# Patient Record
Sex: FEMALE | Race: WHITE | NOT HISPANIC OR LATINO | Employment: OTHER | URBAN - NONMETROPOLITAN AREA
[De-identification: names, ages, dates, MRNs, and addresses within clinical notes are randomized per-mention and may not be internally consistent; named-entity substitution may affect disease eponyms.]

---

## 2017-01-03 DIAGNOSIS — M25.519 ACUTE SHOULDER PAIN, UNSPECIFIED LATERALITY: Primary | ICD-10-CM

## 2017-01-04 ENCOUNTER — HOSPITAL ENCOUNTER (OUTPATIENT)
Dept: MRI IMAGING | Facility: HOSPITAL | Age: 47
Discharge: HOME | End: 2017-01-04
Admitting: NURSE PRACTITIONER

## 2017-01-04 ENCOUNTER — HOSPITAL ENCOUNTER (OUTPATIENT)
Dept: GENERAL RADIOLOGY | Facility: HOSPITAL | Age: 47
Discharge: HOME | End: 2017-01-04

## 2017-01-04 DIAGNOSIS — M15.9 PRIMARY OSTEOARTHRITIS INVOLVING MULTIPLE JOINTS: ICD-10-CM

## 2017-01-04 PROCEDURE — 73030 X-RAY EXAM OF SHOULDER: CPT

## 2017-01-04 PROCEDURE — 72141 MRI NECK SPINE W/O DYE: CPT

## 2017-01-04 PROCEDURE — 73030 X-RAY EXAM OF SHOULDER: CPT | Performed by: RADIOLOGY

## 2017-01-04 PROCEDURE — 72141 MRI NECK SPINE W/O DYE: CPT | Performed by: RADIOLOGY

## 2017-01-17 ENCOUNTER — OFFICE VISIT (OUTPATIENT)
Dept: FAMILY MEDICINE CLINIC | Facility: CLINIC | Age: 47
End: 2017-01-17

## 2017-01-17 VITALS
BODY MASS INDEX: 42.34 KG/M2 | TEMPERATURE: 98.7 F | OXYGEN SATURATION: 93 % | SYSTOLIC BLOOD PRESSURE: 120 MMHG | HEART RATE: 85 BPM | DIASTOLIC BLOOD PRESSURE: 70 MMHG | WEIGHT: 248 LBS | HEIGHT: 64 IN

## 2017-01-17 DIAGNOSIS — K57.20 DIVERTICULITIS OF LARGE INTESTINE WITH PERFORATION WITHOUT BLEEDING: ICD-10-CM

## 2017-01-17 DIAGNOSIS — Z72.0 TOBACCO USE: Primary | ICD-10-CM

## 2017-01-17 DIAGNOSIS — E11.51 CONTROLLED TYPE 2 DM WITH PERIPHERAL CIRCULATORY DISORDER (HCC): ICD-10-CM

## 2017-01-17 DIAGNOSIS — M15.9 PRIMARY OSTEOARTHRITIS INVOLVING MULTIPLE JOINTS: ICD-10-CM

## 2017-01-17 DIAGNOSIS — M25.512 LEFT SHOULDER PAIN, UNSPECIFIED CHRONICITY: ICD-10-CM

## 2017-01-17 PROBLEM — I10 HYPERTENSION: Status: ACTIVE | Noted: 2017-01-17

## 2017-01-17 LAB
ALBUMIN SERPL-MCNC: 4.4 G/DL (ref 3.5–5)
ALBUMIN UR-MCNC: 5.2 MG/L
ALBUMIN/GLOB SERPL: 1.5 G/DL (ref 1.5–2.5)
ALP SERPL-CCNC: 64 U/L (ref 46–116)
ALT SERPL W P-5'-P-CCNC: 15 U/L (ref 10–36)
AMYLASE SERPL-CCNC: 33 U/L (ref 28–100)
ANION GAP SERPL CALCULATED.3IONS-SCNC: 0.3 MMOL/L (ref 3.6–11.2)
AST SERPL-CCNC: 12 U/L (ref 10–30)
BASOPHILS # BLD AUTO: 0.05 10*3/MM3 (ref 0–0.3)
BASOPHILS NFR BLD AUTO: 0.5 % (ref 0–2)
BILIRUB SERPL-MCNC: 0.3 MG/DL (ref 0.2–1.8)
BUN BLD-MCNC: 7 MG/DL (ref 7–21)
BUN/CREAT SERPL: 11.1 (ref 7–25)
CALCIUM SPEC-SCNC: 9.9 MG/DL (ref 7.7–10)
CHLORIDE SERPL-SCNC: 110 MMOL/L (ref 99–112)
CO2 SERPL-SCNC: 32.7 MMOL/L (ref 24.3–31.9)
CREAT BLD-MCNC: 0.63 MG/DL (ref 0.43–1.29)
DEPRECATED RDW RBC AUTO: 47.5 FL (ref 37–54)
EOSINOPHIL # BLD AUTO: 0.2 10*3/MM3 (ref 0–0.7)
EOSINOPHIL NFR BLD AUTO: 2.1 % (ref 0–5)
ERYTHROCYTE [DISTWIDTH] IN BLOOD BY AUTOMATED COUNT: 14.9 % (ref 11.5–14.5)
GFR SERPL CREATININE-BSD FRML MDRD: 101 ML/MIN/1.73
GLOBULIN UR ELPH-MCNC: 2.9 GM/DL
GLUCOSE BLD-MCNC: 107 MG/DL (ref 70–110)
HCT VFR BLD AUTO: 42.3 % (ref 37–47)
HGB BLD-MCNC: 13.3 G/DL (ref 12–16)
IMM GRANULOCYTES # BLD: 0.01 10*3/MM3 (ref 0–0.03)
IMM GRANULOCYTES NFR BLD: 0.1 % (ref 0–0.5)
LIPASE SERPL-CCNC: 32 U/L (ref 13–60)
LYMPHOCYTES # BLD AUTO: 2.62 10*3/MM3 (ref 1–3)
LYMPHOCYTES NFR BLD AUTO: 27.7 % (ref 21–51)
MCH RBC QN AUTO: 27.4 PG (ref 27–33)
MCHC RBC AUTO-ENTMCNC: 31.4 G/DL (ref 33–37)
MCV RBC AUTO: 87 FL (ref 80–94)
MONOCYTES # BLD AUTO: 0.8 10*3/MM3 (ref 0.1–0.9)
MONOCYTES NFR BLD AUTO: 8.5 % (ref 0–10)
NEUTROPHILS # BLD AUTO: 5.77 10*3/MM3 (ref 1.4–6.5)
NEUTROPHILS NFR BLD AUTO: 61.1 % (ref 30–70)
OSMOLALITY SERPL CALC.SUM OF ELEC: 283.4 MOSM/KG (ref 273–305)
PLATELET # BLD AUTO: 246 10*3/MM3 (ref 130–400)
PMV BLD AUTO: 10.6 FL (ref 6–10)
POTASSIUM BLD-SCNC: 3.8 MMOL/L (ref 3.5–5.3)
PROT SERPL-MCNC: 7.3 G/DL (ref 6–8)
RBC # BLD AUTO: 4.86 10*6/MM3 (ref 4.2–5.4)
SODIUM BLD-SCNC: 143 MMOL/L (ref 135–153)
WBC NRBC COR # BLD: 9.45 10*3/MM3 (ref 4.5–12.5)

## 2017-01-17 PROCEDURE — 36415 COLL VENOUS BLD VENIPUNCTURE: CPT | Performed by: NURSE PRACTITIONER

## 2017-01-17 PROCEDURE — 83690 ASSAY OF LIPASE: CPT | Performed by: NURSE PRACTITIONER

## 2017-01-17 PROCEDURE — 82043 UR ALBUMIN QUANTITATIVE: CPT | Performed by: NURSE PRACTITIONER

## 2017-01-17 PROCEDURE — 85025 COMPLETE CBC W/AUTO DIFF WBC: CPT | Performed by: NURSE PRACTITIONER

## 2017-01-17 PROCEDURE — 84681 ASSAY OF C-PEPTIDE: CPT | Performed by: NURSE PRACTITIONER

## 2017-01-17 PROCEDURE — 80053 COMPREHEN METABOLIC PANEL: CPT | Performed by: NURSE PRACTITIONER

## 2017-01-17 PROCEDURE — 83036 HEMOGLOBIN GLYCOSYLATED A1C: CPT | Performed by: NURSE PRACTITIONER

## 2017-01-17 PROCEDURE — 82150 ASSAY OF AMYLASE: CPT | Performed by: NURSE PRACTITIONER

## 2017-01-17 PROCEDURE — 99214 OFFICE O/P EST MOD 30 MIN: CPT | Performed by: NURSE PRACTITIONER

## 2017-01-17 RX ORDER — HYDROCODONE BITARTRATE AND ACETAMINOPHEN 10; 325 MG/1; MG/1
1 TABLET ORAL EVERY 6 HOURS PRN
Qty: 90 TABLET | Refills: 0 | Status: SHIPPED | OUTPATIENT
Start: 2017-01-17 | End: 2017-02-14 | Stop reason: SDUPTHER

## 2017-01-17 NOTE — PROGRESS NOTES
Subjective   Mirian Fischer is a 46 y.o. female.     History of Present Illness     Joint pain  Rates her pain as 4 on psr today. At its worse she rates it at 8 on psr. Patient states that pain is reduced by at least one half with current Medication/Treatment. Pain does interfere with ability to perform daily activities as well as enjoyment of life. Patient reports quality of life and mobility has improved as a result of current treatment. Patient describes pain as aching, burning, sharp, stabbing and throbbing. Pain is made worse from going up and down stairs, turning, bending, standing, walking and sitting . Pain is eased by medication, rest, Hydrocodone and Neurontin. [facility] urine drug screen will be obtained as indicated. Pt has failed physical therapy in the past. She has been seen by neurology and ortho. Radiology is on file.      Left shoulder pain   Mirian is reporting  pain and numbness for the past few months in her left arm and hand. The pain is described as sharp in her shoulder , worse with movement, radiates down her arm and into her wrist and hand with numbness in her two middle fingers at times. Xray is on file with no findings. The pain is reported as getting worse.       Diverticulitis  Did not keep her follow up apt with Dr. Pruitt to schedule surgery.     Type 2 diabetes  Pt states that her glucose goes high after she eats but gets low at times making her feel weak. She admits to eating a diet high in carbs and fats. Has checked on lap band and is waiting on an appointment. Is taking metformin. Checks her glucose less often than twice daily.         The following portions of the patient's history were reviewed and updated as appropriate: allergies, current medications, past family history, past medical history, past social history, past surgical history and problem list.    Review of Systems   Constitutional: Negative for activity change, appetite change, chills, fatigue and fever.   HENT:  Negative for ear pain, facial swelling, hearing loss, sinus pressure, sore throat, trouble swallowing and voice change.    Eyes: Negative for pain, discharge and visual disturbance.   Respiratory: Negative for apnea, cough, chest tightness, shortness of breath and wheezing.    Cardiovascular: Negative for chest pain, palpitations and leg swelling.   Gastrointestinal: Positive for abdominal pain (left lower quad at times, not current ). Negative for blood in stool, constipation, diarrhea, nausea and vomiting.   Endocrine: Negative.    Genitourinary: Negative for dysuria.   Musculoskeletal: Positive for arthralgias, back pain and myalgias. Negative for neck stiffness.   Skin: Negative for color change.   Allergic/Immunologic: Positive for environmental allergies. Negative for food allergies and immunocompromised state.   Neurological: Positive for numbness (left hand at times ). Negative for headaches.   Hematological: Negative for adenopathy. Does not bruise/bleed easily.   Psychiatric/Behavioral: Negative for confusion, self-injury and suicidal ideas. The patient is not nervous/anxious.    All other systems reviewed and are negative.      Objective   Physical Exam   Constitutional: She is oriented to person, place, and time. Vital signs are normal. She appears well-developed and well-nourished. No distress.     Obese adult female   HENT:   Head: Normocephalic and atraumatic.   Right Ear: External ear normal.   Left Ear: External ear normal.   Nose: Nose normal.   Mouth/Throat: Oropharynx is clear and moist. No oropharyngeal exudate.   Eyes: Conjunctivae, EOM and lids are normal. Pupils are equal, round, and reactive to light.   Neck: Trachea normal and normal range of motion. Neck supple. No thyromegaly present.   Cardiovascular: Normal rate, regular rhythm, normal heart sounds and intact distal pulses.    No murmur heard.  Pulmonary/Chest: Effort normal and breath sounds normal. No respiratory distress. She has no  wheezes. She has no rales. She exhibits no tenderness.   Abdominal: Soft. Bowel sounds are normal. She exhibits no distension and no mass. There is no tenderness. There is no rebound and no guarding.   Musculoskeletal:        Left shoulder: She exhibits decreased range of motion, tenderness, crepitus, pain and decreased strength. She exhibits normal pulse.        Left wrist: She exhibits tenderness. She exhibits no swelling and no effusion.        Lumbar back: She exhibits decreased range of motion, tenderness, pain and spasm.        Left hand: She exhibits decreased capillary refill. Decreased sensation noted. Decreased strength noted.   Decreased lumbar curvature, there is pain with forward flexion. DTR's +2. No edema.     Patient has several missing tips of fingers ( right hand second and third digits).      Lymphadenopathy:     She has no cervical adenopathy.   Neurological: She is alert and oriented to person, place, and time. She has normal reflexes.   Weak  in the left hand as compared to the right.  Pulses are good.    Skin: Skin is warm and dry. No rash noted. No erythema. No pallor.   Psychiatric: She has a normal mood and affect. Her speech is normal and behavior is normal. Judgment and thought content normal. Her affect is not inappropriate. Cognition and memory are normal.   Denies thoughts of hurting self or others.   Nursing note and vitals reviewed.      Assessment/Plan   Mirian was seen today for osteoarthritis, hypertension, anxiety and back pain.    Diagnoses and all orders for this visit:    Tobacco use  - advised to stop smoking     Primary osteoarthritis involving multiple joints  -     HYDROcodone-acetaminophen (NORCO)  MG per tablet; Take 1 tablet by mouth Every 6 (Six) Hours As Needed for moderate pain (4-6).  -     Cancel: Urine Drug Screen  -     Comprehensive Metabolic Panel  -     CBC & Differential  -     Urine Drug Screen  -     CBC Auto Differential    Diverticulitis of  large intestine with perforation without bleeding  -     Comprehensive Metabolic Panel  -     CBC & Differential  -     CBC Auto Differential    Controlled type 2 DM with peripheral circulatory disorder  -     Amylase  -     Lipase  -     Hemoglobin A1C With EMG  -     C-Peptide  -     MicroAlbumin, Urine, Random; Future  -     MicroAlbumin, Urine, Random    Left shoulder pain, unspecified chronicity  -     MRI Shoulder Left With Contrast; Future  No physical therapy at this time, possible tear.         Recommend MRI of left shoulder due to pain. She is unable to tolerate any physical at this time due to pain and limited use.   Have encouraged Mirian to stop smoking.     I have discussed diagnosis in detail today allowing time for questions and answers. Pt is aware of reasons to seek urgent or emergent medical care as well as reasons to return to the clinic for evaluation. Possible side effects, interactions and progression of symptoms discussed as well. Pt / family states understanding.   Weight loss and body mechanics discussed.  Labs today.   Encouraged pt to make and keep her follow up appointment with Dr. Pruitt to discuss possible surgery for diverticulitis.   Marques Reviewed as consistent. Uds on file as consistent. Repeat uds today then randomly.   Follow up in one month, sooner if neeed.

## 2017-01-17 NOTE — MR AVS SNAPSHOT
Mirian VARSHA DiasClarion   1/17/2017 10:40 AM   Office Visit    Dept Phone:  [phone]   Encounter #:  [id number]    Provider:  CHARMAINE Dejesus   Department:  [facility]                Your Full Care Plan              Where to Get Your Medications      You can get these medications from any pharmacy     Bring a paper prescription for each of these medications     HYDROcodone-acetaminophen  MG per tablet            Your Updated Medication List          This list is accurate as of: 1/17/17 11:20 AM.  Always use your most recent med list.                atenolol 25 MG tablet   Commonly known as:  TENORMIN   TAKE 1 TABLET BY MOUTH TWICE DAILY       diclofenac 75 MG EC tablet   Commonly known as:  VOLTAREN   Take 1 tablet by mouth 2 (Two) Times a Day.       DULoxetine 60 MG capsule   Commonly known as:  CYMBALTA   Take 1 capsule by mouth daily.       esomeprazole 40 MG capsule   Commonly known as:  NEXIUM   Take 1 capsule by mouth Every Morning Before Breakfast.       fluticasone 50 MCG/ACT nasal spray   Commonly known as:  FLONASE   1 spray into each nostril Daily. Administer 1 sprays in each nostril for each dose.       gabapentin 300 MG capsule   Commonly known as:  NEURONTIN   Take 1 capsule by mouth 3 (Three) Times a Day.       hydrochlorothiazide 25 MG tablet   Commonly known as:  HYDRODIURIL   Take 1 tablet by mouth Daily.       HYDROcodone-acetaminophen  MG per tablet   Commonly known as:  NORCO   Take 1 tablet by mouth Every 6 (Six) Hours As Needed for moderate pain (4-6).       levocetirizine 5 MG tablet   Commonly known as:  XYZAL   Take 1 tablet by mouth Every Evening.       linaclotide 290 MCG capsule capsule   Commonly known as:  LINZESS   Take 290 mcg by mouth Every Morning Before Breakfast.       lisinopril 10 MG tablet   Commonly known as:  PRINIVIL,ZESTRIL   Take 1 tablet by mouth Daily.       medroxyPROGESTERone 150  MG/ML injection   Commonly known as:  DEPO-PROVERA   Inject 1 mL into the shoulder, thigh, or buttocks every 3 (three) months.       metFORMIN  MG 24 hr tablet   Commonly known as:  GLUCOPHAGE-XR   Take 1 tablet by mouth Daily.       methocarbamol 750 MG tablet   Commonly known as:  ROBAXIN   Take 1 tablet by mouth 2 (Two) Times a Day.       nizatidine 150 MG capsule   Commonly known as:  AXID   Take 1 capsule by mouth 2 (Two) Times a Day.       potassium chloride 10 MEQ CR tablet   Commonly known as:  K-DUR   Take 1 tablet by mouth 2 (Two) Times a Day.       simvastatin 20 MG tablet   Commonly known as:  ZOCOR   Take 1 tablet by mouth Daily.       varenicline 1 MG tablet   Commonly known as:  CHANTIX   Take 1 tablet by mouth 2 (Two) Times a Day.       vitamin D 13431 UNITS capsule capsule   Commonly known as:  ERGOCALCIFEROL   Take 1 capsule by mouth Every 7 (Seven) Days. wednesday               We Performed the Following     Amylase     C-Peptide     CBC & Differential     CBC Auto Differential     Comprehensive Metabolic Panel     Hemoglobin A1C With EMG     Lipase     MicroAlbumin, Urine, Random     Urine Drug Screen       You Were Diagnosed With        Codes Comments    Tobacco use    -  Primary ICD-10-CM: Z72.0  ICD-9-CM: 305.1     Primary osteoarthritis involving multiple joints     ICD-10-CM: M15.0  ICD-9-CM: 715.09     Diverticulitis of large intestine with perforation without bleeding     ICD-10-CM: K57.20  ICD-9-CM: 562.11, 569.83     Controlled type 2 DM with peripheral circulatory disorder     ICD-10-CM: E11.51  ICD-9-CM: 250.70, 443.81     Left shoulder pain, unspecified chronicity     ICD-10-CM: M25.512  ICD-9-CM: 719.41       Instructions     None    Patient Instructions History      Upcoming Appointments     Visit Type Date Time Department    OFFICE VISIT 1/17/2017 10:40 AM [facility] [place]    OFFICE VISIT 2/14/2017  3:00 PM [facility] [place]      MyChart Signup     [facility] MyChart  "allows you to send messages to your doctor, view your test results, renew your prescriptions, schedule appointments, and more. To sign up, go to [employer] and click on the Sign Up Now link in the New User? box. Enter your [facility] Activation Code exactly as it appears below along with the last four digits of your Social Security Number and your Date of Birth () to complete the sign-up process. If you do not sign up before the expiration date, you must request a new code.    [facility] Activation Code: [email or web address]  Expires: 2017  5:40 AM    If you have questions, you can email [email or web address] or call [phone] to talk to our [facility] staff. Remember, [facility] [facility] is NOT to be used for urgent needs. For medical emergencies, dial 911.               Other Info from Your Visit           Your Appointments     2017  3:00 PM EST   Office Visit with CHARMAINE Dejesus   [facility] (--)    [address] [place] [id number] [phone]           Please arrive 10 minutes early, bring a complete list of all medications and bring any previous records or diagnostic testing results.              Allergies     No Known Allergies      Reason for Visit     Osteoarthritis     Hypertension     Anxiety     Back Pain           Vital Signs     Blood Pressure Pulse Temperature Height Weight Oxygen Saturation    120/70 (BP Location: Left arm, Patient Position: Standing, Cuff Size: Adult) 85 98.7 °F (37.1 °C) (Oral) 64\" (162.6 cm) 248 lb (112 kg) 93%    Body Mass Index Smoking Status                42.57 kg/m2 Current Every Day Smoker          Problems and Diagnoses Noted     Controlled type 2 DM with peripheral circulatory disorder    Diverticulitis of large intestine with perforation without bleeding    High blood pressure    Osteoarthritis (arthritis due to wear and tear of joints)    Tobacco use    Pain in left shoulder          Results     "

## 2017-01-18 LAB
C PEPTIDE SERPL-MCNC: 4.5 NG/ML (ref 1.1–4.4)
EST. AVERAGE GLUCOSE BLD GHB EST-MCNC: 126 MG/DL
HBA1C MFR BLD: 6 % (ref 4.8–5.6)

## 2017-02-01 ENCOUNTER — HOSPITAL ENCOUNTER (OUTPATIENT)
Dept: MRI IMAGING | Facility: HOSPITAL | Age: 47
Discharge: HOME | End: 2017-02-01
Admitting: NURSE PRACTITIONER

## 2017-02-01 DIAGNOSIS — M25.512 LEFT SHOULDER PAIN, UNSPECIFIED CHRONICITY: ICD-10-CM

## 2017-02-01 PROCEDURE — 73221 MRI JOINT UPR EXTREM W/O DYE: CPT

## 2017-02-01 PROCEDURE — 73221 MRI JOINT UPR EXTREM W/O DYE: CPT | Performed by: RADIOLOGY

## 2017-02-14 ENCOUNTER — OFFICE VISIT (OUTPATIENT)
Dept: FAMILY MEDICINE CLINIC | Facility: CLINIC | Age: 47
End: 2017-02-14

## 2017-02-14 VITALS
DIASTOLIC BLOOD PRESSURE: 70 MMHG | BODY MASS INDEX: 41.83 KG/M2 | OXYGEN SATURATION: 94 % | HEART RATE: 84 BPM | SYSTOLIC BLOOD PRESSURE: 115 MMHG | WEIGHT: 245 LBS | HEIGHT: 64 IN | TEMPERATURE: 98.5 F

## 2017-02-14 DIAGNOSIS — Z79.899 HIGH RISK MEDICATION USE: ICD-10-CM

## 2017-02-14 DIAGNOSIS — M15.9 PRIMARY OSTEOARTHRITIS INVOLVING MULTIPLE JOINTS: ICD-10-CM

## 2017-02-14 DIAGNOSIS — S43.432D LABRAL TEAR OF SHOULDER, LEFT, SUBSEQUENT ENCOUNTER: ICD-10-CM

## 2017-02-14 DIAGNOSIS — Z72.0 TOBACCO USE: ICD-10-CM

## 2017-02-14 DIAGNOSIS — M51.36 BULGE OF LUMBAR DISC WITHOUT MYELOPATHY: Primary | ICD-10-CM

## 2017-02-14 PROCEDURE — 99214 OFFICE O/P EST MOD 30 MIN: CPT | Performed by: NURSE PRACTITIONER

## 2017-02-14 PROCEDURE — 99406 BEHAV CHNG SMOKING 3-10 MIN: CPT | Performed by: NURSE PRACTITIONER

## 2017-02-14 RX ORDER — HYDROCODONE BITARTRATE AND ACETAMINOPHEN 10; 325 MG/1; MG/1
1 TABLET ORAL EVERY 6 HOURS PRN
Qty: 120 TABLET | Refills: 0
Start: 2017-02-14 | End: 2017-03-16 | Stop reason: SDUPTHER

## 2017-02-14 RX ORDER — VARENICLINE TARTRATE 1 MG/1
1 TABLET, FILM COATED ORAL 2 TIMES DAILY
Qty: 60 TABLET | Refills: 5 | Status: SHIPPED | OUTPATIENT
Start: 2017-02-14 | End: 2017-07-12

## 2017-02-14 NOTE — PROGRESS NOTES
Subjective   Mirian Fischer is a 47 y.o. female.     History of Present Illness     Mirian is complaining of increased pain today and requesting an increase in her Norco frequency.     MRI left shoulder with continues shoulder pain with movement.   Impression:  Partial distal supraspinatus bursal and articular surface tears.  Acromioclavicular joint space degeneration. Probable chronic  degeneration and tear of the superior labrum.      Joint pain  Rates her pain as 7 on psr today. At its worse she rates it at 8 on psr. Patient states that pain is reduced by at least one half with current Medication/Treatment. Pain does interfere with ability to perform daily activities as well as enjoyment of life. Patient reports quality of life and mobility has improved as a result of current treatment. Patient describes pain as aching, burning, sharp, stabbing and throbbing. Pain is made worse from going up and down stairs, turning, bending, standing, walking and sitting . Pain is eased by medication, rest, Hydrocodone and Neurontin. [facility] urine drug screen will be obtained as indicated.    Tobacco abuse  Was doing well on Chantix. Pharmacy did not give her a refill.     GERD  Failed treatment with Prilosec, Tagement, Pepcid, Zantac. Heart burn is better controlled with Nexium than with any other treatment. Will request PA as insurance wants to decline.      The following portions of the patient's history were reviewed and updated as appropriate: allergies, current medications, past family history, past medical history, past social history, past surgical history and problem list.    Review of Systems   Constitutional: Positive for fatigue. Negative for activity change, chills, fever and unexpected weight change.   Respiratory: Negative for cough, chest tightness and shortness of breath.    Cardiovascular: Negative for chest pain, palpitations and leg swelling.   Gastrointestinal: Positive for abdominal pain and constipation  (stable with Linzess). Negative for blood in stool, diarrhea, nausea and vomiting.   Endocrine: Negative.    Genitourinary: Negative for dysuria and flank pain.   Musculoskeletal: Positive for arthralgias, back pain, gait problem and myalgias. Negative for neck stiffness.   Allergic/Immunologic: Negative.    Neurological: Negative for seizures and facial asymmetry.   Hematological: Negative for adenopathy. Does not bruise/bleed easily.   Psychiatric/Behavioral: Negative for self-injury and suicidal ideas. The patient is not nervous/anxious.    All other systems reviewed and are negative.      Objective   Physical Exam   Constitutional: She is oriented to person, place, and time. Vital signs are normal. She appears well-developed and well-nourished. No distress.     Obese adult female   HENT:   Head: Normocephalic and atraumatic.   Right Ear: External ear normal.   Left Ear: External ear normal.   Nose: Nose normal.   Mouth/Throat: Oropharynx is clear and moist. No oropharyngeal exudate.   Eyes: Conjunctivae, EOM and lids are normal. Pupils are equal, round, and reactive to light.   Neck: Trachea normal and normal range of motion. Neck supple. No thyromegaly present.   Cardiovascular: Normal rate, regular rhythm, normal heart sounds and intact distal pulses.    No murmur heard.  Pulmonary/Chest: Effort normal and breath sounds normal. No respiratory distress. She has no wheezes. She has no rales. She exhibits no tenderness.   Abdominal: Soft. Bowel sounds are normal. She exhibits no distension and no mass. There is no tenderness. There is no rebound and no guarding.   Musculoskeletal:        Left shoulder: She exhibits decreased range of motion, tenderness, crepitus, pain and decreased strength. She exhibits normal pulse.        Left wrist: She exhibits tenderness. She exhibits no swelling and no effusion.        Lumbar back: She exhibits decreased range of motion, tenderness, pain and spasm.        Left hand: She  exhibits decreased capillary refill. Decreased sensation noted. Decreased strength noted.   Decreased lumbar curvature, there is pain with forward flexion. DTR's +2. No edema.     Patient has several missing tips of fingers ( right hand second and third digits).      Lymphadenopathy:     She has no cervical adenopathy.   Neurological: She is alert and oriented to person, place, and time. She has normal reflexes.   Weak  in the left hand as compared to the right.  Pulses are good.    Skin: Skin is warm and dry. No rash noted. No erythema. No pallor.   Psychiatric: She has a normal mood and affect. Her speech is normal and behavior is normal. Judgment and thought content normal. Her affect is not inappropriate. Cognition and memory are normal.   Denies thoughts of hurting self or others.   Nursing note and vitals reviewed.      Assessment/Plan   Diagnoses and all orders for this visit:    Bulge of lumbar disc without myelopathy    Primary osteoarthritis involving multiple joints  -     HYDROcodone-acetaminophen (NORCO)  MG per tablet; Take 1 tablet by mouth Every 6 (Six) Hours As Needed for moderate pain (4-6).    Labral tear of shoulder, left, subsequent encounter  -     Ambulatory Referral to Orthopedic Surgery    High risk medication use  -     Urine Drug Screen    Tobacco use    Other orders  -     varenicline (CHANTIX) 1 MG tablet; Take 1 tablet by mouth 2 (Two) Times a Day.    GERD  Will request PA for Nexium.     Smoke cessation education provided > 3 minutes. Refill chantix and encouraged smoke cessation. Time spent on smoke cessation 4 minutes.   Will increase her Norco to # 120 monthly for better pain control while awaiting orthopedic consult.   Body mechanics reviewed. Discussed need to prevent constipation with increase in [place].   SAAD and sonya are on file.  Have requested staff call [facility] to check on status of weight loss consult.   Emotional support and active listening provided.   I  have discussed diagnosis in detail today allowing time for questions and answers. Pt is aware of reasons to seek urgent or emergent medical care as well as reasons to return to the clinic for evaluation. Possible side effects, interactions and progression of symptoms discussed as well. Pt / family states understanding.   Follow up in one month, sooner if needed.

## 2017-02-15 ENCOUNTER — TELEPHONE (OUTPATIENT)
Dept: FAMILY MEDICINE CLINIC | Facility: CLINIC | Age: 47
End: 2017-02-15

## 2017-02-16 ENCOUNTER — TELEPHONE (OUTPATIENT)
Dept: FAMILY MEDICINE CLINIC | Facility: CLINIC | Age: 47
End: 2017-02-16

## 2017-02-16 NOTE — TELEPHONE ENCOUNTER
----- Message from CHARMAINE Dejesus sent at 2/14/2017  3:41 PM EST -----  Call [facility] and see what they are waiting on for her weight loss surgery. We may need to refax the consult papers.     mb

## 2017-02-17 RX ORDER — DULOXETIN HYDROCHLORIDE 60 MG/1
CAPSULE, DELAYED RELEASE ORAL
Qty: 30 CAPSULE | Refills: 2 | Status: CANCELLED | OUTPATIENT
Start: 2017-02-17

## 2017-02-17 RX ORDER — GABAPENTIN 300 MG/1
CAPSULE ORAL
Qty: 90 CAPSULE | Refills: 2 | Status: CANCELLED | OUTPATIENT
Start: 2017-02-17

## 2017-02-20 RX ORDER — GABAPENTIN 300 MG/1
300 CAPSULE ORAL 3 TIMES DAILY
Qty: 90 CAPSULE | Refills: 2 | Status: SHIPPED | OUTPATIENT
Start: 2017-02-20 | End: 2017-05-22 | Stop reason: SDUPTHER

## 2017-02-20 RX ORDER — DULOXETIN HYDROCHLORIDE 60 MG/1
60 CAPSULE, DELAYED RELEASE ORAL DAILY
Qty: 30 CAPSULE | Refills: 3 | Status: SHIPPED | OUTPATIENT
Start: 2017-02-20 | End: 2017-06-21 | Stop reason: SDUPTHER

## 2017-02-22 DIAGNOSIS — E55.9 VITAMIN D DEFICIENCY DISEASE: ICD-10-CM

## 2017-02-22 DIAGNOSIS — Z13.9 SCREENING: Primary | ICD-10-CM

## 2017-02-27 ENCOUNTER — LAB (OUTPATIENT)
Dept: FAMILY MEDICINE CLINIC | Facility: CLINIC | Age: 47
End: 2017-02-27

## 2017-02-27 DIAGNOSIS — E55.9 VITAMIN D DEFICIENCY DISEASE: ICD-10-CM

## 2017-02-27 DIAGNOSIS — Z13.9 SCREENING: ICD-10-CM

## 2017-02-27 LAB
25(OH)D3 SERPL-MCNC: 41 NG/ML
ALBUMIN SERPL-MCNC: 4.2 G/DL (ref 3.5–5)
ALBUMIN UR-MCNC: 9.5 MG/L
ALBUMIN/GLOB SERPL: 1.4 G/DL (ref 1.5–2.5)
ALP SERPL-CCNC: 79 U/L (ref 35–104)
ALT SERPL W P-5'-P-CCNC: 20 U/L (ref 10–36)
ANION GAP SERPL CALCULATED.3IONS-SCNC: 5.6 MMOL/L (ref 3.6–11.2)
AST SERPL-CCNC: 19 U/L (ref 10–30)
BASOPHILS # BLD AUTO: 0.07 10*3/MM3 (ref 0–0.3)
BASOPHILS NFR BLD AUTO: 0.6 % (ref 0–2)
BILIRUB SERPL-MCNC: 0.2 MG/DL (ref 0.2–1.8)
BUN BLD-MCNC: 10 MG/DL (ref 7–21)
BUN/CREAT SERPL: 16.1 (ref 7–25)
CALCIUM SPEC-SCNC: 9.6 MG/DL (ref 7.7–10)
CHLORIDE SERPL-SCNC: 104 MMOL/L (ref 99–112)
CHOLEST SERPL-MCNC: 147 MG/DL (ref 0–200)
CO2 SERPL-SCNC: 26.4 MMOL/L (ref 24.3–31.9)
CREAT BLD-MCNC: 0.62 MG/DL (ref 0.43–1.29)
DEPRECATED RDW RBC AUTO: 44.7 FL (ref 37–54)
EOSINOPHIL # BLD AUTO: 0.36 10*3/MM3 (ref 0–0.7)
EOSINOPHIL NFR BLD AUTO: 3.1 % (ref 0–5)
ERYTHROCYTE [DISTWIDTH] IN BLOOD BY AUTOMATED COUNT: 14.6 % (ref 11.5–14.5)
GFR SERPL CREATININE-BSD FRML MDRD: 103 ML/MIN/1.73
GLOBULIN UR ELPH-MCNC: 3 GM/DL
GLUCOSE BLD-MCNC: 127 MG/DL (ref 70–110)
HBA1C MFR BLD: 5.6 % (ref 4.5–5.7)
HCT VFR BLD AUTO: 47 % (ref 37–47)
HDLC SERPL-MCNC: 38 MG/DL (ref 60–100)
HGB BLD-MCNC: 15 G/DL (ref 12–16)
IMM GRANULOCYTES # BLD: 0.03 10*3/MM3 (ref 0–0.03)
IMM GRANULOCYTES NFR BLD: 0.3 % (ref 0–0.5)
LDLC SERPL CALC-MCNC: 35 MG/DL (ref 0–100)
LDLC/HDLC SERPL: 0.93 {RATIO}
LYMPHOCYTES # BLD AUTO: 2.67 10*3/MM3 (ref 1–3)
LYMPHOCYTES NFR BLD AUTO: 23.1 % (ref 21–51)
MCH RBC QN AUTO: 27.2 PG (ref 27–33)
MCHC RBC AUTO-ENTMCNC: 31.9 G/DL (ref 33–37)
MCV RBC AUTO: 85.1 FL (ref 80–94)
MONOCYTES # BLD AUTO: 0.98 10*3/MM3 (ref 0.1–0.9)
MONOCYTES NFR BLD AUTO: 8.5 % (ref 0–10)
NEUTROPHILS # BLD AUTO: 7.43 10*3/MM3 (ref 1.4–6.5)
NEUTROPHILS NFR BLD AUTO: 64.4 % (ref 30–70)
OSMOLALITY SERPL CALC.SUM OF ELEC: 272.6 MOSM/KG (ref 273–305)
PLATELET # BLD AUTO: 291 10*3/MM3 (ref 130–400)
PMV BLD AUTO: 10.6 FL (ref 6–10)
POTASSIUM BLD-SCNC: 3.7 MMOL/L (ref 3.5–5.3)
PROT SERPL-MCNC: 7.2 G/DL (ref 6–8)
RBC # BLD AUTO: 5.52 10*6/MM3 (ref 4.2–5.4)
SODIUM BLD-SCNC: 136 MMOL/L (ref 135–153)
TRIGL SERPL-MCNC: 369 MG/DL (ref 0–150)
TSH SERPL DL<=0.05 MIU/L-ACNC: 1.69 MIU/ML (ref 0.55–4.78)
VIT B12 BLD-MCNC: 355 PG/ML (ref 211–911)
VLDLC SERPL-MCNC: 73.8 MG/DL
WBC NRBC COR # BLD: 11.54 10*3/MM3 (ref 4.5–12.5)

## 2017-02-27 PROCEDURE — 83036 HEMOGLOBIN GLYCOSYLATED A1C: CPT | Performed by: NURSE PRACTITIONER

## 2017-02-27 PROCEDURE — 82043 UR ALBUMIN QUANTITATIVE: CPT | Performed by: NURSE PRACTITIONER

## 2017-02-27 PROCEDURE — 84443 ASSAY THYROID STIM HORMONE: CPT | Performed by: NURSE PRACTITIONER

## 2017-02-27 PROCEDURE — 82306 VITAMIN D 25 HYDROXY: CPT | Performed by: NURSE PRACTITIONER

## 2017-02-27 PROCEDURE — 80053 COMPREHEN METABOLIC PANEL: CPT | Performed by: NURSE PRACTITIONER

## 2017-02-27 PROCEDURE — 36415 COLL VENOUS BLD VENIPUNCTURE: CPT

## 2017-02-27 PROCEDURE — 82607 VITAMIN B-12: CPT | Performed by: NURSE PRACTITIONER

## 2017-02-27 PROCEDURE — 85025 COMPLETE CBC W/AUTO DIFF WBC: CPT | Performed by: NURSE PRACTITIONER

## 2017-02-27 PROCEDURE — 80061 LIPID PANEL: CPT | Performed by: NURSE PRACTITIONER

## 2017-02-28 ENCOUNTER — OFFICE VISIT (OUTPATIENT)
Dept: ORTHOPEDIC SURGERY | Facility: CLINIC | Age: 47
End: 2017-02-28

## 2017-02-28 ENCOUNTER — DOCUMENTATION (OUTPATIENT)
Dept: FAMILY MEDICINE CLINIC | Facility: CLINIC | Age: 47
End: 2017-02-28

## 2017-02-28 VITALS
SYSTOLIC BLOOD PRESSURE: 116 MMHG | HEART RATE: 71 BPM | BODY MASS INDEX: 41.83 KG/M2 | DIASTOLIC BLOOD PRESSURE: 72 MMHG | WEIGHT: 245 LBS | HEIGHT: 64 IN

## 2017-02-28 DIAGNOSIS — G56.22 CUBITAL TUNNEL SYNDROME, LEFT: ICD-10-CM

## 2017-02-28 DIAGNOSIS — G89.29 CHRONIC LEFT SHOULDER PAIN: Primary | ICD-10-CM

## 2017-02-28 DIAGNOSIS — M25.512 CHRONIC LEFT SHOULDER PAIN: Primary | ICD-10-CM

## 2017-02-28 PROBLEM — G56.20 CUBITAL TUNNEL SYNDROME: Status: ACTIVE | Noted: 2017-02-28

## 2017-02-28 PROCEDURE — 99203 OFFICE O/P NEW LOW 30 MIN: CPT | Performed by: ORTHOPAEDIC SURGERY

## 2017-02-28 PROCEDURE — 20610 DRAIN/INJ JOINT/BURSA W/O US: CPT | Performed by: ORTHOPAEDIC SURGERY

## 2017-02-28 RX ORDER — METHYLPREDNISOLONE ACETATE 40 MG/ML
80 INJECTION, SUSPENSION INTRA-ARTICULAR; INTRALESIONAL; INTRAMUSCULAR; SOFT TISSUE
Status: COMPLETED | OUTPATIENT
Start: 2017-02-28 | End: 2017-02-28

## 2017-02-28 RX ORDER — BUPIVACAINE HYDROCHLORIDE 5 MG/ML
5 INJECTION, SOLUTION EPIDURAL; INTRACAUDAL
Status: COMPLETED | OUTPATIENT
Start: 2017-02-28 | End: 2017-02-28

## 2017-02-28 RX ORDER — LISINOPRIL 20 MG/1
TABLET ORAL
Refills: 5 | COMMUNITY
Start: 2017-02-17 | End: 2017-06-21 | Stop reason: SDUPTHER

## 2017-02-28 RX ADMIN — METHYLPREDNISOLONE ACETATE 80 MG: 40 INJECTION, SUSPENSION INTRA-ARTICULAR; INTRALESIONAL; INTRAMUSCULAR; SOFT TISSUE at 13:26

## 2017-02-28 RX ADMIN — BUPIVACAINE HYDROCHLORIDE 5 ML: 5 INJECTION, SOLUTION EPIDURAL; INTRACAUDAL at 13:26

## 2017-02-28 NOTE — PROGRESS NOTES
"  Patient: Mirian Fischer    YOB: 1970        History of Present Illness:   This is a 47-year-old right-hand-dominant female who presents for evaluation of left shoulder pain.  Patient denies a specific injury or trauma but is noticing is getting progressively worse over the last 3-4 months.  Has a 71 pound special needs son whom she often has to  and move Be getting progressively worse.  Pain when working overhead.  Has night pain also.  Also complaining of increasing numbness to the lateral aspect of her hand specific the fifth finger and part of the fourth finger in the lateral aspect of the forearm.  Feels like her great her  is getting weaker.  Denies any specific swelling of the arm.  Does have severe degenerative disc disease she states in her lower back.  Does take hydrocodone daily several times a day.  Non-insulin-dependent diabetic    Patient has significant smoking history she is currently trying to quit has been taking Chantix  Review of Systems:    Pertinent positives evaluated and listed in HPI, others systems completed by patient and reviewed today.         Physical Exam: 47 y.o. female  General Appearance:    Alert and oriented x 3, cooperative, in no acute distress                   Vitals:    02/28/17 1259   BP: 116/72   Pulse: 71   Weight: 245 lb (111 kg)   Height: 64\" (162.6 cm)           Somewhat overweight white female no obvious acute distress.  Skin around the shoulder is normal without any swelling or erythema or warmth noted  She can forward flex about 125-130°.  She can abduct about the same.  Good internal and external rotation or side.  Good strength of the subscapularis and external rotators.  Has good strength of the supraspinatus with pain with stressing.  Negative speed's test.  Mild impingement.  Internal rotation back to about L1 or L2 compared to about T10 for the right.  Left hand shows no evidence of any swelling.  Good capillary refill and radial " pulse.  Subjectively she has decrease in sensation in her fifth and fourth fingers.  She does have decreased pinch strength pinch strength noted      Radiology:       X-rays are unremarkable with some mild acromioclavicular degenerative changes.  MRI showing some degenerative partial tears within the rotator cuff but no obvious full-thickness tears.  Some degenerative changes are also noted in the labrum.  No acute findings are noted.  These are as read and reviewed by myself    Large Joint Arthrocentesis  Date/Time: 2/28/2017 1:26 PM  Consent given by: patient  Site marked: site marked  Supporting Documentation  Indications: pain and diagnostic evaluation   Procedure Details  Location: shoulder - L subacromial bursa  Needle size: 25 G  Approach: lateral  Medications administered: 80 mg methylPREDNISolone acetate 40 MG/ML; 5 mL bupivacaine (PF) 0.5 %  Patient tolerance: patient tolerated the procedure well with no immediate complications                Assessment/Plan:  Chronic left shoulder pain likely secondary to degenerative changes within the rotator cuff.  Going to try one subacromial injection with some steroid and Marcaine today.  Have also written perception for physical therapy to do gentle range of motion and strengthening program.  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  We'll also order EMG nerve conductions of her upperextremityto evaluate possible cubital tunnel versus cervical radiculopathy of her left arm.  We discussed the detrimental effects of smoking on the rotator cuff and also on degenerative disc disease and encourage her to keep on trying to quit.  We'll see her back after EMG nerve conduction studies have been completed        Discussion/Summary:    This chart was completed utilizing the dragon speech recognition software.  Grammatical errors, random word insertions, pronoun errors, and incomplete sentences or occasional consequences of the  system due to software limitations, ambient noise, and hardware issues.  Any questions or concerns about the content, text, or information contained within the body of this dictation should be directly addressed to the physician for clarification        This document was signed by Sherif Villagomez M.D. February 28, 2017 1:21 PM

## 2017-03-01 NOTE — PROGRESS NOTES
Called and did appeal over the phone and had to send office notes from chart to [facility] and appeals to [employer]. Did a release to [employer]. AARON

## 2017-03-03 ENCOUNTER — TELEPHONE (OUTPATIENT)
Dept: FAMILY MEDICINE CLINIC | Facility: CLINIC | Age: 47
End: 2017-03-03

## 2017-03-03 NOTE — TELEPHONE ENCOUNTER
----- Message from Devonte Mosqueda sent at 3/2/2017  1:11 PM EST -----  Regarding: vitals needed  Please fx over height and weight info from Oct to present [phone] for lapband surgery clearance.

## 2017-03-03 NOTE — TELEPHONE ENCOUNTER
This has been faxed 4 times now both ways and i called them and i have spoke to  office and they sent lab order and milka came in and done the labs they request and i have sent those

## 2017-03-16 ENCOUNTER — OFFICE VISIT (OUTPATIENT)
Dept: FAMILY MEDICINE CLINIC | Facility: CLINIC | Age: 47
End: 2017-03-16

## 2017-03-16 VITALS
WEIGHT: 240 LBS | BODY MASS INDEX: 40.97 KG/M2 | OXYGEN SATURATION: 95 % | HEART RATE: 90 BPM | HEIGHT: 64 IN | DIASTOLIC BLOOD PRESSURE: 68 MMHG | SYSTOLIC BLOOD PRESSURE: 110 MMHG | TEMPERATURE: 97.6 F

## 2017-03-16 DIAGNOSIS — M15.9 PRIMARY OSTEOARTHRITIS INVOLVING MULTIPLE JOINTS: ICD-10-CM

## 2017-03-16 DIAGNOSIS — E66.01 OBESITY, CLASS III, BMI 40-49.9 (MORBID OBESITY) (HCC): Primary | ICD-10-CM

## 2017-03-16 PROCEDURE — 99214 OFFICE O/P EST MOD 30 MIN: CPT | Performed by: NURSE PRACTITIONER

## 2017-03-16 RX ORDER — HYDROCODONE BITARTRATE AND ACETAMINOPHEN 10; 325 MG/1; MG/1
1 TABLET ORAL EVERY 6 HOURS PRN
Qty: 120 TABLET | Refills: 0
Start: 2017-03-16 | End: 2017-03-16 | Stop reason: SDUPTHER

## 2017-03-16 RX ORDER — HYDROCODONE BITARTRATE AND ACETAMINOPHEN 10; 325 MG/1; MG/1
1 TABLET ORAL EVERY 6 HOURS PRN
Qty: 120 TABLET | Refills: 0 | Status: SHIPPED | OUTPATIENT
Start: 2017-03-16 | End: 2017-04-19 | Stop reason: SDUPTHER

## 2017-03-16 NOTE — PROGRESS NOTES
Subjective   Mirian Fischer is a 47 y.o. female.     History of Present Illness      Obesity  Mirian is scheduled for Lap band surgery March 23, 2017. She is currently on the slim fast diet until after surgery. She reports doing well.     Hyperlipidemia  Recent labs show severely elevated triglycerides. She reports that she did drink coffee with creamer prior to labs.   Component      Latest Ref Rng 1/15/2015 8/3/2015 4/5/2016 2/27/2017   Total Cholesterol      0 - 200 mg/dL 192 133 134 147   Triglycerides      0 - 150 mg/dL 251 (H) 124 132 369 (H)   HDL Cholesterol      60 - 100 mg/dL 55 (L) 41 (L) 41 (L) 38 (L)   LDL Cholesterol      0 - 100 mg/dL 86 67 66 35   VLDL Cholesterol      mg/dL 50 (H) 25 26 73.8   LDL/HDL Ratio          0.93     Joint pain  Rates her pain as 2 on psr today. At its worse she rates it at 8 on psr. Patient states that pain is reduced by at least one half with current Medication/Treatment. Pain does interfere with ability to perform daily activities as well as enjoyment of life. Patient reports quality of life and mobility has improved as a result of current treatment. Patient describes pain as aching, burning, sharp, stabbing and throbbing. Pain is made worse from going up and down stairs, turning, bending, standing, walking and sitting . Pain is eased by medication, rest, Hydrocodone and Neurontin. [facility] urine drug screen will be obtained as indicated.     MRI left shoulder is on file. Mirian is under the care of ortho at this time.    Impression:  Partial distal supraspinatus bursal and articular surface tears.  Acromioclavicular joint space degeneration. Probable chronic  degeneration and tear of the superior labrum.          The following portions of the patient's history were reviewed and updated as appropriate: allergies, current medications, past family history, past medical history, past social history, past surgical history and problem list.    Review of Systems    Constitutional: Positive for fatigue. Negative for activity change, chills, fever and unexpected weight change.   Respiratory: Negative for cough, chest tightness and shortness of breath.    Cardiovascular: Negative for chest pain, palpitations and leg swelling.   Gastrointestinal: Positive for abdominal pain and constipation (stable with Linzess). Negative for blood in stool, diarrhea, nausea and vomiting.   Endocrine: Negative.    Genitourinary: Negative for dysuria and flank pain.   Musculoskeletal: Positive for arthralgias, back pain, gait problem and myalgias. Negative for neck stiffness.   Allergic/Immunologic: Negative.    Neurological: Negative for seizures and facial asymmetry.   Hematological: Negative for adenopathy. Does not bruise/bleed easily.   Psychiatric/Behavioral: Negative for self-injury and suicidal ideas. The patient is not nervous/anxious.    All other systems reviewed and are negative.      Objective   Physical Exam   Constitutional: She is oriented to person, place, and time. Vital signs are normal. She appears well-developed and well-nourished. No distress.     Obese adult female   HENT:   Head: Normocephalic and atraumatic.   Right Ear: External ear normal.   Left Ear: External ear normal.   Nose: Nose normal.   Mouth/Throat: Oropharynx is clear and moist. No oropharyngeal exudate.   Eyes: Conjunctivae, EOM and lids are normal. Pupils are equal, round, and reactive to light.   Neck: Trachea normal and normal range of motion. Neck supple. No thyromegaly present.   Cardiovascular: Normal rate, regular rhythm, normal heart sounds and intact distal pulses.    No murmur heard.  Pulmonary/Chest: Effort normal and breath sounds normal. No respiratory distress. She has no wheezes. She has no rales. She exhibits no tenderness.   Abdominal: Soft. Bowel sounds are normal. She exhibits no distension and no mass. There is no tenderness. There is no rebound and no guarding.   Musculoskeletal:         Left shoulder: She exhibits decreased range of motion, tenderness, crepitus, pain and decreased strength. She exhibits normal pulse.        Left wrist: She exhibits tenderness. She exhibits no swelling and no effusion.        Lumbar back: She exhibits decreased range of motion, tenderness, pain and spasm.        Left hand: She exhibits decreased capillary refill. Decreased sensation noted. Decreased strength noted.   Decreased lumbar curvature, there is pain with forward flexion. DTR's +2. No edema.     Patient has several missing tips of fingers ( right hand second and third digits).      Lymphadenopathy:     She has no cervical adenopathy.   Neurological: She is alert and oriented to person, place, and time. She has normal reflexes.   Weak  in the left hand as compared to the right.  Pulses are good.    Skin: Skin is warm and dry. No rash noted. No erythema. No pallor.   Psychiatric: She has a normal mood and affect. Her speech is normal and behavior is normal. Judgment and thought content normal. Her affect is not inappropriate. Cognition and memory are normal.   Denies thoughts of hurting self or others.   Nursing note and vitals reviewed.      Assessment/Plan   Mirian was seen today for hypertension, diabetes, osteoarthritis and diverticulitis.    Diagnoses and all orders for this visit:    Obesity, Class III, BMI 40-49.9 (morbid obesity)    Primary osteoarthritis involving multiple joints  -     Discontinue: HYDROcodone-acetaminophen (NORCO)  MG per tablet; Take 1 tablet by mouth Every 6 (Six) Hours As Needed for Moderate Pain (4-6).  -     HYDROcodone-acetaminophen (NORCO)  MG per tablet; Take 1 tablet by mouth Every 6 (Six) Hours As Needed for Moderate Pain (4-6).    Abnormal Labs  - labs reviewed. Will repeat with routine labs in 3-6 months. Life style changes are in place.      I have discussed diagnosis in detail today allowing time for questions and answers. Pt is aware of reasons to  seek urgent or emergent medical care as well as reasons to return to the clinic for evaluation. Possible side effects, interactions and progression of symptoms discussed as well. Pt / family states understanding.   Emotional support and active listening provided.   Marques and jennyfer are on file as consistent. Goal: promote quality of life and maintain mobility.   Follow up in one month, sooner if needed.

## 2017-03-20 RX ORDER — ATENOLOL 25 MG/1
TABLET ORAL
Qty: 60 TABLET | Refills: 6 | Status: SHIPPED | OUTPATIENT
Start: 2017-03-20 | End: 2017-10-25 | Stop reason: SDUPTHER

## 2017-04-19 ENCOUNTER — OFFICE VISIT (OUTPATIENT)
Dept: FAMILY MEDICINE CLINIC | Facility: CLINIC | Age: 47
End: 2017-04-19

## 2017-04-19 VITALS
SYSTOLIC BLOOD PRESSURE: 148 MMHG | OXYGEN SATURATION: 97 % | HEIGHT: 64 IN | TEMPERATURE: 98.3 F | HEART RATE: 73 BPM | BODY MASS INDEX: 38.76 KG/M2 | DIASTOLIC BLOOD PRESSURE: 80 MMHG | WEIGHT: 227 LBS

## 2017-04-19 DIAGNOSIS — I10 ESSENTIAL HYPERTENSION: ICD-10-CM

## 2017-04-19 DIAGNOSIS — Z13.9 SCREENING: ICD-10-CM

## 2017-04-19 DIAGNOSIS — D51.8 OTHER VITAMIN B12 DEFICIENCY ANEMIA: ICD-10-CM

## 2017-04-19 DIAGNOSIS — Z30.8 ENCOUNTER FOR OTHER CONTRACEPTIVE MANAGEMENT: ICD-10-CM

## 2017-04-19 DIAGNOSIS — Z30.9 ENCOUNTER FOR CONTRACEPTIVE MANAGEMENT, UNSPECIFIED CONTRACEPTIVE ENCOUNTER TYPE: ICD-10-CM

## 2017-04-19 DIAGNOSIS — M15.9 PRIMARY OSTEOARTHRITIS INVOLVING MULTIPLE JOINTS: ICD-10-CM

## 2017-04-19 DIAGNOSIS — Z72.0 TOBACCO USE: ICD-10-CM

## 2017-04-19 DIAGNOSIS — K57.20 DIVERTICULITIS OF LARGE INTESTINE WITH PERFORATION WITHOUT BLEEDING: Primary | ICD-10-CM

## 2017-04-19 PROBLEM — D51.9 ANEMIA DUE TO VITAMIN B12 DEFICIENCY: Status: ACTIVE | Noted: 2017-04-19

## 2017-04-19 LAB
B-HCG UR QL: NEGATIVE
INTERNAL NEGATIVE CONTROL: NEGATIVE
INTERNAL POSITIVE CONTROL: POSITIVE
Lab: NORMAL

## 2017-04-19 PROCEDURE — 81001 URINALYSIS AUTO W/SCOPE: CPT | Performed by: NURSE PRACTITIONER

## 2017-04-19 PROCEDURE — 87086 URINE CULTURE/COLONY COUNT: CPT | Performed by: NURSE PRACTITIONER

## 2017-04-19 PROCEDURE — 85025 COMPLETE CBC W/AUTO DIFF WBC: CPT | Performed by: NURSE PRACTITIONER

## 2017-04-19 PROCEDURE — 80053 COMPREHEN METABOLIC PANEL: CPT | Performed by: NURSE PRACTITIONER

## 2017-04-19 PROCEDURE — 99214 OFFICE O/P EST MOD 30 MIN: CPT | Performed by: NURSE PRACTITIONER

## 2017-04-19 PROCEDURE — 81025 URINE PREGNANCY TEST: CPT | Performed by: NURSE PRACTITIONER

## 2017-04-19 PROCEDURE — 96372 THER/PROPH/DIAG INJ SC/IM: CPT | Performed by: NURSE PRACTITIONER

## 2017-04-19 PROCEDURE — 99406 BEHAV CHNG SMOKING 3-10 MIN: CPT | Performed by: NURSE PRACTITIONER

## 2017-04-19 RX ORDER — NEOMYCIN/POLYMYXIN B/PRAMOXINE 3.5-10K-1
CREAM (GRAM) TOPICAL
COMMUNITY
End: 2017-12-20 | Stop reason: SDUPTHER

## 2017-04-19 RX ORDER — CIPROFLOXACIN 500 MG/1
500 TABLET, FILM COATED ORAL 2 TIMES DAILY
Qty: 20 TABLET | Refills: 0 | Status: SHIPPED | OUTPATIENT
Start: 2017-04-19 | End: 2017-04-29

## 2017-04-19 RX ORDER — CYANOCOBALAMIN 1000 UG/ML
1000 INJECTION, SOLUTION INTRAMUSCULAR; SUBCUTANEOUS
Status: SHIPPED | OUTPATIENT
Start: 2017-04-19

## 2017-04-19 RX ORDER — MEDROXYPROGESTERONE ACETATE 150 MG/ML
150 INJECTION, SUSPENSION INTRAMUSCULAR ONCE
Status: COMPLETED | OUTPATIENT
Start: 2017-04-19 | End: 2017-04-19

## 2017-04-19 RX ORDER — HYDROCODONE BITARTRATE AND ACETAMINOPHEN 10; 325 MG/1; MG/1
1 TABLET ORAL EVERY 6 HOURS PRN
Qty: 120 TABLET | Refills: 0 | Status: SHIPPED | OUTPATIENT
Start: 2017-04-19 | End: 2017-05-22 | Stop reason: SDUPTHER

## 2017-04-19 RX ADMIN — MEDROXYPROGESTERONE ACETATE 150 MG: 150 INJECTION, SUSPENSION INTRAMUSCULAR at 16:37

## 2017-04-19 RX ADMIN — CYANOCOBALAMIN 1000 MCG: 1000 INJECTION, SOLUTION INTRAMUSCULAR; SUBCUTANEOUS at 16:33

## 2017-04-19 NOTE — PROGRESS NOTES
Subjective   Mirian Fischer is a 47 y.o. female.     History of Present Illness     4 weeks post Lap-Band surgery.  Pt states she did not keep her appointment to follow up with Bariatric surgeon. She also reports that at 4 weeks out she is eating solid food, bread and drinking soda. She has not quit smoking.   She does show a 13 pound weight loss from last months visit.     Diverticulitis  Pt states that she is having an acute episode of symptoms including constipation, difficulty urinating and abdominal pain. She was admitted to [facility] and treated by Dr. Pruitt in 2016. Surgical intervention was recommended though she missed her scheduled follow up with surgeon.     MRI left shoulder findings listed below. She is under the care of Ortho.  Impression:  Partial distal supraspinatus bursal and articular surface tears.  Acromioclavicular joint space degeneration. Probable chronic  degeneration and tear of the superior labrum.      Joint pain  Rates her pain as 4 on psr today. At its worse this month she rates it at 8 on psr. Patient states that pain is reduced by at least one half with current Medication/Treatment. Pain does interfere with ability to perform daily activities as well as enjoyment of life. Patient reports quality of life and mobility has improved as a result of current treatment. Patient describes pain as aching, burning, sharp, stabbing and throbbing. Pain is made worse from going up and down stairs, turning, bending, standing, walking and sitting . Pain is eased by medication, rest, Hydrocodone and Neurontin. [facility] urine drug screen will be obtained as indicated. Recent changes in Norco regimen have helped decrease her joint pain.     Tobacco abuse  Was doing well on Chantix though she reports that she stopped Chantix due to nausea. Now she is smoking 1/2- 1 pack a day.        The following portions of the patient's history were reviewed and updated as appropriate: allergies, current  medications, past family history, past medical history, past social history, past surgical history and problem list.    Review of Systems   Constitutional: Positive for activity change and fatigue. Negative for appetite change, chills, fever and unexpected weight change.   HENT: Negative for ear pain, sneezing and sore throat.    Respiratory: Negative for cough, chest tightness, shortness of breath and wheezing.    Cardiovascular: Negative for chest pain and palpitations.   Gastrointestinal: Positive for abdominal pain (right lower quad) and constipation (controlled with Linzess). Negative for blood in stool, diarrhea and vomiting.   Endocrine: Negative.    Genitourinary: Positive for difficulty urinating, urgency and vaginal discharge. Negative for dysuria and vaginal pain.        Urinary incontinence not of new onset     Musculoskeletal: Positive for arthralgias, back pain, myalgias and neck pain. Negative for neck stiffness.   Skin: Negative for rash and wound.        Surgical wound    Allergic/Immunologic: Negative for environmental allergies and food allergies.   Neurological: Positive for weakness. Negative for seizures.   Hematological: Negative.    Psychiatric/Behavioral: Negative for self-injury and suicidal ideas. The patient is not nervous/anxious.    All other systems reviewed and are negative.      Objective   Physical Exam   Constitutional: She is oriented to person, place, and time. She appears well-developed and well-nourished. No distress.   HENT:   Head: Normocephalic.   Right Ear: External ear normal.   Left Ear: External ear normal.   Nose: Nose normal.   Mouth/Throat: Oropharynx is clear and moist. No oropharyngeal exudate.   Eyes: Pupils are equal, round, and reactive to light.   Neck: Normal range of motion. Neck supple. No tracheal deviation present. No thyromegaly present.   Cardiovascular: Normal rate, regular rhythm and normal heart sounds.  Exam reveals no gallop and no friction rub.   "  No murmur heard.  Pulmonary/Chest: Effort normal and breath sounds normal. No respiratory distress. She has no wheezes. She has no rales. She exhibits no tenderness.   Abdominal: Soft. Normal appearance and bowel sounds are normal. She exhibits no distension and no mass. There is tenderness in the right lower quadrant. There is no rebound and no guarding.   No jar tenderness    Musculoskeletal:        Left shoulder: She exhibits decreased range of motion, tenderness and crepitus.        Cervical back: She exhibits decreased range of motion and spasm.        Lumbar back: She exhibits decreased range of motion and tenderness.        Arms:  Neurological: She is alert and oriented to person, place, and time. She has normal reflexes.   CN 2-12 grossly intact    Skin: Skin is warm and dry. No rash noted. She is not diaphoretic. No erythema.   Psychiatric: She has a normal mood and affect. Her speech is normal and behavior is normal. Judgment and thought content normal. Cognition and memory are normal.   Vitals reviewed.  /80 (BP Location: Right arm, Patient Position: Sitting, Cuff Size: Adult)  Pulse 73  Temp 98.3 °F (36.8 °C) (Tympanic)   Ht 64\" (162.6 cm)  Wt 227 lb (103 kg)  SpO2 97%  BMI 38.96 kg/m2      Assessment/Plan   Diagnoses and all orders for this visit:    Diverticulitis of large intestine with perforation without bleeding    Primary osteoarthritis involving multiple joints  -     HYDROcodone-acetaminophen (NORCO)  MG per tablet; Take 1 tablet by mouth Every 6 (Six) Hours As Needed for Moderate Pain (4-6).  -     CBC & Differential  -     Comprehensive Metabolic Panel  -     CBC Auto Differential    Screening  -     CBC & Differential  -     Urinalysis w/Culture if Indicated  -     Comprehensive Metabolic Panel  -     CBC Auto Differential    Essential hypertension  -     CBC & Differential  -     CBC Auto Differential    Other vitamin B12 deficiency anemia  -     cyanocobalamin injection " 1,000 mcg; Inject 1 mL into the shoulder, thigh, or buttocks Every 28 (Twenty-Eight) Days.    Encounter for contraceptive management, unspecified contraceptive encounter type  -     MedroxyPROGESTERone Acetate (DEPO-PROVERA) injection 150 mg; Inject 1 mL into the shoulder, thigh, or buttocks 1 (One) Time.    Encounter for other contraceptive management  -     POC Pregnancy, Urine    Tobacco use    Other orders  -     ciprofloxacin (CIPRO) 500 MG tablet; Take 1 tablet by mouth 2 (Two) Times a Day for 10 days.      Start Cipro, obtain Cmp, CBC and UA with culture today.  Have scheduled for follow up at [facility] tomorrow. Stressed the importance of keeping this appointment. Discussed reasons to seek urgent medical attention.   Reviewed bariatric surgery post diet and precautions. Discussed avoidance of bread, thick meats such as pork chops and steak and encouraged compliance with diet as well as follow up visits. Reviewed need to avoid all carbonated drinks. Mirian states understanding.   Smoke cessation education provided > 4 minutes today. Encouraged Mirian to start back on the chantix and stop smoking. Reviewed the risks of smoking post surgery. Praised pt for 13 pound weight loss.  B-12 injection today.  Routine depo-provera injection administered per pt supply with negative visual pregnancy test obtained.    I have discussed diagnosis in detail today allowing time for questions and answers. Pt is aware of reasons to seek urgent or emergent medical care as well as reasons to return to the clinic for evaluation. Possible side effects, interactions and progression of symptoms discussed as well. Pt / family states understanding.

## 2017-04-20 ENCOUNTER — OFFICE VISIT (OUTPATIENT)
Dept: SURGERY | Facility: CLINIC | Age: 47
End: 2017-04-20

## 2017-04-20 ENCOUNTER — TELEPHONE (OUTPATIENT)
Dept: FAMILY MEDICINE CLINIC | Facility: CLINIC | Age: 47
End: 2017-04-20

## 2017-04-20 VITALS — HEIGHT: 64 IN | WEIGHT: 227 LBS | BODY MASS INDEX: 38.76 KG/M2

## 2017-04-20 DIAGNOSIS — K57.20 DIVERTICULITIS OF LARGE INTESTINE WITH PERFORATION WITHOUT BLEEDING: Primary | ICD-10-CM

## 2017-04-20 LAB
ALBUMIN SERPL-MCNC: 3.9 G/DL (ref 3.5–5)
ALBUMIN/GLOB SERPL: 1.6 G/DL (ref 1.5–2.5)
ALP SERPL-CCNC: 87 U/L (ref 35–104)
ALT SERPL W P-5'-P-CCNC: 21 U/L (ref 10–36)
ANION GAP SERPL CALCULATED.3IONS-SCNC: 8.8 MMOL/L (ref 3.6–11.2)
AST SERPL-CCNC: 18 U/L (ref 10–30)
BACTERIA UR QL AUTO: ABNORMAL /HPF
BASOPHILS # BLD AUTO: 0.07 10*3/MM3 (ref 0–0.3)
BASOPHILS NFR BLD AUTO: 0.6 % (ref 0–2)
BILIRUB SERPL-MCNC: 0.2 MG/DL (ref 0.2–1.8)
BILIRUB UR QL STRIP: NEGATIVE
BUN BLD-MCNC: 8 MG/DL (ref 7–21)
BUN/CREAT SERPL: 16.7 (ref 7–25)
CALCIUM SPEC-SCNC: 9.4 MG/DL (ref 7.7–10)
CHLORIDE SERPL-SCNC: 106 MMOL/L (ref 99–112)
CLARITY UR: CLEAR
CO2 SERPL-SCNC: 26.2 MMOL/L (ref 24.3–31.9)
COLOR UR: ABNORMAL
CREAT BLD-MCNC: 0.48 MG/DL (ref 0.43–1.29)
DEPRECATED RDW RBC AUTO: 50 FL (ref 37–54)
EOSINOPHIL # BLD AUTO: 0.54 10*3/MM3 (ref 0–0.7)
EOSINOPHIL NFR BLD AUTO: 4.7 % (ref 0–5)
ERYTHROCYTE [DISTWIDTH] IN BLOOD BY AUTOMATED COUNT: 15.5 % (ref 11.5–14.5)
GFR SERPL CREATININE-BSD FRML MDRD: 138 ML/MIN/1.73
GLOBULIN UR ELPH-MCNC: 2.5 GM/DL
GLUCOSE BLD-MCNC: 94 MG/DL (ref 70–110)
GLUCOSE UR STRIP-MCNC: NEGATIVE MG/DL
HCT VFR BLD AUTO: 43 % (ref 37–47)
HGB BLD-MCNC: 12.8 G/DL (ref 12–16)
HGB UR QL STRIP.AUTO: NEGATIVE
HYALINE CASTS UR QL AUTO: ABNORMAL /LPF
IMM GRANULOCYTES # BLD: 0.03 10*3/MM3 (ref 0–0.03)
IMM GRANULOCYTES NFR BLD: 0.3 % (ref 0–0.5)
KETONES UR QL STRIP: ABNORMAL
LEUKOCYTE ESTERASE UR QL STRIP.AUTO: ABNORMAL
LYMPHOCYTES # BLD AUTO: 3.12 10*3/MM3 (ref 1–3)
LYMPHOCYTES NFR BLD AUTO: 27 % (ref 21–51)
MCH RBC QN AUTO: 26.7 PG (ref 27–33)
MCHC RBC AUTO-ENTMCNC: 29.8 G/DL (ref 33–37)
MCV RBC AUTO: 89.6 FL (ref 80–94)
MONOCYTES # BLD AUTO: 0.82 10*3/MM3 (ref 0.1–0.9)
MONOCYTES NFR BLD AUTO: 7.1 % (ref 0–10)
NEUTROPHILS # BLD AUTO: 6.96 10*3/MM3 (ref 1.4–6.5)
NEUTROPHILS NFR BLD AUTO: 60.3 % (ref 30–70)
NITRITE UR QL STRIP: NEGATIVE
OSMOLALITY SERPL CALC.SUM OF ELEC: 279.3 MOSM/KG (ref 273–305)
PH UR STRIP.AUTO: 6 [PH] (ref 5–8)
PLATELET # BLD AUTO: 442 10*3/MM3 (ref 130–400)
PMV BLD AUTO: 10.2 FL (ref 6–10)
POTASSIUM BLD-SCNC: 4 MMOL/L (ref 3.5–5.3)
PROT SERPL-MCNC: 6.4 G/DL (ref 6–8)
PROT UR QL STRIP: NEGATIVE
RBC # BLD AUTO: 4.8 10*6/MM3 (ref 4.2–5.4)
RBC # UR: ABNORMAL /HPF
REF LAB TEST METHOD: ABNORMAL
SODIUM BLD-SCNC: 141 MMOL/L (ref 135–153)
SP GR UR STRIP: 1.03 (ref 1–1.03)
SQUAMOUS #/AREA URNS HPF: ABNORMAL /HPF
UROBILINOGEN UR QL STRIP: ABNORMAL
WBC NRBC COR # BLD: 11.54 10*3/MM3 (ref 4.5–12.5)
WBC UR QL AUTO: ABNORMAL /HPF

## 2017-04-20 PROCEDURE — 99214 OFFICE O/P EST MOD 30 MIN: CPT | Performed by: SURGERY

## 2017-04-20 RX ORDER — SULFAMETHOXAZOLE AND TRIMETHOPRIM 800; 160 MG/1; MG/1
1 TABLET ORAL 2 TIMES DAILY
COMMUNITY
End: 2017-07-12

## 2017-04-20 RX ORDER — POTASSIUM CHLORIDE 750 MG/1
TABLET, FILM COATED, EXTENDED RELEASE ORAL
Qty: 60 TABLET | Refills: 5 | Status: SHIPPED | OUTPATIENT
Start: 2017-04-20 | End: 2017-07-12

## 2017-04-20 RX ORDER — METRONIDAZOLE 500 MG/1
500 TABLET ORAL 2 TIMES DAILY
Qty: 20 TABLET | Refills: 0 | Status: SHIPPED | OUTPATIENT
Start: 2017-04-20 | End: 2017-04-30

## 2017-04-20 NOTE — PROGRESS NOTES
Subjective   Mirian Fischer is a 47 y.o. female.     History of Present Illness She had been admitted with diverticulitis and an abscess in Nov 2016. It could not be drained percutaneously and she had resolved with antibiotics. She had a colonosocpy that just showed the diverticuli. She had vaginal drainage of the pus spontaneously then but She did not follow up after that and had a lap band surgery about a month ago and since then has had more lower abdominal pain and feels like she did last time she had the diverticulitis. She has not had any  xrays and her cbc was normal She had a little in her urine. She had more vaginal drainage last week.     The following portions of the patient's history were reviewed and updated as appropriate: allergies, current medications, past family history, past medical history, past social history, past surgical history and problem list.    Review of Systems   Constitutional: Negative for activity change, appetite change, chills, fever and unexpected weight change.   HENT: Negative for congestion, facial swelling and sore throat.    Eyes: Negative for photophobia and visual disturbance.   Respiratory: Negative for chest tightness, shortness of breath and wheezing.    Cardiovascular: Negative for chest pain, palpitations and leg swelling.   Gastrointestinal: Positive for abdominal pain. Negative for abdominal distention, anal bleeding, blood in stool, constipation, diarrhea, nausea, rectal pain and vomiting.   Endocrine: Negative for cold intolerance, heat intolerance, polydipsia and polyuria.   Genitourinary: Negative for difficulty urinating, dysuria, flank pain and urgency.   Musculoskeletal: Negative for back pain and myalgias.   Skin: Negative for rash and wound.   Allergic/Immunologic: Negative for immunocompromised state.   Neurological: Negative for dizziness, seizures, syncope, light-headedness, numbness and headaches.   Hematological: Negative for adenopathy. Does not  bruise/bleed easily.   Psychiatric/Behavioral: Negative for behavioral problems and confusion. The patient is not nervous/anxious.        Objective   Physical Exam   Constitutional: She is oriented to person, place, and time. She appears well-developed and well-nourished. She does not appear ill. No distress.   HENT:   Head: Normocephalic. Head is without laceration. Hair is normal.   Right Ear: Hearing and ear canal normal.   Left Ear: Hearing and ear canal normal.   Nose: Nose normal. No sinus tenderness. No epistaxis. Right sinus exhibits no maxillary sinus tenderness and no frontal sinus tenderness. Left sinus exhibits no maxillary sinus tenderness and no frontal sinus tenderness.   Eyes: Conjunctivae and lids are normal. Pupils are equal, round, and reactive to light.   Neck: Normal range of motion. No JVD present. No tracheal tenderness present. No tracheal deviation present. No thyroid mass and no thyromegaly present.   Cardiovascular: Normal rate and regular rhythm.  Exam reveals no gallop.    No murmur heard.  Pulmonary/Chest: Effort normal and breath sounds normal. No stridor. She has no wheezes. She exhibits no tenderness.   Abdominal: Soft. Bowel sounds are normal. She exhibits no distension, no ascites and no mass. There is tenderness. There is no rebound and no guarding. No hernia.   Musculoskeletal: She exhibits no edema or deformity.   Lymphadenopathy:     She has no cervical adenopathy.     She has no axillary adenopathy.        Right: No inguinal and no supraclavicular adenopathy present.        Left: No inguinal and no supraclavicular adenopathy present.   Neurological: She is alert and oriented to person, place, and time. She exhibits normal muscle tone.   Skin: Skin is warm, dry and intact. No rash noted. No erythema. No pallor.   Psychiatric: She has a normal mood and affect. Her behavior is normal. Thought content normal.   Vitals reviewed.      Assessment/Plan   Mirian was seen today for  follow-up.    Diagnoses and all orders for this visit:    Diverticulitis of large intestine with perforation without bleeding    Get CT and start antibiotic.

## 2017-04-20 NOTE — TELEPHONE ENCOUNTER
Spoke with milka and dr aldrich put her on flagyl today and she will take the cipro also for her UTI

## 2017-04-22 LAB — BACTERIA SPEC AEROBE CULT: NORMAL

## 2017-04-24 ENCOUNTER — HOSPITAL ENCOUNTER (OUTPATIENT)
Dept: CT IMAGING | Facility: HOSPITAL | Age: 47
Discharge: HOME | End: 2017-04-24
Attending: SURGERY | Admitting: SURGERY

## 2017-04-24 DIAGNOSIS — K57.20 DIVERTICULITIS OF LARGE INTESTINE WITH PERFORATION WITHOUT BLEEDING: ICD-10-CM

## 2017-04-24 PROCEDURE — 74177 CT ABD & PELVIS W/CONTRAST: CPT

## 2017-04-24 PROCEDURE — 74177 CT ABD & PELVIS W/CONTRAST: CPT | Performed by: RADIOLOGY

## 2017-04-24 PROCEDURE — 0 IOPAMIDOL 61 % SOLUTION: Performed by: SURGERY

## 2017-04-24 RX ADMIN — IOPAMIDOL 100 ML: 612 INJECTION, SOLUTION INTRAVENOUS at 13:40

## 2017-05-02 ENCOUNTER — OFFICE VISIT (OUTPATIENT)
Dept: SURGERY | Facility: CLINIC | Age: 47
End: 2017-05-02

## 2017-05-02 VITALS — HEIGHT: 64 IN | WEIGHT: 221.8 LBS | BODY MASS INDEX: 37.87 KG/M2

## 2017-05-02 DIAGNOSIS — K57.20 DIVERTICULITIS OF LARGE INTESTINE WITH PERFORATION WITHOUT BLEEDING: Primary | ICD-10-CM

## 2017-05-02 PROCEDURE — 99214 OFFICE O/P EST MOD 30 MIN: CPT | Performed by: SURGERY

## 2017-05-05 ENCOUNTER — OFFICE VISIT (OUTPATIENT)
Dept: UROLOGY | Facility: CLINIC | Age: 47
End: 2017-05-05

## 2017-05-05 DIAGNOSIS — N39.0 URINARY TRACT INFECTION WITHOUT HEMATURIA, SITE UNSPECIFIED: Primary | ICD-10-CM

## 2017-05-05 DIAGNOSIS — N39.0 URINARY TRACT INFECTION, SITE UNSPECIFIED: ICD-10-CM

## 2017-05-05 LAB
BILIRUB BLD-MCNC: NEGATIVE MG/DL
CLARITY, POC: CLEAR
COLOR UR: YELLOW
GLUCOSE UR STRIP-MCNC: NEGATIVE MG/DL
KETONES UR QL: NEGATIVE
LEUKOCYTE EST, POC: ABNORMAL
NITRITE UR-MCNC: NEGATIVE MG/ML
PH UR: 6 [PH] (ref 5–8)
PROT UR STRIP-MCNC: ABNORMAL MG/DL
RBC # UR STRIP: ABNORMAL /UL
SP GR UR: 1.03 (ref 1–1.03)
UROBILINOGEN UR QL: NORMAL

## 2017-05-05 PROCEDURE — 99202 OFFICE O/P NEW SF 15 MIN: CPT | Performed by: UROLOGY

## 2017-05-05 PROCEDURE — 87086 URINE CULTURE/COLONY COUNT: CPT | Performed by: UROLOGY

## 2017-05-05 PROCEDURE — 51798 US URINE CAPACITY MEASURE: CPT | Performed by: UROLOGY

## 2017-05-05 PROCEDURE — 81003 URINALYSIS AUTO W/O SCOPE: CPT | Performed by: UROLOGY

## 2017-05-09 LAB
BACTERIA UR CULT: NORMAL
Lab: NORMAL

## 2017-05-22 ENCOUNTER — OFFICE VISIT (OUTPATIENT)
Dept: FAMILY MEDICINE CLINIC | Facility: CLINIC | Age: 47
End: 2017-05-22

## 2017-05-22 VITALS
TEMPERATURE: 96.4 F | BODY MASS INDEX: 36.7 KG/M2 | HEIGHT: 64 IN | OXYGEN SATURATION: 99 % | DIASTOLIC BLOOD PRESSURE: 70 MMHG | SYSTOLIC BLOOD PRESSURE: 130 MMHG | HEART RATE: 81 BPM | WEIGHT: 215 LBS

## 2017-05-22 DIAGNOSIS — M15.9 PRIMARY OSTEOARTHRITIS INVOLVING MULTIPLE JOINTS: ICD-10-CM

## 2017-05-22 DIAGNOSIS — R15.9 URINARY AND BOWEL INCONTINENCE: ICD-10-CM

## 2017-05-22 DIAGNOSIS — R32 URINARY AND BOWEL INCONTINENCE: ICD-10-CM

## 2017-05-22 DIAGNOSIS — D51.8 OTHER VITAMIN B12 DEFICIENCY ANEMIA: ICD-10-CM

## 2017-05-22 DIAGNOSIS — Z79.899 HIGH RISK MEDICATION USE: Primary | ICD-10-CM

## 2017-05-22 DIAGNOSIS — K57.20 DIVERTICULITIS OF LARGE INTESTINE WITH PERFORATION WITHOUT BLEEDING: ICD-10-CM

## 2017-05-22 PROCEDURE — 99214 OFFICE O/P EST MOD 30 MIN: CPT | Performed by: NURSE PRACTITIONER

## 2017-05-22 PROCEDURE — 96372 THER/PROPH/DIAG INJ SC/IM: CPT | Performed by: NURSE PRACTITIONER

## 2017-05-22 RX ORDER — HYDROCHLOROTHIAZIDE 25 MG/1
TABLET ORAL
Qty: 30 TABLET | Refills: 5 | Status: SHIPPED | OUTPATIENT
Start: 2017-05-22 | End: 2017-12-20 | Stop reason: SDUPTHER

## 2017-05-22 RX ORDER — HYDROCODONE BITARTRATE AND ACETAMINOPHEN 10; 325 MG/1; MG/1
1 TABLET ORAL EVERY 6 HOURS PRN
Qty: 120 TABLET | Refills: 0 | Status: SHIPPED | OUTPATIENT
Start: 2017-05-22 | End: 2017-06-21 | Stop reason: SDUPTHER

## 2017-05-22 RX ORDER — SIMVASTATIN 20 MG
TABLET ORAL
Qty: 30 TABLET | Refills: 5 | Status: SHIPPED | OUTPATIENT
Start: 2017-05-22 | End: 2017-12-20 | Stop reason: SDUPTHER

## 2017-05-22 RX ORDER — GABAPENTIN 300 MG/1
CAPSULE ORAL
Qty: 90 CAPSULE | Refills: 2 | Status: SHIPPED | OUTPATIENT
Start: 2017-05-22 | End: 2017-08-24 | Stop reason: SDUPTHER

## 2017-05-22 RX ORDER — CIPROFLOXACIN 250 MG/1
250 TABLET, FILM COATED ORAL 2 TIMES DAILY
Qty: 20 TABLET | Refills: 0 | Status: SHIPPED | OUTPATIENT
Start: 2017-05-22 | End: 2017-06-01

## 2017-05-22 RX ADMIN — CYANOCOBALAMIN 1000 MCG: 1000 INJECTION, SOLUTION INTRAMUSCULAR; SUBCUTANEOUS at 14:47

## 2017-06-20 ENCOUNTER — PROCEDURE VISIT (OUTPATIENT)
Dept: UROLOGY | Facility: CLINIC | Age: 47
End: 2017-06-20

## 2017-06-20 VITALS
WEIGHT: 215 LBS | DIASTOLIC BLOOD PRESSURE: 67 MMHG | SYSTOLIC BLOOD PRESSURE: 103 MMHG | HEART RATE: 80 BPM | HEIGHT: 64 IN | BODY MASS INDEX: 36.7 KG/M2

## 2017-06-20 DIAGNOSIS — R10.2 PELVIC PAIN: Primary | ICD-10-CM

## 2017-06-20 LAB
BILIRUB BLD-MCNC: ABNORMAL MG/DL
CLARITY, POC: ABNORMAL
COLOR UR: YELLOW
GLUCOSE UR STRIP-MCNC: ABNORMAL MG/DL
KETONES UR QL: ABNORMAL
LEUKOCYTE EST, POC: ABNORMAL
NITRITE UR-MCNC: NEGATIVE MG/ML
PH UR: 5.5 [PH] (ref 5–8)
PROT UR STRIP-MCNC: ABNORMAL MG/DL
RBC # UR STRIP: NEGATIVE /UL
SP GR UR: 1.02 (ref 1–1.03)
UROBILINOGEN UR QL: NORMAL

## 2017-06-20 PROCEDURE — 99213 OFFICE O/P EST LOW 20 MIN: CPT | Performed by: UROLOGY

## 2017-06-20 PROCEDURE — 81003 URINALYSIS AUTO W/O SCOPE: CPT | Performed by: UROLOGY

## 2017-06-20 PROCEDURE — 52000 CYSTOURETHROSCOPY: CPT | Performed by: UROLOGY

## 2017-06-20 PROCEDURE — 96372 THER/PROPH/DIAG INJ SC/IM: CPT | Performed by: UROLOGY

## 2017-06-20 RX ORDER — CEFTRIAXONE 1 G/1
1 INJECTION, POWDER, FOR SOLUTION INTRAMUSCULAR; INTRAVENOUS ONCE
Status: COMPLETED | OUTPATIENT
Start: 2017-06-20 | End: 2017-06-20

## 2017-06-20 RX ADMIN — CEFTRIAXONE 1 G: 1 INJECTION, POWDER, FOR SOLUTION INTRAMUSCULAR; INTRAVENOUS at 09:56

## 2017-06-20 NOTE — PROGRESS NOTES
Chief Complaint:       Chief Complaint   Patient presents with   • Pelvic Pain     Cystoscopy for possible colovesical fistula.            HPI:       HPI  Pt denies again pneumaturia.  She has had diverticulitis and had purulent drainage per vagina in the past.  Dr. Pruitt referred her for cystoscopy      PMI:      Past Medical History:   Diagnosis Date   • Allergic rhinitis    • Anxiety disorder    • Cellulitis and abscess     labia and groin   • Conjunctival folliculosis    • Constipation    • Controlled type 2 DM with peripheral circulatory disorder    • Degenerative disc disease at L5-S1 level    • Dermatitis due to drug    • Flushing    • Herniated cervical disc    • Hypertension    • Hypertension    • Impacted cerumen    • Lumbago    • Malaise and fatigue    • Nausea    • Osteoarthritis, multiple sites    • Sleep apnea    • Swelling, limb    • Tobacco use    • Vitamin D deficiency            Medications:        Current Outpatient Prescriptions:   •  atenolol (TENORMIN) 25 MG tablet, TAKE 1 TABLET BY MOUTH TWICE DAILY, Disp: 60 tablet, Rfl: 6  •  diclofenac (VOLTAREN) 75 MG EC tablet, Take 1 tablet by mouth 2 (Two) Times a Day., Disp: 60 tablet, Rfl: 5  •  DULoxetine (CYMBALTA) 60 MG capsule, Take 1 capsule by mouth Daily., Disp: 30 capsule, Rfl: 3  •  esomeprazole (NEXIUM) 40 MG capsule, Take 1 capsule by mouth Every Morning Before Breakfast., Disp: 30 capsule, Rfl: 5  •  fluticasone (FLONASE) 50 MCG/ACT nasal spray, 1 spray into each nostril Daily. Administer 1 sprays in each nostril for each dose., Disp: 1 each, Rfl: 5  •  gabapentin (NEURONTIN) 300 MG capsule, TAKE 1 CAPSULE BY MOUTH THREE TIMES A DAY, Disp: 90 capsule, Rfl: 2  •  hydrochlorothiazide (HYDRODIURIL) 25 MG tablet, TAKE ONE TABLET BY MOUTH EVERY DAY, Disp: 30 tablet, Rfl: 5  •  HYDROcodone-acetaminophen (NORCO)  MG per tablet, Take 1 tablet by mouth Every 6 (Six) Hours As Needed for Moderate Pain (4-6)., Disp: 120 tablet, Rfl: 0  •   levocetirizine (XYZAL) 5 MG tablet, Take 1 tablet by mouth Every Evening., Disp: 30 tablet, Rfl: 5  •  linaclotide (LINZESS) 290 MCG capsule capsule, Take 290 mcg by mouth Every Morning Before Breakfast., Disp: 30 capsule, Rfl: 5  •  lisinopril (PRINIVIL,ZESTRIL) 20 MG tablet, , Disp: , Rfl: 5  •  medroxyPROGESTERone (DEPO-PROVERA) 150 MG/ML injection, Inject 1 mL into the shoulder, thigh, or buttocks every 3 (three) months., Disp: 1 mL, Rfl: 3  •  metFORMIN XR (GLUCOPHAGE-XR) 500 MG 24 hr tablet, Take 1 tablet by mouth Daily., Disp: 30 tablet, Rfl: 5  •  methocarbamol (ROBAXIN) 750 MG tablet, Take 1 tablet by mouth 2 (Two) Times a Day., Disp: 60 tablet, Rfl: 5  •  Multiple Vitamins-Minerals (MULTI-VITAMIN GUMMIES) chewable tablet, Chew., Disp: , Rfl:   •  nizatidine (AXID) 150 MG capsule, Take 1 capsule by mouth 2 (Two) Times a Day., Disp: 60 capsule, Rfl: 5  •  potassium chloride (K-DUR) 10 MEQ CR tablet, Take 1 tablet by mouth 2 (Two) Times a Day., Disp: 60 tablet, Rfl: 5  •  potassium chloride (K-DUR) 10 MEQ CR tablet, TAKE ONE TABLET BY MOUTH TWO TIMES A DAY, Disp: 60 tablet, Rfl: 5  •  simvastatin (ZOCOR) 20 MG tablet, TAKE 1 TABLET BY MOUTH DAILY., Disp: 30 tablet, Rfl: 5  •  sulfamethoxazole-trimethoprim (BACTRIM DS,SEPTRA DS) 800-160 MG per tablet, Take 1 tablet by mouth 2 (Two) Times a Day., Disp: , Rfl:   •  varenicline (CHANTIX) 1 MG tablet, Take 1 tablet by mouth 2 (Two) Times a Day., Disp: 60 tablet, Rfl: 5  •  vitamin D (ERGOCALCIFEROL) 30984 UNITS capsule capsule, Take 1 capsule by mouth Every 7 (Seven) Days. wednesday, Disp: 4 capsule, Rfl: 5    Current Facility-Administered Medications:   •  cyanocobalamin injection 1,000 mcg, 1,000 mcg, Intramuscular, Q28 Days, CHARMAINE Dejesus, 1,000 mcg at 17 1447        Allergies:      No Known Allergies        Past Surgical Histroy:      Past Surgical History:   Procedure Laterality Date   •  SECTION     • CHOLECYSTECTOMY     •  COLONOSCOPY N/A 11/17/2016    Procedure: COLONOSCOPY;  Surgeon: Bakari Pruitt MD;  Location: [facility];  Service:    • FINGER REIMPLANTATION     • LAPAROSCOPIC GASTRIC BANDING     • TYMPANOPLASTY     • WISDOM TOOTH EXTRACTION             Social History:      Social History     Social History   • Marital status:      Spouse name: N/A   • Number of children: N/A   • Years of education: N/A     Occupational History   • Not on file.     Social History Main Topics   • Smoking status: Current Every Day Smoker     Packs/day: 1.00     Types: Cigarettes   • Smokeless tobacco: Never Used   • Alcohol use No   • Drug use: No   • Sexual activity: No     Other Topics Concern   • Not on file     Social History Narrative           Family History:      Family History   Problem Relation Age of Onset   • Arthritis Mother    • Asthma Mother    • Cancer Mother    • COPD Mother    • Depression Mother    • Diabetes Mother    • Heart disease Mother    • Hyperlipidemia Mother    • Hypertension Mother    • Arthritis Father    • Asthma Father    • COPD Father    • Diabetes Father    • Hyperlipidemia Father    • Hypertension Father    • Arthritis Sister    • Diabetes Sister    • Hyperlipidemia Sister    • Hypertension Sister    • Arthritis Brother    • Diabetes Brother    • Hyperlipidemia Brother    • Hypertension Brother    • Diabetes Maternal Grandmother    • Hyperlipidemia Maternal Grandmother    • Hypertension Maternal Grandmother    • Diabetes Maternal Grandfather    • Hyperlipidemia Maternal Grandfather    • Hypertension Maternal Grandfather    • Diabetes Paternal Grandmother    • Hyperlipidemia Paternal Grandmother    • Hypertension Paternal Grandmother    • Diabetes Paternal Grandfather    • Hyperlipidemia Paternal Grandfather    • Hypertension Paternal Grandfather              Physical Exam:      Physical Exam        Procedure:      Procedure: Cystoscopy Female    Indication: Diverticulitis rule out fistula      Urinalysis Performed Today:  Negative for Infection    Premedication:none    Informed Consent Obtained    Sterile prep performed in usual fashion    6 cc of topical lidocaine inserted urethrally    Flexible cystoscope inserted and bladder examined    Findings: Pt's mucosa was normal with normal orifices.  No tumors or stones or fistula.  The bladder did look pressed on by probable colon and uterus.  Vaginoscopy was performed and I did not see any fistula's in that location    Additional Procedure with Cystoscopy: none    Discussion:      If pt is going to have colon surgery I would definitely want to place bilateral stents to help the surgeon identify the ureters  Counseling was given to patient for the following topics diagnostic results. and the interim medical history and current results were reviewed.  A treatment plan with follow-up was made. Total time of the encounter was 21 minutes and 21 minutes were spent discussing No primary diagnosis found. face-to-face.      This document has been electronically signed by Vince Sotelo MD June 20, 2017 9:23 AM

## 2017-06-21 ENCOUNTER — OFFICE VISIT (OUTPATIENT)
Dept: FAMILY MEDICINE CLINIC | Facility: CLINIC | Age: 47
End: 2017-06-21

## 2017-06-21 VITALS
HEIGHT: 64 IN | HEART RATE: 87 BPM | TEMPERATURE: 96.8 F | DIASTOLIC BLOOD PRESSURE: 60 MMHG | WEIGHT: 207 LBS | SYSTOLIC BLOOD PRESSURE: 118 MMHG | OXYGEN SATURATION: 97 % | BODY MASS INDEX: 35.34 KG/M2

## 2017-06-21 DIAGNOSIS — Z72.0 TOBACCO USE: ICD-10-CM

## 2017-06-21 DIAGNOSIS — D51.8 OTHER VITAMIN B12 DEFICIENCY ANEMIA: Primary | ICD-10-CM

## 2017-06-21 DIAGNOSIS — E11.51 CONTROLLED TYPE 2 DM WITH PERIPHERAL CIRCULATORY DISORDER (HCC): ICD-10-CM

## 2017-06-21 DIAGNOSIS — M15.9 PRIMARY OSTEOARTHRITIS INVOLVING MULTIPLE JOINTS: ICD-10-CM

## 2017-06-21 DIAGNOSIS — M51.26 HNP (HERNIATED NUCLEUS PULPOSUS), LUMBAR: ICD-10-CM

## 2017-06-21 DIAGNOSIS — I10 ESSENTIAL HYPERTENSION: ICD-10-CM

## 2017-06-21 PROCEDURE — 99214 OFFICE O/P EST MOD 30 MIN: CPT | Performed by: NURSE PRACTITIONER

## 2017-06-21 RX ORDER — LEVOCETIRIZINE DIHYDROCHLORIDE 5 MG/1
TABLET, FILM COATED ORAL
Qty: 30 TABLET | Refills: 5 | Status: SHIPPED | OUTPATIENT
Start: 2017-06-21 | End: 2017-12-20 | Stop reason: SDUPTHER

## 2017-06-21 RX ORDER — NIZATIDINE 150 MG/1
CAPSULE ORAL
Qty: 60 CAPSULE | Refills: 5 | Status: SHIPPED | OUTPATIENT
Start: 2017-06-21 | End: 2017-12-20 | Stop reason: SDUPTHER

## 2017-06-21 RX ORDER — LISINOPRIL 20 MG/1
TABLET ORAL
Qty: 30 TABLET | Refills: 5 | Status: SHIPPED | OUTPATIENT
Start: 2017-06-21 | End: 2017-10-17

## 2017-06-21 RX ORDER — ESOMEPRAZOLE MAGNESIUM 40 MG/1
CAPSULE, DELAYED RELEASE ORAL
Qty: 30 CAPSULE | Refills: 5 | Status: SHIPPED | OUTPATIENT
Start: 2017-06-21 | End: 2017-12-20 | Stop reason: SDUPTHER

## 2017-06-21 RX ORDER — HYDROCODONE BITARTRATE AND ACETAMINOPHEN 10; 325 MG/1; MG/1
1 TABLET ORAL EVERY 6 HOURS PRN
Qty: 120 TABLET | Refills: 0 | Status: SHIPPED | OUTPATIENT
Start: 2017-06-21 | End: 2017-07-24 | Stop reason: SDUPTHER

## 2017-06-21 RX ORDER — DULOXETIN HYDROCHLORIDE 60 MG/1
CAPSULE, DELAYED RELEASE ORAL
Qty: 30 CAPSULE | Refills: 3 | Status: SHIPPED | OUTPATIENT
Start: 2017-06-21 | End: 2017-10-25 | Stop reason: SDUPTHER

## 2017-06-21 RX ORDER — DICLOFENAC SODIUM 75 MG/1
TABLET, DELAYED RELEASE ORAL
Qty: 60 TABLET | Refills: 5 | Status: SHIPPED | OUTPATIENT
Start: 2017-06-21 | End: 2017-12-20 | Stop reason: SDUPTHER

## 2017-06-21 RX ORDER — METHOCARBAMOL 750 MG/1
TABLET, FILM COATED ORAL
Qty: 60 TABLET | Refills: 5 | Status: SHIPPED | OUTPATIENT
Start: 2017-06-21 | End: 2017-12-01 | Stop reason: SDUPTHER

## 2017-06-21 NOTE — PROGRESS NOTES
Subjective   Mirian Fischer is a 47 y.o. female.     Chief Complaint   Patient presents with   • Pain   • discharge       History of Present Illness       Pt here today to follow up on chronic low back pain.   Currently under the care of urology and surgeon (Dr. Pruitt) for suspected colon fistula. Likely surgical intervention in the next 1-2 weeks.   Recent bariatric surgery for lap band with good weight loss.     Chronic low back pain  Rates her pain as 6 on psr today. She reports that her back pain is constant and never completely relieved. Pain interferes with ability to sleep, perform daily activities and care for her handicapped son. Taking the Norco as scheduled does reduce her pain allowing her to be up in her home and more mobile. She has increased pain with standing, walking and lifting. Radiology is on file. Multiple ortho and neurology consult notes are on file. Ms. Fischer has been compliant with treatment. She has no history of substance abuse or addiction.       The following portions of the patient's history were reviewed and updated as appropriate: allergies, current medications, past family history, past medical history, past social history, past surgical history and problem list.    Review of Systems   Constitutional: Positive for activity change, appetite change, fatigue and fever. Negative for unexpected weight change.   Eyes: Negative.    Respiratory: Negative for cough, chest tightness, shortness of breath and wheezing.    Cardiovascular: Negative for chest pain, palpitations and leg swelling.   Gastrointestinal: Positive for abdominal pain, constipation and nausea. Negative for blood in stool, diarrhea, rectal pain and vomiting.   Endocrine: Negative.    Genitourinary: Positive for difficulty urinating, dysuria, pelvic pain, urgency and vaginal discharge.        Discharge with fecal content and frequent UTI    Musculoskeletal: Positive for arthralgias, back pain, gait problem, myalgias and  "neck pain. Negative for neck stiffness.   Skin: Negative for rash.   Allergic/Immunologic: Negative.    Neurological: Positive for weakness and headaches (random ). Negative for seizures and speech difficulty.   Hematological: Negative.    Psychiatric/Behavioral: Positive for sleep disturbance (due to pain ). Negative for suicidal ideas.       Objective     /60 (BP Location: Left arm, Patient Position: Sitting, Cuff Size: Adult)  Pulse 87  Temp 96.8 °F (36 °C) (Tympanic)   Ht 64\" (162.6 cm)  Wt 207 lb (93.9 kg)  SpO2 97%  BMI 35.53 kg/m2      Physical Exam   Constitutional: She is oriented to person, place, and time. Vital signs are normal. She appears well-developed and well-nourished. She has a sickly appearance. No distress.   HENT:   Head: Normocephalic and atraumatic.   Right Ear: External ear normal.   Left Ear: External ear normal.   Nose: Nose normal.   Mouth/Throat: Oropharynx is clear and moist.   Eyes: EOM are normal. Pupils are equal, round, and reactive to light.   Neck: Normal range of motion. Neck supple.   Cardiovascular: Normal rate, regular rhythm, normal heart sounds and intact distal pulses.    No murmur heard.  Pulmonary/Chest: Effort normal and breath sounds normal. No respiratory distress. She has no wheezes. She has no rales. She exhibits no tenderness.   Abdominal: Soft. Normal appearance and bowel sounds are normal. She exhibits no distension and no mass. There is no hepatosplenomegaly. There is tenderness in the suprapubic area. There is no rebound, no guarding, no tenderness at McBurney's point and negative Chambers's sign.   Musculoskeletal:        Lumbar back: She exhibits decreased range of motion, tenderness, pain and spasm.   Decreased lumbar curvature, there is pain with forward flexion. DTR's +2. No edema.    Neurological: She is alert and oriented to person, place, and time. She has normal reflexes.   Skin: Skin is warm and dry. No rash noted. No erythema. No pallor. "   Psychiatric: She has a normal mood and affect. Her speech is normal and behavior is normal. Judgment and thought content normal. Her affect is not inappropriate. Cognition and memory are normal.   Denies thoughts of hurting self or others.   Nursing note and vitals reviewed.      Assessment/Plan     Problem List Items Addressed This Visit        Cardiovascular and Mediastinum    Controlled type 2 DM with peripheral circulatory disorder    Hypertension       Digestive    Anemia due to vitamin B12 deficiency - Primary       Musculoskeletal and Integument    Osteoarthritis, multiple sites    Relevant Medications    HYDROcodone-acetaminophen (NORCO)  MG per tablet    HNP (herniated nucleus pulposus), lumbar       Other    Tobacco use        Monthly B 12 injections  Stable hypertension  Pt has been educated/instructed on diabetic care and protocols. Sick day rules reviewed. Continue to monitor blood sugar and report abnormal readings as discussed today. Pt / family state understanding.   Body mechanics reviewed. Avoid lifting. Continue Norco for pain reduction. Goal: improved quality of life and decreased pain as evidenced by pt report.   Encouraged smoke cessation.   Urology and surgery consult notes reviewed and discussed . Allowed time to answer questions regarding upcoming surgical procedure and provide emotional support.  Marques has been reviewed as consistent.  Uds is on file.   Follow up in one month, sooner if needed.                This document has been electronically signed by:  CHARMAINE Doran, NP-C

## 2017-07-05 ENCOUNTER — OFFICE VISIT (OUTPATIENT)
Dept: SURGERY | Facility: CLINIC | Age: 47
End: 2017-07-05

## 2017-07-05 VITALS — BODY MASS INDEX: 35.34 KG/M2 | HEIGHT: 64 IN | WEIGHT: 207 LBS

## 2017-07-05 DIAGNOSIS — K57.20 DIVERTICULITIS OF LARGE INTESTINE WITH PERFORATION AND ABSCESS WITHOUT BLEEDING: Primary | ICD-10-CM

## 2017-07-05 DIAGNOSIS — K57.20 DIVERTICULITIS OF LARGE INTESTINE WITH PERFORATION WITHOUT BLEEDING: ICD-10-CM

## 2017-07-05 DIAGNOSIS — N82.4 COLOVAGINAL FISTULA: ICD-10-CM

## 2017-07-05 PROCEDURE — 99214 OFFICE O/P EST MOD 30 MIN: CPT | Performed by: SURGERY

## 2017-07-05 RX ORDER — SODIUM CHLORIDE 0.9 % (FLUSH) 0.9 %
1-10 SYRINGE (ML) INJECTION AS NEEDED
Status: CANCELLED | OUTPATIENT
Start: 2017-07-05

## 2017-07-05 NOTE — PROGRESS NOTES
Subjective   Mirian Fischer is a 47 y.o. female is here today for follow-up.    HPI Comments: 47-year-old female who earlier this year had an episode of, located diverticulitis with development of pelvic abscess.  This spontaneously drained transvaginally.  She responded well to antibiotic therapy but continues to develop accumulation of purulence with intermittent vaginal drainage.  No evidence of fistula tract is identified on vaginal examination, cystoscopy, or colonoscopy.  No malignancy identified either.  The patient denies fevers systemic symptoms.  She is had previous  sections.  No further episodes of diverticulitis of been encountered.      Past Medical History:   Diagnosis Date   • Allergic rhinitis    • Anxiety disorder    • Cellulitis and abscess     labia and groin   • Conjunctival folliculosis    • Constipation    • Controlled type 2 DM with peripheral circulatory disorder    • Degenerative disc disease at L5-S1 level    • Dermatitis due to drug    • Flushing    • Herniated cervical disc    • Hypertension    • Hypertension    • Impacted cerumen    • Lumbago    • Malaise and fatigue    • Nausea    • Osteoarthritis, multiple sites    • Sleep apnea    • Swelling, limb    • Tobacco use    • Vitamin D deficiency        Family History   Problem Relation Age of Onset   • Arthritis Mother    • Asthma Mother    • Cancer Mother    • COPD Mother    • Depression Mother    • Diabetes Mother    • Heart disease Mother    • Hyperlipidemia Mother    • Hypertension Mother    • Arthritis Father    • Asthma Father    • COPD Father    • Diabetes Father    • Hyperlipidemia Father    • Hypertension Father    • Arthritis Sister    • Diabetes Sister    • Hyperlipidemia Sister    • Hypertension Sister    • Arthritis Brother    • Diabetes Brother    • Hyperlipidemia Brother    • Hypertension Brother    • Diabetes Maternal Grandmother    • Hyperlipidemia Maternal Grandmother    • Hypertension Maternal Grandmother    •  Diabetes Maternal Grandfather    • Hyperlipidemia Maternal Grandfather    • Hypertension Maternal Grandfather    • Diabetes Paternal Grandmother    • Hyperlipidemia Paternal Grandmother    • Hypertension Paternal Grandmother    • Diabetes Paternal Grandfather    • Hyperlipidemia Paternal Grandfather    • Hypertension Paternal Grandfather        Social History     Social History   • Marital status:      Spouse name: N/A   • Number of children: N/A   • Years of education: N/A     Occupational History   • Not on file.     Social History Main Topics   • Smoking status: Current Every Day Smoker     Packs/day: 1.00     Types: Cigarettes   • Smokeless tobacco: Never Used   • Alcohol use No   • Drug use: No   • Sexual activity: No     Other Topics Concern   • Not on file     Social History Narrative       Past Surgical History:   Procedure Laterality Date   •  SECTION     • CHOLECYSTECTOMY     • COLONOSCOPY N/A 2016    Procedure: COLONOSCOPY;  Surgeon: Bakari Pruitt MD;  Location: [facility];  Service:    • FINGER REIMPLANTATION     • LAPAROSCOPIC GASTRIC BANDING     • TYMPANOPLASTY     • WISDOM TOOTH EXTRACTION         The following portions of the patient's history were reviewed and updated as appropriate: allergies, current medications, past family history, past medical history, past social history, past surgical history and problem list.    Review of Systems   Constitutional: Negative for activity change, appetite change, chills and fever.   HENT: Negative for sore throat and trouble swallowing.    Eyes: Negative for visual disturbance.   Respiratory: Negative for cough and shortness of breath.    Cardiovascular: Negative for chest pain and palpitations.   Gastrointestinal: Positive for abdominal pain. Negative for abdominal distention, blood in stool, constipation, diarrhea, nausea and vomiting.   Endocrine: Negative for cold intolerance and heat intolerance.   Genitourinary: Positive for  "vaginal discharge. Negative for dysuria.   Musculoskeletal: Negative for joint swelling.   Skin: Negative for color change, rash and wound.   Allergic/Immunologic: Negative for immunocompromised state.   Neurological: Negative for dizziness, seizures, weakness and headaches.   Hematological: Negative for adenopathy. Does not bruise/bleed easily.   Psychiatric/Behavioral: Negative for agitation and confusion.         Ht 64\" (162.6 cm)  Wt 207 lb (93.9 kg)  BMI 35.53 kg/m2  Objective   Physical Exam   Constitutional: She is oriented to person, place, and time. She appears well-developed.   HENT:   Head: Normocephalic and atraumatic.   Mouth/Throat: Mucous membranes are normal.   Eyes: Conjunctivae are normal. Pupils are equal, round, and reactive to light.   Neck: Neck supple. No JVD present. No tracheal deviation present. No thyromegaly present.   Cardiovascular: Normal rate and regular rhythm.  Exam reveals no gallop and no friction rub.    No murmur heard.  Pulmonary/Chest: Effort normal and breath sounds normal.   Abdominal: Soft. She exhibits no distension. There is no splenomegaly or hepatomegaly. There is no tenderness. No hernia.   Musculoskeletal: Normal range of motion. She exhibits no deformity.   Neurological: She is alert and oriented to person, place, and time.   Skin: Skin is warm and dry.   Psychiatric: She has a normal mood and affect.         Mirian was seen today for schedule sigmoidoscopy.    Diagnoses and all orders for this visit:    Diverticulitis of large intestine with perforation and abscess without bleeding  -     Case Request; Standing  -     CBC and Differential; Future  -     Comprehensive metabolic panel; Future  -     sodium chloride 0.9 % flush 1-10 mL; Infuse 1-10 mL into a venous catheter As Needed for Line Care.  -     ampicillin-sulbactam 3 g/100 mL 0.9% NS (MBP); Infuse 100 mL into a venous catheter 1 (One) Time.  -     Case Request    Colovaginal fistula  -     Case Request; " Standing  -     CBC and Differential; Future  -     Comprehensive metabolic panel; Future  -     sodium chloride 0.9 % flush 1-10 mL; Infuse 1-10 mL into a venous catheter As Needed for Line Care.  -     ampicillin-sulbactam 3 g/100 mL 0.9% NS (MBP); Infuse 100 mL into a venous catheter 1 (One) Time.  -     Case Request    Diverticulitis of large intestine with perforation without bleeding    Other orders  -     Provide instructions to patient on NPO status  -     Clorhexidine skin prep  -     Follow Anesthesia Guidelines / Standing Orders; Standing  -     Verify NPO Status; Standing  -     Obtain informed consent; Standing  -     SCD (sequential compression device)- to be placed on patient in Pre-op; Standing  -     Instructions on coughing, deep breathing, and incentive spirometry.; Standing  -     Insert Peripheral IV; Standing  -     Saline Lock & Maintain IV Access; Standing  -     Inpatient Admission; Standing        Assessment     47-year-old female with an episode of complicated diverticulitis with pelvic abscess that spontaneously drained through the vagina.  She has intermittent continued purulent drainage and would like to proceed with sigmoid colectomy.  She is undergone cystoscopy and colonoscopy with no evidence of malignancy.  After risks and benefits of been discussed with the patient to undergo laparoscopic possible open sigmoid colectomy with possible colostomy or ileostomy if indicated.  Given the drainage she may require colovaginal or colovesical fistula repair.

## 2017-07-10 ENCOUNTER — RESULTS ENCOUNTER (OUTPATIENT)
Dept: SURGERY | Facility: CLINIC | Age: 47
End: 2017-07-10

## 2017-07-10 DIAGNOSIS — K57.20 DIVERTICULITIS OF LARGE INTESTINE WITH PERFORATION AND ABSCESS WITHOUT BLEEDING: ICD-10-CM

## 2017-07-10 DIAGNOSIS — N82.4 COLOVAGINAL FISTULA: ICD-10-CM

## 2017-07-12 ENCOUNTER — APPOINTMENT (OUTPATIENT)
Dept: PREADMISSION TESTING | Facility: HOSPITAL | Age: 47
End: 2017-07-12

## 2017-07-12 VITALS — WEIGHT: 203 LBS | BODY MASS INDEX: 33.82 KG/M2 | HEIGHT: 65 IN

## 2017-07-12 DIAGNOSIS — Z01.818 PREOPERATIVE TESTING: ICD-10-CM

## 2017-07-12 DIAGNOSIS — Z48.89 POSTOPERATIVE OBSERVATION: Primary | ICD-10-CM

## 2017-07-12 LAB
ALBUMIN SERPL-MCNC: 4.1 G/DL (ref 3.5–5)
ALBUMIN/GLOB SERPL: 1.4 G/DL (ref 1.5–2.5)
ALP SERPL-CCNC: 80 U/L (ref 35–104)
ALT SERPL W P-5'-P-CCNC: 16 U/L (ref 10–36)
ANION GAP SERPL CALCULATED.3IONS-SCNC: 6.9 MMOL/L (ref 3.6–11.2)
AST SERPL-CCNC: 13 U/L (ref 10–30)
BASOPHILS # BLD AUTO: 0.14 10*3/MM3 (ref 0–0.3)
BASOPHILS NFR BLD AUTO: 1.4 % (ref 0–2)
BILIRUB SERPL-MCNC: 0.2 MG/DL (ref 0.2–1.8)
BUN BLD-MCNC: 5 MG/DL (ref 7–21)
BUN/CREAT SERPL: 11.4 (ref 7–25)
CALCIUM SPEC-SCNC: 9.5 MG/DL (ref 7.7–10)
CHLORIDE SERPL-SCNC: 104 MMOL/L (ref 99–112)
CO2 SERPL-SCNC: 27.1 MMOL/L (ref 24.3–31.9)
CREAT BLD-MCNC: 0.44 MG/DL (ref 0.43–1.29)
DEPRECATED RDW RBC AUTO: 51.4 FL (ref 37–54)
EOSINOPHIL # BLD AUTO: 0.51 10*3/MM3 (ref 0–0.7)
EOSINOPHIL NFR BLD AUTO: 5.2 % (ref 0–5)
ERYTHROCYTE [DISTWIDTH] IN BLOOD BY AUTOMATED COUNT: 17.9 % (ref 11.5–14.5)
GFR SERPL CREATININE-BSD FRML MDRD: >150 ML/MIN/1.73
GLOBULIN UR ELPH-MCNC: 2.9 GM/DL
GLUCOSE BLD-MCNC: 107 MG/DL (ref 70–110)
HCT VFR BLD AUTO: 41.8 % (ref 37–47)
HGB BLD-MCNC: 13.3 G/DL (ref 12–16)
IMM GRANULOCYTES # BLD: 0.03 10*3/MM3 (ref 0–0.03)
IMM GRANULOCYTES NFR BLD: 0.3 % (ref 0–0.5)
LYMPHOCYTES # BLD AUTO: 2.36 10*3/MM3 (ref 1–3)
LYMPHOCYTES NFR BLD AUTO: 24.2 % (ref 21–51)
MCH RBC QN AUTO: 25.3 PG (ref 27–33)
MCHC RBC AUTO-ENTMCNC: 31.8 G/DL (ref 33–37)
MCV RBC AUTO: 79.5 FL (ref 80–94)
MONOCYTES # BLD AUTO: 0.88 10*3/MM3 (ref 0.1–0.9)
MONOCYTES NFR BLD AUTO: 9 % (ref 0–10)
NEUTROPHILS # BLD AUTO: 5.85 10*3/MM3 (ref 1.4–6.5)
NEUTROPHILS NFR BLD AUTO: 59.9 % (ref 30–70)
OSMOLALITY SERPL CALC.SUM OF ELEC: 273.4 MOSM/KG (ref 273–305)
PLATELET # BLD AUTO: 352 10*3/MM3 (ref 130–400)
PMV BLD AUTO: 9.9 FL (ref 6–10)
POTASSIUM BLD-SCNC: 3.7 MMOL/L (ref 3.5–5.3)
PROT SERPL-MCNC: 7 G/DL (ref 6–8)
RBC # BLD AUTO: 5.26 10*6/MM3 (ref 4.2–5.4)
SODIUM BLD-SCNC: 138 MMOL/L (ref 135–153)
WBC NRBC COR # BLD: 9.77 10*3/MM3 (ref 4.5–12.5)

## 2017-07-12 RX ORDER — METRONIDAZOLE 500 MG/1
500 TABLET ORAL 3 TIMES DAILY
Status: ON HOLD | COMMUNITY
Start: 2017-07-12 | End: 2017-07-14

## 2017-07-12 RX ORDER — BISACODYL 5 MG/1
20 TABLET, DELAYED RELEASE ORAL DAILY
COMMUNITY
Start: 2017-07-12 | End: 2017-07-12

## 2017-07-12 RX ORDER — CYANOCOBALAMIN 1000 UG/ML
1000 INJECTION, SOLUTION INTRAMUSCULAR; SUBCUTANEOUS
Status: ON HOLD | COMMUNITY
End: 2017-07-14 | Stop reason: SDUPTHER

## 2017-07-12 RX ORDER — POLYETHYLENE GLYCOL 3350 17 G/17G
17 POWDER, FOR SOLUTION ORAL
COMMUNITY
Start: 2017-07-12 | End: 2017-07-12

## 2017-07-12 RX ORDER — NEOMYCIN SULFATE 500 MG/1
1000 TABLET ORAL 3 TIMES DAILY
COMMUNITY
Start: 2017-07-12 | End: 2017-07-14 | Stop reason: HOSPADM

## 2017-07-12 RX ORDER — ONDANSETRON 4 MG/1
4 TABLET, FILM COATED ORAL EVERY 6 HOURS PRN
Status: ON HOLD | COMMUNITY
End: 2019-03-28

## 2017-07-14 ENCOUNTER — ANESTHESIA EVENT (OUTPATIENT)
Dept: PERIOP | Facility: HOSPITAL | Age: 47
End: 2017-07-14

## 2017-07-14 ENCOUNTER — TELEPHONE (OUTPATIENT)
Dept: FAMILY MEDICINE CLINIC | Facility: CLINIC | Age: 47
End: 2017-07-14

## 2017-07-14 ENCOUNTER — HOSPITAL ENCOUNTER (OUTPATIENT)
Facility: HOSPITAL | Age: 47
Setting detail: HOSPITAL OUTPATIENT SURGERY
Discharge: HOME | End: 2017-07-14
Attending: SURGERY | Admitting: SURGERY

## 2017-07-14 ENCOUNTER — ANESTHESIA (OUTPATIENT)
Dept: PERIOP | Facility: HOSPITAL | Age: 47
End: 2017-07-14

## 2017-07-14 VITALS
HEIGHT: 65 IN | BODY MASS INDEX: 33.82 KG/M2 | WEIGHT: 203 LBS | SYSTOLIC BLOOD PRESSURE: 118 MMHG | RESPIRATION RATE: 18 BRPM | DIASTOLIC BLOOD PRESSURE: 62 MMHG | TEMPERATURE: 97.8 F | OXYGEN SATURATION: 98 % | HEART RATE: 86 BPM

## 2017-07-14 DIAGNOSIS — K57.20 DIVERTICULITIS OF LARGE INTESTINE WITH PERFORATION AND ABSCESS WITHOUT BLEEDING: ICD-10-CM

## 2017-07-14 DIAGNOSIS — N82.4 COLOVAGINAL FISTULA: ICD-10-CM

## 2017-07-14 DIAGNOSIS — Z01.818 PREOPERATIVE TESTING: ICD-10-CM

## 2017-07-14 LAB
ABO GROUP BLD: NORMAL
B-HCG UR QL: NEGATIVE
BLD GP AB SCN SERPL QL: NEGATIVE
GLUCOSE BLDC GLUCOMTR-MCNC: 96 MG/DL (ref 70–130)
INTERNAL NEGATIVE CONTROL: NEGATIVE
INTERNAL POSITIVE CONTROL: POSITIVE
Lab: NORMAL
RH BLD: POSITIVE

## 2017-07-14 PROCEDURE — 45990 SURG DX EXAM ANORECTAL: CPT | Performed by: SURGERY

## 2017-07-14 PROCEDURE — C1769 GUIDE WIRE: HCPCS | Performed by: SURGERY

## 2017-07-14 PROCEDURE — 25010000002 ONDANSETRON PER 1 MG: Performed by: NURSE ANESTHETIST, CERTIFIED REGISTERED

## 2017-07-14 PROCEDURE — 25010000002 FENTANYL CITRATE (PF) 100 MCG/2ML SOLUTION: Performed by: NURSE ANESTHETIST, CERTIFIED REGISTERED

## 2017-07-14 PROCEDURE — 86901 BLOOD TYPING SEROLOGIC RH(D): CPT | Performed by: SURGERY

## 2017-07-14 PROCEDURE — 82962 GLUCOSE BLOOD TEST: CPT

## 2017-07-14 PROCEDURE — 25010000002 NEOSTIGMINE 10 MG/10ML SOLUTION: Performed by: NURSE ANESTHETIST, CERTIFIED REGISTERED

## 2017-07-14 PROCEDURE — 25010000002 MIDAZOLAM PER 1 MG: Performed by: NURSE ANESTHETIST, CERTIFIED REGISTERED

## 2017-07-14 PROCEDURE — 86900 BLOOD TYPING SEROLOGIC ABO: CPT | Performed by: SURGERY

## 2017-07-14 PROCEDURE — 25010000002 HEPARIN (PORCINE) PER 1000 UNITS: Performed by: SURGERY

## 2017-07-14 PROCEDURE — 86850 RBC ANTIBODY SCREEN: CPT | Performed by: SURGERY

## 2017-07-14 PROCEDURE — 25010000002 DEXAMETHASONE PER 1 MG: Performed by: NURSE ANESTHETIST, CERTIFIED REGISTERED

## 2017-07-14 PROCEDURE — 25010000002 PROPOFOL 10 MG/ML EMULSION: Performed by: NURSE ANESTHETIST, CERTIFIED REGISTERED

## 2017-07-14 RX ORDER — DEXAMETHASONE SODIUM PHOSPHATE 10 MG/ML
INJECTION INTRAMUSCULAR; INTRAVENOUS AS NEEDED
Status: DISCONTINUED | OUTPATIENT
Start: 2017-07-14 | End: 2017-07-14 | Stop reason: SURG

## 2017-07-14 RX ORDER — HEPARIN SODIUM 5000 [USP'U]/ML
5000 INJECTION, SOLUTION INTRAVENOUS; SUBCUTANEOUS ONCE
Status: COMPLETED | OUTPATIENT
Start: 2017-07-14 | End: 2017-07-14

## 2017-07-14 RX ORDER — MIDAZOLAM HYDROCHLORIDE 1 MG/ML
INJECTION INTRAMUSCULAR; INTRAVENOUS AS NEEDED
Status: DISCONTINUED | OUTPATIENT
Start: 2017-07-14 | End: 2017-07-14 | Stop reason: SURG

## 2017-07-14 RX ORDER — METRONIDAZOLE 500 MG/1
500 TABLET ORAL 3 TIMES DAILY
Qty: 30 TABLET | Refills: 0 | Status: SHIPPED | OUTPATIENT
Start: 2017-07-14 | End: 2017-07-24

## 2017-07-14 RX ORDER — LIDOCAINE HYDROCHLORIDE 20 MG/ML
INJECTION, SOLUTION INFILTRATION; PERINEURAL AS NEEDED
Status: DISCONTINUED | OUTPATIENT
Start: 2017-07-14 | End: 2017-07-14 | Stop reason: SURG

## 2017-07-14 RX ORDER — ALVIMOPAN 12 MG/1
12 CAPSULE ORAL ONCE
Status: COMPLETED | OUTPATIENT
Start: 2017-07-14 | End: 2017-07-14

## 2017-07-14 RX ORDER — NEOSTIGMINE METHYLSULFATE 1 MG/ML
INJECTION, SOLUTION INTRAVENOUS AS NEEDED
Status: DISCONTINUED | OUTPATIENT
Start: 2017-07-14 | End: 2017-07-14 | Stop reason: SURG

## 2017-07-14 RX ORDER — ONDANSETRON 2 MG/ML
4 INJECTION INTRAMUSCULAR; INTRAVENOUS ONCE AS NEEDED
Status: DISCONTINUED | OUTPATIENT
Start: 2017-07-14 | End: 2017-07-14 | Stop reason: HOSPADM

## 2017-07-14 RX ORDER — ROCURONIUM BROMIDE 10 MG/ML
INJECTION, SOLUTION INTRAVENOUS AS NEEDED
Status: DISCONTINUED | OUTPATIENT
Start: 2017-07-14 | End: 2017-07-14 | Stop reason: SURG

## 2017-07-14 RX ORDER — GLYCOPYRROLATE 0.2 MG/ML
INJECTION INTRAMUSCULAR; INTRAVENOUS AS NEEDED
Status: DISCONTINUED | OUTPATIENT
Start: 2017-07-14 | End: 2017-07-14 | Stop reason: SURG

## 2017-07-14 RX ORDER — SODIUM CHLORIDE 0.9 % (FLUSH) 0.9 %
1-10 SYRINGE (ML) INJECTION AS NEEDED
Status: DISCONTINUED | OUTPATIENT
Start: 2017-07-14 | End: 2017-07-14 | Stop reason: HOSPADM

## 2017-07-14 RX ORDER — FENTANYL CITRATE 50 UG/ML
INJECTION, SOLUTION INTRAMUSCULAR; INTRAVENOUS AS NEEDED
Status: DISCONTINUED | OUTPATIENT
Start: 2017-07-14 | End: 2017-07-14 | Stop reason: SURG

## 2017-07-14 RX ORDER — FAMOTIDINE 10 MG/ML
INJECTION, SOLUTION INTRAVENOUS AS NEEDED
Status: DISCONTINUED | OUTPATIENT
Start: 2017-07-14 | End: 2017-07-14 | Stop reason: SURG

## 2017-07-14 RX ORDER — OXYCODONE HYDROCHLORIDE AND ACETAMINOPHEN 5; 325 MG/1; MG/1
1 TABLET ORAL ONCE AS NEEDED
Status: DISCONTINUED | OUTPATIENT
Start: 2017-07-14 | End: 2017-07-14 | Stop reason: HOSPADM

## 2017-07-14 RX ORDER — MEPERIDINE HYDROCHLORIDE 25 MG/ML
12.5 INJECTION INTRAMUSCULAR; INTRAVENOUS; SUBCUTANEOUS
Status: DISCONTINUED | OUTPATIENT
Start: 2017-07-14 | End: 2017-07-14 | Stop reason: HOSPADM

## 2017-07-14 RX ORDER — CIPROFLOXACIN 500 MG/1
500 TABLET, FILM COATED ORAL 2 TIMES DAILY
Qty: 20 TABLET | Refills: 0 | Status: SHIPPED | OUTPATIENT
Start: 2017-07-14 | End: 2017-07-24

## 2017-07-14 RX ORDER — SODIUM CHLORIDE, SODIUM LACTATE, POTASSIUM CHLORIDE, CALCIUM CHLORIDE 600; 310; 30; 20 MG/100ML; MG/100ML; MG/100ML; MG/100ML
125 INJECTION, SOLUTION INTRAVENOUS CONTINUOUS
Status: DISCONTINUED | OUTPATIENT
Start: 2017-07-14 | End: 2017-07-14 | Stop reason: HOSPADM

## 2017-07-14 RX ORDER — IPRATROPIUM BROMIDE AND ALBUTEROL SULFATE 2.5; .5 MG/3ML; MG/3ML
3 SOLUTION RESPIRATORY (INHALATION) ONCE AS NEEDED
Status: DISCONTINUED | OUTPATIENT
Start: 2017-07-14 | End: 2017-07-14 | Stop reason: HOSPADM

## 2017-07-14 RX ORDER — FENTANYL CITRATE 50 UG/ML
50 INJECTION, SOLUTION INTRAMUSCULAR; INTRAVENOUS
Status: DISCONTINUED | OUTPATIENT
Start: 2017-07-14 | End: 2017-07-14 | Stop reason: HOSPADM

## 2017-07-14 RX ORDER — PROPOFOL 10 MG/ML
VIAL (ML) INTRAVENOUS AS NEEDED
Status: DISCONTINUED | OUTPATIENT
Start: 2017-07-14 | End: 2017-07-14 | Stop reason: SURG

## 2017-07-14 RX ORDER — ONDANSETRON 2 MG/ML
INJECTION INTRAMUSCULAR; INTRAVENOUS AS NEEDED
Status: DISCONTINUED | OUTPATIENT
Start: 2017-07-14 | End: 2017-07-14 | Stop reason: SURG

## 2017-07-14 RX ADMIN — ROCURONIUM BROMIDE 40 MG: 10 INJECTION INTRAVENOUS at 11:28

## 2017-07-14 RX ADMIN — PROPOFOL 150 MG: 10 INJECTION, EMULSION INTRAVENOUS at 11:28

## 2017-07-14 RX ADMIN — DEXAMETHASONE SODIUM PHOSPHATE 4 MG: 10 INJECTION INTRAMUSCULAR; INTRAVENOUS at 11:28

## 2017-07-14 RX ADMIN — AMPICILLIN SODIUM AND SULBACTAM SODIUM 3 G: 2; 1 INJECTION, POWDER, FOR SOLUTION INTRAMUSCULAR; INTRAVENOUS at 11:25

## 2017-07-14 RX ADMIN — FENTANYL CITRATE 100 MCG: 50 INJECTION INTRAMUSCULAR; INTRAVENOUS at 11:25

## 2017-07-14 RX ADMIN — LIDOCAINE HYDROCHLORIDE 60 MG: 20 INJECTION, SOLUTION INFILTRATION; PERINEURAL at 11:28

## 2017-07-14 RX ADMIN — FAMOTIDINE 20 MG: 10 INJECTION, SOLUTION INTRAVENOUS at 11:28

## 2017-07-14 RX ADMIN — ALVIMOPAN 12 MG: 12 CAPSULE ORAL at 11:11

## 2017-07-14 RX ADMIN — NEOSTIGMINE METHYLSULFATE 5 MG: 1 INJECTION, SOLUTION INTRAVENOUS at 11:50

## 2017-07-14 RX ADMIN — MIDAZOLAM HYDROCHLORIDE 2 MG: 1 INJECTION, SOLUTION INTRAMUSCULAR; INTRAVENOUS at 11:25

## 2017-07-14 RX ADMIN — ONDANSETRON 4 MG: 2 INJECTION, SOLUTION INTRAMUSCULAR; INTRAVENOUS at 11:28

## 2017-07-14 RX ADMIN — HEPARIN SODIUM 5000 UNITS: 5000 INJECTION, SOLUTION INTRAVENOUS; SUBCUTANEOUS at 11:12

## 2017-07-14 RX ADMIN — GLYCOPYRROLATE 0.8 MG: 0.2 INJECTION, SOLUTION INTRAMUSCULAR; INTRAVENOUS at 11:50

## 2017-07-14 RX ADMIN — SODIUM CHLORIDE, POTASSIUM CHLORIDE, SODIUM LACTATE AND CALCIUM CHLORIDE 125 ML/HR: 600; 310; 30; 20 INJECTION, SOLUTION INTRAVENOUS at 11:06

## 2017-07-14 RX ADMIN — FENTANYL CITRATE 150 MCG: 50 INJECTION INTRAMUSCULAR; INTRAVENOUS at 11:28

## 2017-07-14 NOTE — ANESTHESIA PREPROCEDURE EVALUATION
Anesthesia Evaluation     Patient summary reviewed and Nursing notes reviewed   no history of anesthetic complications:  NPO Solid Status: > 8 hours  NPO Liquid Status: > 8 hours     Airway   Mallampati: II  TM distance: <3 FB  Neck ROM: full  possible difficult intubation and anterior  Dental - normal exam     Pulmonary - normal exam   (+) a smoker Current, COPD, sleep apnea on CPAP,   (-) asthma  Cardiovascular - normal exam  Exercise tolerance: good (4-7 METS)    NYHA Classification: II  Patient on routine beta blocker and Beta blocker given within 24 hours of surgery    (+) hypertension, dysrhythmias, PVD, hyperlipidemia  (-) past MI, angina, CHF      Neuro/Psych  (+) numbness, psychiatric history Depression,    (-) seizures, CVA  GI/Hepatic/Renal/Endo    (+) obesity,  GERD, diabetes mellitus,   (-) morbid obesity    Musculoskeletal     Abdominal  - normal exam    Bowel sounds: normal.   Substance History - negative use     OB/GYN negative ob/gyn ROS         Other   (+) arthritis                                     Anesthesia Plan    ASA 3     general     intravenous induction   Anesthetic plan and risks discussed with patient.  Use of blood products discussed with patient  Consented to blood products.

## 2017-07-14 NOTE — ANESTHESIA POSTPROCEDURE EVALUATION
Patient: Mirian Fischer    Procedure Summary     Date Anesthesia Start Anesthesia Stop Room / Location    07/14/17 1124 1202 [facility] / [facility]       Procedure Diagnosis Surgeon Provider    COLON RESECTION LAPAROSCOPIC SIGMOID OR LOW ANTERIOR  PROCEDURE CANCELED (N/A Abdomen); CYSTOSCOPY BILATERAL STENT INSERTION WITH   PROCEDURE CANCELED (Bilateral ) Colovaginal fistula; Diverticulitis of large intestine with perforation and abscess without bleeding  (Colovaginal fistula [N82.4]; Diverticulitis of large intestine with perforation and abscess without bleeding [K57.20]) Bakari Pruitt MD; MD Keyon Bragg MD          Anesthesia Type: general  Last vitals  /58 (07/14/17 1232)    Temp 97.1 °F (36.2 °C) (07/14/17 1203)    Pulse 80 (07/14/17 1232)   Resp 18 (07/14/17 1232)    SpO2 95 % (07/14/17 1232)      Post Anesthesia Care and Evaluation    Patient location during evaluation: PHASE II  Patient participation: complete - patient participated  Level of consciousness: awake and alert  Pain score: 1  Pain management: adequate  Airway patency: patent  Anesthetic complications: No anesthetic complications  PONV Status: controlled  Cardiovascular status: acceptable  Respiratory status: acceptable  Hydration status: acceptable

## 2017-07-14 NOTE — ANESTHESIA PROCEDURE NOTES
Airway  Urgency: elective    Date/Time: 7/14/2017 11:29 AM  Airway not difficult    General Information and Staff    Patient location during procedure: OR  Anesthesiologist: RUSSELL MCMAHON  CRNA: ELDER TEE    Indications and Patient Condition  Indications for airway management: airway protection    Preoxygenated: yes  MILS maintained throughout  Mask difficulty assessment: 0 - not attempted    Final Airway Details  Final airway type: endotracheal airway      Successful airway: ETT  Cuffed: yes   Successful intubation technique: direct laryngoscopy  Facilitating devices/methods: intubating stylet  Endotracheal tube insertion site: oral  Blade: Maria Victoria  Blade size: #3  ETT size: 7.0 mm  Cormack-Lehane Classification: grade I - full view of glottis  Placement verified by: chest auscultation, capnometry and palpation of cuff   Cuff volume (mL): 10  Measured from: lips  ETT to lips (cm): 21  Number of attempts at approach: 1    Additional Comments  Dentition and lips same as preop

## 2017-07-24 ENCOUNTER — OFFICE VISIT (OUTPATIENT)
Dept: FAMILY MEDICINE CLINIC | Facility: CLINIC | Age: 47
End: 2017-07-24

## 2017-07-24 VITALS
HEIGHT: 65 IN | OXYGEN SATURATION: 98 % | HEART RATE: 88 BPM | DIASTOLIC BLOOD PRESSURE: 70 MMHG | SYSTOLIC BLOOD PRESSURE: 116 MMHG | WEIGHT: 195 LBS | BODY MASS INDEX: 32.49 KG/M2 | TEMPERATURE: 98.2 F

## 2017-07-24 DIAGNOSIS — M51.36 BULGE OF LUMBAR DISC WITHOUT MYELOPATHY: Primary | ICD-10-CM

## 2017-07-24 DIAGNOSIS — M15.9 PRIMARY OSTEOARTHRITIS INVOLVING MULTIPLE JOINTS: ICD-10-CM

## 2017-07-24 DIAGNOSIS — E11.51 CONTROLLED TYPE 2 DM WITH PERIPHERAL CIRCULATORY DISORDER (HCC): ICD-10-CM

## 2017-07-24 DIAGNOSIS — I10 ESSENTIAL HYPERTENSION: ICD-10-CM

## 2017-07-24 PROCEDURE — 99214 OFFICE O/P EST MOD 30 MIN: CPT | Performed by: NURSE PRACTITIONER

## 2017-07-24 RX ORDER — HYDROCODONE BITARTRATE AND ACETAMINOPHEN 10; 325 MG/1; MG/1
1 TABLET ORAL EVERY 6 HOURS PRN
Qty: 120 TABLET | Refills: 0 | Status: SHIPPED | OUTPATIENT
Start: 2017-07-24 | End: 2017-08-24 | Stop reason: SDUPTHER

## 2017-07-24 NOTE — PROGRESS NOTES
Subjective   Mirian Fischer is a 47 y.o. female.     Chief Complaint   Patient presents with   • Follow-up   • b12 deficiency    • Diverticulitis       History of Present Illness    Patient was seen last week at the [facility] for a scheduled surgery.  Surgery was unable to be completed due to complication.  She was sent to  for consult and has colon surgery scheduled this week with Dr. Leong at  colorectal.  Patient has a colovaginal fistula.       Chronic back pain - patient has chronic back pain which is been present for many years.  She rates her pain as 4 on pain scale rating today while sitting.  The pain is increased with movement and lifting twisting or bending.  She has been seen by orthopedic and neurosurgical consult in the past.  Spinal surgery has been recommended in the past though other health conditions have prevent it this surgery.  Mirian reports that use of her pain medication decreases her pain and allows her to be more mobile in her home as well as care for her handicapped child who is dependent for all care.  Mirian has no history of substance abuse or addiction.     Marques and UDS are on file as consistent.    B12 deficiency - stable with monthly injections.    Hypertension - stable current medications.    Diabetes type 2 with peripheral circulatory disorder - diabetes is stable at this time.  Patient reports home readings within except for ranges.  She has had no recent episodes of hypoglycemia or hyperglycemia.        The following portions of the patient's history were reviewed and updated as appropriate: allergies, current medications, past family history, past medical history, past social history, past surgical history and problem list.    Review of Systems   Constitutional: Positive for appetite change (Decreased) and fatigue. Negative for chills, diaphoresis and fever.   HENT: Negative.    Respiratory: Negative for cough, choking and shortness of breath.   "  Cardiovascular: Negative for chest pain, palpitations and leg swelling.   Gastrointestinal: Positive for abdominal pain, diarrhea and nausea. Negative for blood in stool and vomiting.   Endocrine: Negative for polyuria.   Genitourinary: Positive for vaginal discharge (Colovaginal fistula). Negative for dysuria, flank pain and vaginal pain.   Musculoskeletal: Positive for arthralgias, back pain, myalgias and neck pain. Negative for neck stiffness.   Allergic/Immunologic: Negative for environmental allergies.   Neurological: Positive for weakness (Lower extremities). Negative for facial asymmetry and speech difficulty.   Hematological: Negative.    Psychiatric/Behavioral: Negative for agitation, behavioral problems, decreased concentration, dysphoric mood, self-injury and suicidal ideas.       Objective     /70 (BP Location: Left arm, Patient Position: Sitting, Cuff Size: Adult)  Pulse 88  Temp 98.2 °F (36.8 °C) (Oral)   Ht 65\" (165.1 cm)  Wt 195 lb (88.5 kg)  SpO2 98%  BMI 32.45 kg/m2      Physical Exam   Constitutional: She is oriented to person, place, and time. Vital signs are normal. She appears well-developed and well-nourished. She has a sickly appearance. No distress.   HENT:   Head: Atraumatic.   Mouth/Throat: No oropharyngeal exudate.   Eyes: Pupils are equal, round, and reactive to light.   Neck: Neck supple. No thyromegaly present.   Cardiovascular: Normal rate and regular rhythm.    Pulmonary/Chest: Effort normal and breath sounds normal. No respiratory distress.   Abdominal: Soft. Bowel sounds are normal. There is tenderness (Suprapubic). There is no guarding.   Musculoskeletal:        Cervical back: She exhibits tenderness and spasm.        Lumbar back: She exhibits decreased range of motion, tenderness and pain.   Lymphadenopathy:     She has no cervical adenopathy.   Neurological: She is alert and oriented to person, place, and time.   Skin: Skin is warm and dry. She is not diaphoretic. " There is pallor.   Psychiatric: She has a normal mood and affect. Her speech is normal and behavior is normal. Judgment and thought content normal. Cognition and memory are normal.   Vitals reviewed.      Assessment/Plan     Problem List Items Addressed This Visit        Cardiovascular and Mediastinum    Controlled type 2 DM with peripheral circulatory disorder    Hypertension       Musculoskeletal and Integument    Osteoarthritis, multiple sites    Relevant Medications    HYDROcodone-acetaminophen (NORCO)  MG per tablet    Bulge of lumbar disc without myelopathy - Primary        I have discussed diagnosis in detail today allowing time for questions and answers. Pt is aware of reasons to seek urgent or emergent medical care as well as reasons to return to the clinic for evaluation. Possible side effects, interactions and progression of symptoms discussed as well. Pt / family states understanding.   Emotional support and active listening provided.   Goal: Improved quality of life and reduction in pain as evidenced by pt report.   We have discussed her upcoming surgery with time provided to answer any questions patient by have.   Refill Norco at this time.  I have discussed with the Mirian that following surgery her surgeon will likely write her stronger pain medication.  We do have a controlled substance contract however during her treatment following the surgery she may take pain medication as prescribed by her surgeon instead of the Norco and she has been taking.  Mirian will follow back in one month or sooner if needed.  A urine drug screen and Marques are on file as consistent.           This document has been electronically signed by:  CHARMAINE Doran, NP-C

## 2017-08-24 ENCOUNTER — OFFICE VISIT (OUTPATIENT)
Dept: FAMILY MEDICINE CLINIC | Facility: CLINIC | Age: 47
End: 2017-08-24

## 2017-08-24 VITALS
TEMPERATURE: 96.9 F | HEIGHT: 65 IN | HEART RATE: 75 BPM | SYSTOLIC BLOOD PRESSURE: 110 MMHG | OXYGEN SATURATION: 97 % | BODY MASS INDEX: 30.82 KG/M2 | WEIGHT: 185 LBS | DIASTOLIC BLOOD PRESSURE: 70 MMHG

## 2017-08-24 DIAGNOSIS — Z98.84 HX OF LAPAROSCOPIC GASTRIC BANDING: ICD-10-CM

## 2017-08-24 DIAGNOSIS — M51.26 HNP (HERNIATED NUCLEUS PULPOSUS), LUMBAR: ICD-10-CM

## 2017-08-24 DIAGNOSIS — M51.36 BULGE OF LUMBAR DISC WITHOUT MYELOPATHY: ICD-10-CM

## 2017-08-24 DIAGNOSIS — L24.A9 DRAINAGE FROM WOUND: Primary | ICD-10-CM

## 2017-08-24 DIAGNOSIS — E11.51 CONTROLLED TYPE 2 DM WITH PERIPHERAL CIRCULATORY DISORDER (HCC): ICD-10-CM

## 2017-08-24 DIAGNOSIS — M15.9 PRIMARY OSTEOARTHRITIS INVOLVING MULTIPLE JOINTS: ICD-10-CM

## 2017-08-24 DIAGNOSIS — N82.4 COLOVAGINAL FISTULA: ICD-10-CM

## 2017-08-24 DIAGNOSIS — Z79.899 HIGH RISK MEDICATION USE: ICD-10-CM

## 2017-08-24 PROCEDURE — 99215 OFFICE O/P EST HI 40 MIN: CPT | Performed by: NURSE PRACTITIONER

## 2017-08-24 RX ORDER — HYDROCODONE BITARTRATE AND ACETAMINOPHEN 10; 325 MG/1; MG/1
1 TABLET ORAL EVERY 6 HOURS PRN
Qty: 120 TABLET | Refills: 0 | Status: SHIPPED | OUTPATIENT
Start: 2017-08-24 | End: 2017-08-24 | Stop reason: SDUPTHER

## 2017-08-24 RX ORDER — CEPHALEXIN 500 MG/1
500 CAPSULE ORAL 3 TIMES DAILY
Qty: 30 CAPSULE | Refills: 0 | Status: SHIPPED | OUTPATIENT
Start: 2017-08-24 | End: 2017-09-19

## 2017-08-24 RX ORDER — HYDROCODONE BITARTRATE AND ACETAMINOPHEN 10; 325 MG/1; MG/1
1 TABLET ORAL EVERY 6 HOURS PRN
Qty: 90 TABLET | Refills: 0 | Status: SHIPPED | OUTPATIENT
Start: 2017-08-24 | End: 2017-09-19 | Stop reason: SDUPTHER

## 2017-08-24 NOTE — PROGRESS NOTES
Subjective   Mirian Fischer is a 47 y.o. female.     Chief Complaint   Patient presents with   • Osteoarthritis   • wound drainage   • Other     hospital follow up post bowel resection   • Constipation       History of Present Illness     One month status post bowel resection at [facility] by Dr. Leong.  Patient states that she is having some drainage from her surgical incisions on her abdomen.  She has been for surgical follow-up.  She reports that incision sites just started draining within the last week.  She denies any fever.    History of colovaginal fistula with recent repair.    Degenerative disc disease/spinal stenosis with lumbar neurogenic claudication - patient rates her chronic pain as 3 on pain scale rating today.  She does report a decrease in her pain with recent weight loss.  She reports that her pain is located in her low back with radiation into her hips and legs.  Pain is chronic and has been present for greater than 5 years.  Mirian has been seen by neurology and is currently under the care of neurology.  Recent radiology including MRI results are on file.  She reports that her pain is increased by prolonged standing, lifting and general activity.  Her pain is also made worse by prolonged sitting.  Patient requires frequent changes in position.  She has attempted physical therapy in the past with no improvement.  Spinal surgery may be an option in the future once she has recovered from recent weight loss surgery and bowel resection.  Patient reports that she has no history of substance abuse or addiction.  She does feel her pain has decreased somewhat and she is ready to decrease the frequency of her chronic pain medication.  She reports improved mobility in quality of life with use of Norco.    Chronic constipation-Mirian reports that her bowels have been moving better until last week.  She did have a bowel movement this morning which was her first one in 4 days.  She is  "not drinking adequate fluid.  When she does drink she likes to drink soda and very little water. She is currently taking Linzess for constipation which is helpful usually.     Hypertension-patient reports improvement in high blood pressure with recent weight loss.     B-12 deficiency anemia - has been receiving monthly B12 injections.  Most recent labs are on file        The following portions of the patient's history were reviewed and updated as appropriate: allergies, current medications, past family history, past medical history, past social history, past surgical history and problem list.    Review of Systems   Constitutional: Positive for activity change, appetite change, chills and fatigue. Negative for diaphoresis, fever and unexpected weight change.   HENT: Negative.    Eyes: Negative.    Respiratory: Negative for cough, chest tightness, shortness of breath and wheezing.    Cardiovascular: Negative for chest pain, palpitations and leg swelling.   Gastrointestinal: Positive for constipation. Negative for abdominal pain, blood in stool, diarrhea, nausea and vomiting.   Endocrine: Negative.    Genitourinary: Negative for dysuria, flank pain, hematuria and vaginal discharge.   Musculoskeletal: Positive for arthralgias, back pain, myalgias and neck pain. Negative for neck stiffness.   Skin: Positive for wound.   Allergic/Immunologic: Negative.    Neurological: Positive for weakness (Lower extremities) and numbness (Legs feel numb at times). Negative for dizziness, seizures and facial asymmetry.   Hematological: Negative.    Psychiatric/Behavioral: Negative for decreased concentration, self-injury and suicidal ideas. The patient is not nervous/anxious.        Objective     /70 (BP Location: Left arm, Patient Position: Sitting, Cuff Size: Adult)  Pulse 75  Temp 96.9 °F (36.1 °C) (Tympanic)   Ht 65\" (165.1 cm)  Wt 185 lb (83.9 kg)  SpO2 97%  BMI 30.79 kg/m2  Admission on 07/14/2017, Discharged on " 07/14/2017   Component Date Value Ref Range Status   • HCG, Urine, QL 07/14/2017 Negative  Negative Final   • Lot Number 07/14/2017 [id number]   Final   • Internal Positive Control 07/14/2017 Positive   Final   • Internal Negative Control 07/14/2017 Negative   Final   • ABO Type 07/14/2017 A   Final   • RH type 07/14/2017 Positive   Final   • Antibody Screen 07/14/2017 Negative   Final   • Glucose 07/14/2017 96  70 - 130 mg/dL Final       Physical Exam   Constitutional: She is oriented to person, place, and time. She appears well-developed and well-nourished. No distress.   HENT:   Head: Atraumatic.   Mouth/Throat: No oropharyngeal exudate.   Eyes: EOM are normal. Pupils are equal, round, and reactive to light.   Neck: Neck supple. No tracheal deviation present. No thyromegaly present.   Cardiovascular: Normal rate, regular rhythm and normal heart sounds.    Pulmonary/Chest: Effort normal and breath sounds normal. No respiratory distress. She has no wheezes. She has no rales.   Abdominal: Soft. Bowel sounds are normal. She exhibits no distension. There is generalized tenderness (General tenderness due to recent surgical procedure). There is no rigidity, no rebound, no guarding, no CVA tenderness, no tenderness at McBurney's point and negative Chambers's sign.       Laparoscopic sites ×5 no erythema.  2 of the 5 laparoscopic sites do have small amounts of yellow drainage with small areas of wound opening.    Patient has multiple scars on her abdomen from previous boils.    Blood cultures obtained from sites documented as a graphical site 1 and graphical site 2.     Musculoskeletal: She exhibits tenderness.        Cervical back: She exhibits bony tenderness.        Lumbar back: She exhibits decreased range of motion and tenderness.   Lymphadenopathy:     She has no cervical adenopathy.   Neurological: She is alert and oriented to person, place, and time.   Skin: Skin is warm and dry. No rash noted. She is not  diaphoretic. There is pallor.   Psychiatric: She has a normal mood and affect. Her speech is normal and behavior is normal. Judgment and thought content normal. Cognition and memory are normal.   Vitals reviewed.      Assessment/Plan     Problem List Items Addressed This Visit        Cardiovascular and Mediastinum    Controlled type 2 DM with peripheral circulatory disorder       Digestive    Colovaginal fistula       Musculoskeletal and Integument    Osteoarthritis, multiple sites    Relevant Medications    HYDROcodone-acetaminophen (NORCO)  MG per tablet    Other Relevant Orders    Culture, Routine    HNP (herniated nucleus pulposus), lumbar    Bulge of lumbar disc without myelopathy    Relevant Orders    Culture, Routine       Other    Hx of laparoscopic gastric banding      Other Visit Diagnoses     Drainage from wound    -  Primary    Relevant Medications    cephalexin (KEFLEX) 500 MG capsule    Other Relevant Orders    Culture, Routine    Culture, Routine    High risk medication use        Relevant Orders    Urine Drug Screen    Culture, Routine      I reviewed all active diagnoses and medical history with patient today.  Culture obtained from sites 1 and 2; see graphical documentation under abdomen.  We will decrease the patient's Norco to #90 from #120.  We will evaluate for effectiveness and continued need for controller medication.  Start Keflex 500 mg 1 by mouth 3 times a day ×10 days.  Patient has been instructed to wash her abdomen daily with antibacterial soap and pat dry with a clean towel.  Avoid touching sites.  Wash hands often.  Report any changes or worsening of areas.  Monitor for and report any fever.  I have discussed diagnosis in detail today allowing time for questions and answers. Pt is aware of reasons to seek urgent or emergent medical care as well as reasons to return to the clinic for evaluation. Possible side effects, interactions and progression of symptoms discussed as well. Pt  / family states understanding.   Goal: Improved quality of life and reduction in pain as evidenced by pt report.   Marques has been reviewed as consistent.  Uds is on file.   Patient has been counseled today regarding possible misuse and addiction related to controlled medication use.  I have reviewed recent inpatient surgical notes and consult notes and discussed with patient.  Discussed appropriate diet with increased protein, decreased carbohydrate intake and adequate fluid intake.  Stop soda.  Time spent face-to-face today 40 minutes with 50% time spent counseling regarding infection control and postoperative precautions.  Follow-up in 1-2 weeks, sooner if needed.  Routine med refill visits will be conducted monthly, sooner if needed.           This document has been electronically signed by:  CHARMAINE Doran, NP-C

## 2017-08-28 RX ORDER — ERGOCALCIFEROL 1.25 MG/1
CAPSULE ORAL
Qty: 4 CAPSULE | Refills: 5 | Status: SHIPPED | OUTPATIENT
Start: 2017-08-28 | End: 2017-12-20 | Stop reason: SDUPTHER

## 2017-08-28 RX ORDER — LINACLOTIDE 290 UG/1
CAPSULE, GELATIN COATED ORAL
Qty: 30 CAPSULE | Refills: 5 | Status: SHIPPED | OUTPATIENT
Start: 2017-08-28 | End: 2017-12-20 | Stop reason: SDUPTHER

## 2017-08-28 RX ORDER — GABAPENTIN 300 MG/1
CAPSULE ORAL
Qty: 90 CAPSULE | Refills: 2 | Status: SHIPPED | OUTPATIENT
Start: 2017-08-28 | End: 2017-10-17 | Stop reason: SDUPTHER

## 2017-08-29 LAB
BACTERIA SPEC AEROBE CULT: ABNORMAL
BACTERIA SPEC AEROBE CULT: ABNORMAL
BACTERIA SPEC CULT: ABNORMAL
OTHER ANTIBIOTIC SUSC ISLT: ABNORMAL
OTHER ANTIBIOTIC SUSC ISLT: ABNORMAL

## 2017-08-30 ENCOUNTER — TELEPHONE (OUTPATIENT)
Dept: FAMILY MEDICINE CLINIC | Facility: CLINIC | Age: 47
End: 2017-08-30

## 2017-08-31 ENCOUNTER — TELEPHONE (OUTPATIENT)
Dept: FAMILY MEDICINE CLINIC | Facility: CLINIC | Age: 47
End: 2017-08-31

## 2017-09-05 ENCOUNTER — TELEPHONE (OUTPATIENT)
Dept: FAMILY MEDICINE CLINIC | Facility: CLINIC | Age: 47
End: 2017-09-05

## 2017-09-05 NOTE — TELEPHONE ENCOUNTER
[facility] have contacted milka and they want her to come there tomorrow 8-6-2017 I have sent her records to them and I have let milka know that it is very important to keep this appointment

## 2017-09-19 ENCOUNTER — OFFICE VISIT (OUTPATIENT)
Dept: FAMILY MEDICINE CLINIC | Facility: CLINIC | Age: 47
End: 2017-09-19

## 2017-09-19 VITALS
SYSTOLIC BLOOD PRESSURE: 110 MMHG | DIASTOLIC BLOOD PRESSURE: 80 MMHG | HEART RATE: 75 BPM | WEIGHT: 180 LBS | TEMPERATURE: 96.9 F | BODY MASS INDEX: 29.99 KG/M2 | OXYGEN SATURATION: 99 % | HEIGHT: 65 IN

## 2017-09-19 DIAGNOSIS — Z23 ENCOUNTER FOR IMMUNIZATION: ICD-10-CM

## 2017-09-19 DIAGNOSIS — Z30.9 ENCOUNTER FOR CONTRACEPTIVE MANAGEMENT, UNSPECIFIED CONTRACEPTIVE ENCOUNTER TYPE: ICD-10-CM

## 2017-09-19 DIAGNOSIS — E11.51 CONTROLLED TYPE 2 DM WITH PERIPHERAL CIRCULATORY DISORDER (HCC): Primary | ICD-10-CM

## 2017-09-19 DIAGNOSIS — Z12.39 SCREENING FOR BREAST CANCER: ICD-10-CM

## 2017-09-19 DIAGNOSIS — M51.36 DDD (DEGENERATIVE DISC DISEASE), LUMBAR: ICD-10-CM

## 2017-09-19 DIAGNOSIS — Z12.31 ENCOUNTER FOR SCREENING MAMMOGRAM FOR MALIGNANT NEOPLASM OF BREAST: ICD-10-CM

## 2017-09-19 DIAGNOSIS — M15.9 PRIMARY OSTEOARTHRITIS INVOLVING MULTIPLE JOINTS: ICD-10-CM

## 2017-09-19 PROCEDURE — 99214 OFFICE O/P EST MOD 30 MIN: CPT | Performed by: NURSE PRACTITIONER

## 2017-09-19 PROCEDURE — 90686 IIV4 VACC NO PRSV 0.5 ML IM: CPT | Performed by: NURSE PRACTITIONER

## 2017-09-19 PROCEDURE — 81025 URINE PREGNANCY TEST: CPT | Performed by: NURSE PRACTITIONER

## 2017-09-19 PROCEDURE — 96372 THER/PROPH/DIAG INJ SC/IM: CPT | Performed by: NURSE PRACTITIONER

## 2017-09-19 PROCEDURE — G0008 ADMIN INFLUENZA VIRUS VAC: HCPCS | Performed by: NURSE PRACTITIONER

## 2017-09-19 PROCEDURE — 96160 PT-FOCUSED HLTH RISK ASSMT: CPT | Performed by: NURSE PRACTITIONER

## 2017-09-19 PROCEDURE — G0438 PPPS, INITIAL VISIT: HCPCS | Performed by: NURSE PRACTITIONER

## 2017-09-19 RX ORDER — HYDROCODONE BITARTRATE AND ACETAMINOPHEN 10; 325 MG/1; MG/1
1 TABLET ORAL EVERY 6 HOURS PRN
Qty: 90 TABLET | Refills: 0 | Status: SHIPPED | OUTPATIENT
Start: 2017-09-19 | End: 2017-10-17 | Stop reason: SDUPTHER

## 2017-09-19 RX ORDER — HYDROCODONE BITARTRATE AND ACETAMINOPHEN 10; 325 MG/1; MG/1
1 TABLET ORAL EVERY 6 HOURS PRN
Qty: 90 TABLET | Refills: 0 | Status: SHIPPED | OUTPATIENT
Start: 2017-09-19 | End: 2017-09-19 | Stop reason: SDUPTHER

## 2017-09-19 RX ORDER — MEDROXYPROGESTERONE ACETATE 150 MG/ML
150 INJECTION, SUSPENSION INTRAMUSCULAR ONCE
Status: COMPLETED | OUTPATIENT
Start: 2017-09-19 | End: 2017-09-19

## 2017-09-19 RX ADMIN — CYANOCOBALAMIN 1000 MCG: 1000 INJECTION, SOLUTION INTRAMUSCULAR; SUBCUTANEOUS at 11:03

## 2017-09-19 RX ADMIN — MEDROXYPROGESTERONE ACETATE 150 MG: 150 INJECTION, SUSPENSION INTRAMUSCULAR at 11:02

## 2017-09-19 NOTE — PROGRESS NOTES
QUICK REFERENCE INFORMATION:  The ABCs of the Annual Wellness Visit    Initial Medicare Wellness Visit    HEALTH RISK ASSESSMENT    1970    Recent Hospitalizations:  Recently treated at the following:  Other: [facility] .        Current Medical Providers:  Patient Care Team:  CHARMAINE Dejesus as PCP - General  CHARMAINE Dejesus as PCP - Family Medicine        Smoking Status:  History   Smoking Status   • Current Every Day Smoker   • Packs/day: 1.00   • Years: 30.00   • Types: Cigarettes   Smokeless Tobacco   • Never Used     Smoke cessation counseling provided today  Alcohol Consumption:  History   Alcohol Use No       Depression Screen:   PHQ-2/PHQ-9 Depression Screening 9/19/2017   Little interest or pleasure in doing things 3   Feeling down, depressed, or hopeless 0   Trouble falling or staying asleep, or sleeping too much 1   Feeling tired or having little energy 3   Poor appetite or overeating 3   Feeling bad about yourself - or that you are a failure or have let yourself or your family down 1   Trouble concentrating on things, such as reading the newspaper or watching television 3   Moving or speaking so slowly that other people could have noticed. Or the opposite - being so fidgety or restless that you have been moving around a lot more than usual 3   Thoughts that you would be better off dead, or of hurting yourself in some way 0   Total Score 17       Health Habits and Functional and Cognitive Screening:  Functional & Cognitive Status 9/19/2017   Do you have difficulty preparing food and eating? Yes   Do you have difficulty bathing yourself? Yes   Do you have difficulty getting dressed? No   Do you have difficulty using the toilet? Yes   Do you have difficulty moving around from place to place? No   In the past year have you fallen or experienced a near fall? Yes   Do you need help using the phone?  No   Are you deaf or do you have serious difficulty hearing?   No   Do you need help with transportation? Yes   Do you need help shopping? Yes   Do you need help preparing meals?  No   Do you need help with housework?  Yes   Do you need help with laundry? No   Do you need help taking your medications? No   Do you need help managing money? No   Do you have difficulty concentrating, remembering or making decisions? Yes       Health Habits  Current Diet: Low Carb Diet  Dental Exam: Not up to date  Eye Exam: Not up to date  Exercise (times per week): 0 times per week  Current Exercise Activities Include: None          Does the patient have evidence of cognitive impairment? No    Asiprin use counseling: Taking ASA appropriately as indicated      Recent Lab Results:    Visual Acuity:   Visual Acuity Screening    Right eye Left eye Both eyes   Without correction: 20 20 20 20 20 20   With correction:        Hearing is adequate via whisper test at 5 and 10 feet.     Age-appropriate Screening Schedule:  Refer to the list below for future screening recommendations based on patient's age, sex and/or medical conditions. Orders for these recommended tests are listed in the plan section. The patient has been provided with a written plan.    Health Maintenance   Topic Date Due   • PNEUMOCOCCAL VACCINE (19-64 MEDIUM RISK) (1 of 1 - PPSV23) 02/13/1988   • TDAP/TD VACCINES (1 - Tdap) 02/13/1988   • PAP SMEAR  06/09/2016   • HEMOGLOBIN A1C  08/27/2017   • URINE MICROALBUMIN  02/27/2018   • DIABETIC FOOT EXAM  09/19/2018   • MAMMOGRAM  09/19/2018   • DIABETIC EYE EXAM  09/19/2018   • DXA SCAN  01/17/2019   • COLONOSCOPY  11/17/2021   • INFLUENZA VACCINE  Completed        Subjective   History of Present Illness    Mirian Fischer is a 47 y.o. female who presents for an Annual Wellness Visit.    Contraceptive management-Mirian would like to continue with her Depo-Provera injections.  She wishes to prevent pregnancy as well as help regulate her previously very heavy menstrual cycle.  She has scant  flow if any while on Depakote Provera.    B-12 deficiency-patient is requesting a B12 injection today.  She states B 12 injections help give her energy.    Recent laparoscopic surgery with wound site infection-patient did not keep her follow-up appointment with the surgical group that performed her surgery.  She did finish the antibiotics provided by this office.  She reports that she no longer has any drainage, fever or problems with the incision sites.      Chronic joint pain and osteoarthritis-patient has experienced chronic joint pain in her neck, mid back and low back for greater than 5 years.  She has radiology on file.  Recent MRI as well as x-ray are on file in [facility].  Patient has been seen by both orthopedic consult and neuro surgeon consult.  Spinal injections have been attempted to ease her pain and symptoms.  Patient has attended physical therapy in the past with little improvement.  She will likely need neurosurgery in the near future.  She rates her pain as 7 on a pain scale rating of 0-10 today.  Describes her pain as sharp and stabbing with catching feelings in her neck and mid back and low back.  She denies any substance abuse or addiction.    Patient currently provides care to her handicapped child who is dependent for all ADLs.    Tobacco abuse-patient continues to smoke approximately one pack a day.    Recent bariatric surgery-patient states that she has not been for a feeling of her lap band.  She continues to have a gradual weight loss.  She admits to noncompliance with bariatric diet.  She does drink soda and eat..    Patient reports that she no longer has fecal material coming from her vagina and that she is not having any dysuria.      The following portions of the patient's history were reviewed and updated as appropriate: allergies, current medications, past family history, past medical history, past social history, past surgical history and problem list.    Outpatient Medications Prior to  Visit   Medication Sig Dispense Refill   • atenolol (TENORMIN) 25 MG tablet TAKE 1 TABLET BY MOUTH TWICE DAILY 60 tablet 6   • diclofenac (VOLTAREN) 75 MG EC tablet TAKE ONE TABLET BY MOUTH TWO TIMES A DAY 60 tablet 5   • DULoxetine (CYMBALTA) 60 MG capsule TAKE 1 CAPSULE BY MOUTH DAILY. 30 capsule 3   • esomeprazole (nexIUM) 40 MG capsule TAKE ONE CAPSULE BY MOUTH EVERY MORNING BEFORE BREAKFAST 30 capsule 5   • fluticasone (FLONASE) 50 MCG/ACT nasal spray 1 spray into each nostril Daily. Administer 1 sprays in each nostril for each dose. 1 each 5   • gabapentin (NEURONTIN) 300 MG capsule TAKE 1 CAPSULE BY MOUTH THREE TIMES A DAY 90 capsule 2   • hydrochlorothiazide (HYDRODIURIL) 25 MG tablet TAKE ONE TABLET BY MOUTH EVERY DAY 30 tablet 5   • levocetirizine (XYZAL) 5 MG tablet TAKE ONE TABLET BY MOUTH EVERY EVENING 30 tablet 5   • LINZESS 290 MCG capsule capsule TAKE ONE CAPSULE BY MOUTH EVERY MORNING BEFORE BREAKFAST 30 capsule 5   • lisinopril (PRINIVIL,ZESTRIL) 20 MG tablet TAKE 1/2 TABLET BY MOUTH DAILY 30 tablet 5   • medroxyPROGESTERone (DEPO-PROVERA) 150 MG/ML injection Inject 1 mL into the shoulder, thigh, or buttocks every 3 (three) months. (Patient taking differently: Inject 150 mg into the shoulder, thigh, or buttocks Every 3 (Three) Months. Patient had 2 & 1/2 months ago) 1 mL 3   • metFORMIN XR (GLUCOPHAGE-XR) 500 MG 24 hr tablet Take 1 tablet by mouth Daily. 30 tablet 5   • methocarbamol (ROBAXIN) 750 MG tablet TAKE ONE TABLET BY MOUTH TWO TIMES A DAY 60 tablet 5   • Multiple Vitamins-Minerals (MULTI-VITAMIN GUMMIES) chewable tablet Chew.     • nizatidine (AXID) 150 MG capsule TAKE ONE CAPSULE BY MOUTH TWO TIMES A DAY 60 capsule 5   • ondansetron (ZOFRAN) 4 MG tablet Take 4 mg by mouth Every 6 (Six) Hours As Needed for Nausea or Vomiting.     • potassium chloride (K-DUR) 10 MEQ CR tablet Take 1 tablet by mouth 2 (Two) Times a Day. 60 tablet 5   • simvastatin (ZOCOR) 20 MG tablet TAKE 1 TABLET BY MOUTH  DAILY. 30 tablet 5   • vitamin D (ERGOCALCIFEROL) 76584 units capsule capsule TAKE ONE CAPSULE BY MOUTH EVERY 7 DAYS *TAKE EVERY WEDNESDAY 4 capsule 5   • HYDROcodone-acetaminophen (NORCO)  MG per tablet Take 1 tablet by mouth Every 6 (Six) Hours As Needed for Moderate Pain . 90 tablet 0   • cephalexin (KEFLEX) 500 MG capsule Take 1 capsule by mouth 3 (Three) Times a Day. 30 capsule 0     Facility-Administered Medications Prior to Visit   Medication Dose Route Frequency Provider Last Rate Last Dose   • cyanocobalamin injection 1,000 mcg  1,000 mcg Intramuscular Q28 Days CHARMAINE Dejesus   1,000 mcg at 09/19/17 1103       Patient Active Problem List   Diagnosis   • Osteoarthritis, multiple sites   • HNP (herniated nucleus pulposus), lumbar   • Spinal stenosis, lumbar region, with neurogenic claudication   • Tobacco use   • Encounter for contraceptive management   • Diverticulitis of large intestine with perforation without bleeding   • DDD (degenerative disc disease), lumbar   • Bulge of lumbar disc without myelopathy   • Obesity (BMI 30-39.9)   • Controlled type 2 DM with peripheral circulatory disorder   • Hypertension   • Chronic left shoulder pain   • Cubital tunnel syndrome   • Anemia due to vitamin B12 deficiency   • Urinary and bowel incontinence   • Colovaginal fistula   • Hx of laparoscopic gastric banding       Advance Care Planning:  has NO advance directive - information provided to the patient today    Identification of Risk Factors:  Risk factors include: weight , unhealthy diet, cardiovascular risk, inactivity, tobacco use, chronic pain, caretaker stress and depression.    Review of Systems   Constitutional: Positive for activity change, appetite change and fatigue. Negative for chills, diaphoresis, fever and unexpected weight change.   Eyes: Negative for visual disturbance.   Respiratory: Positive for apnea (sleep apnea, does not wear PAP machine though she has one ). Negative for  cough, chest tightness, shortness of breath and wheezing.    Cardiovascular: Positive for leg swelling (at times of ). Negative for chest pain and palpitations.   Gastrointestinal: Positive for constipation (takes linzess which does control constipation ) and nausea. Negative for abdominal distention, abdominal pain, anal bleeding, diarrhea and vomiting.   Genitourinary: Positive for frequency. Negative for decreased urine volume, difficulty urinating, dysuria, flank pain and vaginal discharge.   Musculoskeletal: Positive for arthralgias, back pain and neck pain. Negative for neck stiffness.   Skin: Negative for rash and wound.   Neurological: Positive for headaches (randomly ).   Psychiatric/Behavioral: Negative for behavioral problems, confusion, decreased concentration, dysphoric mood, self-injury and suicidal ideas. The patient is not nervous/anxious.        Compared to one year ago, the patient feels her physical health is better.  Compared to one year ago, the patient feels her mental health is better.    Objective     Physical Exam   Constitutional: She is oriented to person, place, and time. Vital signs are normal. She appears well-developed and well-nourished.  Non-toxic appearance. She does not have a sickly appearance. She does not appear ill. No distress.   Pleasant adult female.   HENT:   Head: Normocephalic.   Right Ear: External ear normal.   Left Ear: External ear normal.   Nose: Nose normal.   Mouth/Throat: Oropharynx is clear and moist.   Eyes: Pupils are equal, round, and reactive to light.   Neck: Normal range of motion. Neck supple. No tracheal deviation present. No thyromegaly present.   Cardiovascular: Normal rate, regular rhythm and normal heart sounds.  Exam reveals no gallop and no friction rub.    No murmur heard.  Pulmonary/Chest: Effort normal and breath sounds normal. No respiratory distress. She has no wheezes. She has no rales. She exhibits no tenderness.   Abdominal: Soft. Bowel  "sounds are normal. She exhibits no distension and no mass. There is no tenderness. There is no rebound and no guarding.   Musculoskeletal:        Cervical back: She exhibits bony tenderness and spasm.        Lumbar back: She exhibits decreased range of motion, bony tenderness and spasm.    Mirian had a diabetic foot exam performed today.  Neurological: She is alert and oriented to person, place, and time. She has normal reflexes.   CN 2-12 grossly intact    Skin: Skin is warm and dry. No rash noted. She is not diaphoretic. No erythema.   Recent laparoscopic sites on abdomen are closed with no erythema or drainage.   Psychiatric: She has a normal mood and affect. Her speech is normal and behavior is normal. Judgment and thought content normal. Cognition and memory are normal.   Vitals reviewed.      Vitals:    09/19/17 0929   BP: 110/80   BP Location: Left arm   Patient Position: Sitting   Cuff Size: Adult   Pulse: 75   Temp: 96.9 °F (36.1 °C)   TempSrc: Tympanic   SpO2: 99%   Weight: 180 lb (81.6 kg)   Height: 65\" (165.1 cm)       Body mass index is 29.95 kg/(m^2).  Discussed the patient's BMI with her. The BMI is above average; BMI management plan is completed.  Recently had lap band surgery. Pt is losing weight and working on heart healthy living.       Assessment/Plan   Patient Self-Management and Personalized Health Advice  The patient has been provided with information about: diet, exercise, weight management, prevention of cardiac or vascular disease, the relationship between weight and GERD, tobacco cessation, fall prevention, designing advance directives, supplements and mental health concerns and preventive services including:   · Advance directive, Bone densitometry screening, Counseling for cardiovascular disease risk reduction, Exercise counseling provided, Fall Risk assessment done, Influenza vaccine, Nutrition counseling provided, Screening mammography, referral placed, Smoking cessation counseling " completed, Urinary Incontinence assessment done.    Visit Diagnoses:    ICD-10-CM ICD-9-CM   1. Controlled type 2 DM with peripheral circulatory disorder E11.51 250.70     443.81   2. Primary osteoarthritis involving multiple joints M15.0 715.09   3. DDD (degenerative disc disease), lumbar M51.36 722.52   4. Encounter for contraceptive management, unspecified contraceptive encounter type Z30.9 V25.9   5. Encounter for immunization Z23 V03.89   6. Screening for breast cancer Z12.39 V76.10   7. Encounter for screening mammogram for malignant neoplasm of breast  Z12.31 V76.12       Orders Placed This Encounter   Procedures   • Mammo Screening Digital Tomosynthesis Bilateral With CAD     Standing Status:   Future     Standing Expiration Date:   9/19/2018     Order Specific Question:   Reason for Exam:     Answer:   screening     Order Specific Question:   Patient Pregnant     Answer:   No   • Flu Vaccine Quad PF 3YR+ (FLUARIX/FLUZONE [id number])   • POC Pregnancy, Urine       Outpatient Encounter Prescriptions as of 9/19/2017   Medication Sig Dispense Refill   • atenolol (TENORMIN) 25 MG tablet TAKE 1 TABLET BY MOUTH TWICE DAILY 60 tablet 6   • diclofenac (VOLTAREN) 75 MG EC tablet TAKE ONE TABLET BY MOUTH TWO TIMES A DAY 60 tablet 5   • DULoxetine (CYMBALTA) 60 MG capsule TAKE 1 CAPSULE BY MOUTH DAILY. 30 capsule 3   • esomeprazole (nexIUM) 40 MG capsule TAKE ONE CAPSULE BY MOUTH EVERY MORNING BEFORE BREAKFAST 30 capsule 5   • fluticasone (FLONASE) 50 MCG/ACT nasal spray 1 spray into each nostril Daily. Administer 1 sprays in each nostril for each dose. 1 each 5   • gabapentin (NEURONTIN) 300 MG capsule TAKE 1 CAPSULE BY MOUTH THREE TIMES A DAY 90 capsule 2   • hydrochlorothiazide (HYDRODIURIL) 25 MG tablet TAKE ONE TABLET BY MOUTH EVERY DAY 30 tablet 5   • HYDROcodone-acetaminophen (NORCO)  MG per tablet Take 1 tablet by mouth Every 6 (Six) Hours As Needed for Moderate Pain . 90 tablet 0   • levocetirizine  (XYZAL) 5 MG tablet TAKE ONE TABLET BY MOUTH EVERY EVENING 30 tablet 5   • LINZESS 290 MCG capsule capsule TAKE ONE CAPSULE BY MOUTH EVERY MORNING BEFORE BREAKFAST 30 capsule 5   • lisinopril (PRINIVIL,ZESTRIL) 20 MG tablet TAKE 1/2 TABLET BY MOUTH DAILY 30 tablet 5   • medroxyPROGESTERone (DEPO-PROVERA) 150 MG/ML injection Inject 1 mL into the shoulder, thigh, or buttocks every 3 (three) months. (Patient taking differently: Inject 150 mg into the shoulder, thigh, or buttocks Every 3 (Three) Months. Patient had 2 & 1/2 months ago) 1 mL 3   • metFORMIN XR (GLUCOPHAGE-XR) 500 MG 24 hr tablet Take 1 tablet by mouth Daily. 30 tablet 5   • methocarbamol (ROBAXIN) 750 MG tablet TAKE ONE TABLET BY MOUTH TWO TIMES A DAY 60 tablet 5   • Multiple Vitamins-Minerals (MULTI-VITAMIN GUMMIES) chewable tablet Chew.     • nizatidine (AXID) 150 MG capsule TAKE ONE CAPSULE BY MOUTH TWO TIMES A DAY 60 capsule 5   • ondansetron (ZOFRAN) 4 MG tablet Take 4 mg by mouth Every 6 (Six) Hours As Needed for Nausea or Vomiting.     • potassium chloride (K-DUR) 10 MEQ CR tablet Take 1 tablet by mouth 2 (Two) Times a Day. 60 tablet 5   • simvastatin (ZOCOR) 20 MG tablet TAKE 1 TABLET BY MOUTH DAILY. 30 tablet 5   • vitamin D (ERGOCALCIFEROL) 94848 units capsule capsule TAKE ONE CAPSULE BY MOUTH EVERY 7 DAYS *TAKE EVERY WEDNESDAY 4 capsule 5   • [DISCONTINUED] HYDROcodone-acetaminophen (NORCO)  MG per tablet Take 1 tablet by mouth Every 6 (Six) Hours As Needed for Moderate Pain . 90 tablet 0   • [DISCONTINUED] HYDROcodone-acetaminophen (NORCO)  MG per tablet Take 1 tablet by mouth Every 6 (Six) Hours As Needed for Moderate Pain . 90 tablet 0   • [DISCONTINUED] cephalexin (KEFLEX) 500 MG capsule Take 1 capsule by mouth 3 (Three) Times a Day. 30 capsule 0     Facility-Administered Encounter Medications as of 9/19/2017   Medication Dose Route Frequency Provider Last Rate Last Dose   • cyanocobalamin injection 1,000 mcg  1,000 mcg  Intramuscular Q28 Days Jaleesa ContrerasCHARMAINE   1,000 mcg at 09/19/17 1103   • [COMPLETED] medroxyPROGESTERone (DEPO-PROVERA) injection 150 mg  150 mg Intramuscular Once Jaleesa ContrerasCHARMAINE   150 mg at 09/19/17 1102     Depo-Provera injection today.  Negative pregnancy risk.  Refill [place]  one by mouth every 6 hours when necessary #90 no refill.  Flu vaccine today.  B-12 injection per standing order.  I have discussed diagnosis in detail today allowing time for questions and answers. Pt is aware of reasons to seek urgent or emergent medical care as well as reasons to return to the clinic for evaluation. Possible side effects, interactions and progression of symptoms discussed as well. Pt / family states understanding.   Marques has been reviewed as consistent.  Uds is on file.   Goal: Improved quality of life and reduction in pain as evidenced by pt report.   Patient has been counseled today for approximately 5 minutes regarding the risk of opioid addiction or abuse.  Patient denies any substance abuse or addiction.    Reviewed use of high risk medication in the elderly: yes  Reviewed for potential of harmful drug interactions in the elderly: yes  Schedule Pap smear in January at patient request.  Schedule routine labs in the next few months including a hemoglobin A1c.  Patient declines Tdap and Pneumovax.    Follow Up:  Return in about 1 month (around 10/19/2017) for Recheck/ schedule pap in January .     An After Visit Summary and PPPS with all of these plans were given to the patient.

## 2017-09-19 NOTE — PROGRESS NOTES
"Walter Fischer is a 47 y.o. female.     No chief complaint on file.      History of Present Illness     The following portions of the patient's history were reviewed and updated as appropriate: allergies, current medications, past family history, past medical history, past social history, past surgical history and problem list.    Review of Systems    Objective     /80 (BP Location: Left arm, Patient Position: Sitting, Cuff Size: Adult)  Pulse 75  Temp 96.9 °F (36.1 °C) (Tympanic)   Ht 65\" (165.1 cm)  Wt 180 lb (81.6 kg)  SpO2 99%  BMI 29.95 kg/m2  Office Visit on 08/24/2017   Component Date Value Ref Range Status   • Culture 08/24/2017 Final report*  Final   • Result 1 08/24/2017 Staphylococcus aureus*  Final   • Result 2 08/24/2017 Mixed skin trudy   Final   • Susceptibility Testing 08/24/2017 Comment   Final   • Culture 08/24/2017 Final report*  Final   • Result 1 08/24/2017 Staphylococcus aureus*  Final   • Susceptibility Testing 08/24/2017 Comment   Final       Physical Exam    Assessment/Plan     Problem List Items Addressed This Visit     None                     This document has been electronically signed by:  CHARMAINE Doran, NP-C  "

## 2017-10-17 ENCOUNTER — OFFICE VISIT (OUTPATIENT)
Dept: FAMILY MEDICINE CLINIC | Facility: CLINIC | Age: 47
End: 2017-10-17

## 2017-10-17 VITALS
DIASTOLIC BLOOD PRESSURE: 75 MMHG | HEIGHT: 65 IN | BODY MASS INDEX: 29.66 KG/M2 | HEART RATE: 69 BPM | OXYGEN SATURATION: 98 % | TEMPERATURE: 98.6 F | WEIGHT: 178 LBS | SYSTOLIC BLOOD PRESSURE: 120 MMHG

## 2017-10-17 DIAGNOSIS — M51.36 BULGE OF LUMBAR DISC WITHOUT MYELOPATHY: ICD-10-CM

## 2017-10-17 DIAGNOSIS — D51.3 OTHER DIETARY VITAMIN B12 DEFICIENCY ANEMIA: Primary | ICD-10-CM

## 2017-10-17 DIAGNOSIS — M51.36 DDD (DEGENERATIVE DISC DISEASE), LUMBAR: ICD-10-CM

## 2017-10-17 DIAGNOSIS — Z98.84 STATUS POST BARIATRIC SURGERY: ICD-10-CM

## 2017-10-17 DIAGNOSIS — I10 ESSENTIAL HYPERTENSION: ICD-10-CM

## 2017-10-17 DIAGNOSIS — E11.51 CONTROLLED TYPE 2 DM WITH PERIPHERAL CIRCULATORY DISORDER (HCC): ICD-10-CM

## 2017-10-17 DIAGNOSIS — E55.9 VITAMIN D DEFICIENCY DISEASE: ICD-10-CM

## 2017-10-17 PROCEDURE — 96372 THER/PROPH/DIAG INJ SC/IM: CPT | Performed by: NURSE PRACTITIONER

## 2017-10-17 PROCEDURE — 99214 OFFICE O/P EST MOD 30 MIN: CPT | Performed by: NURSE PRACTITIONER

## 2017-10-17 RX ORDER — GABAPENTIN 300 MG/1
300 CAPSULE ORAL 3 TIMES DAILY
Qty: 90 CAPSULE | Refills: 2 | Status: SHIPPED | OUTPATIENT
Start: 2017-10-17 | End: 2017-11-28 | Stop reason: SDUPTHER

## 2017-10-17 RX ORDER — HYDROCODONE BITARTRATE AND ACETAMINOPHEN 10; 325 MG/1; MG/1
1 TABLET ORAL EVERY 6 HOURS PRN
Qty: 90 TABLET | Refills: 0 | Status: SHIPPED | OUTPATIENT
Start: 2017-10-17 | End: 2017-11-28 | Stop reason: SDUPTHER

## 2017-10-17 RX ADMIN — CYANOCOBALAMIN 1000 MCG: 1000 INJECTION, SOLUTION INTRAMUSCULAR; SUBCUTANEOUS at 12:14

## 2017-10-17 NOTE — PROGRESS NOTES
Subjective   Mirian Fischer is a 47 y.o. female.     Chief Complaint   Patient presents with   • Diabetes   • Osteoarthritis   • Anxiety       History of Present Illness     Status post bariatric surgery/patient had a lap band earlier this year.  She has lost almost 100 pounds.  She reports that she does feel better with the weight loss.  She is not getting her protein in.  She states that she mostly eats Ramen noodles.  She continues to drink soda though she is now drinking diet.  She has not followed up with her bariatric surgeon since her surgery.    Type 2 diabetes-patient states that her glucose has been well controlled with diet and weight loss.  She would like to discuss medication changes.    Hypertension-patient reports that her blood pressure has been well controlled with weight loss and diet changes.  She at times has a low blood pressure.  She is ready to discuss medication changes.  Patient currently takes lisinopril and atenolol as well as hydrochlorothiazide daily.    Chronic constipation-some improvement with diet changes.  Patient continues to take stool softeners and Linzess daily.     Allergy symptoms-patient reports that she has not had any exacerbation with the onset of fall allergy season.    Chronic degenerative disc disease with lumbar stenosis  Chronic low back pain which has been present for greater than 5 years.  Patient has a consult at neurosurgeon and orthopedic consult in the past.  She has had joint injections in the past.  Patient has attempted physical therapy which created increased pain.  Recent radiology is on file.  Most recent MRI does show degenerative disc changes in the lumbar region with spondylolithiasis at the L4/5.  There is narrowing of the lateral recesses and L3/4 and L4/5.  Spinal stenosis is most severe at L4/5.  There is a large herniation at L4/S1.  Patient rates her pain today as 5 on pain scale rating of 0-10.  She reports that any lifting, bending or  prolonged sitting increases her pain.  Patient has difficulty taking anti-inflammatory medication due to stomach irritation.  Patient is currently maintained on Norco 10/325 mg 1 by mouth every 6 hours when necessary.  Patient reports that thisher pain for a few hours allowing her to be more active in her home and do very mild/light housework.  Patient reports that her pain does interfere with her quality of life as well as ability to do the things that she enjoys.  Patient provides care for her handicapped child which is very physical.  Patient has no history of substance abuse or addiction.  Her pain is described as sharp with radiation into both lower extremities.  The radiation is a tingling burning feeling.  She does receive Neurontin 300 mg 3 times a day which is helpful with her pain and tingling.  Patient denies any side effects.      B-12 deficiency-patient receives monthly B12 injections which are helping with her energy level.      The following portions of the patient's history were reviewed and updated as appropriate: allergies, current medications, past family history, past medical history, past social history, past surgical history and problem list.    Review of Systems   Constitutional: Positive for chills (Patient finds that she is cold now that she has lost almost 100 pounds) and fatigue. Negative for diaphoresis, fever and unexpected weight change.   HENT: Negative.    Eyes: Negative.    Respiratory: Negative.    Cardiovascular: Positive for leg swelling (Legs do swell with prolonged standing, she currently takes hydrochlorothiazide which is helpful for the symptoms). Negative for chest pain and palpitations.   Endocrine: Negative.    Genitourinary: Negative for dysuria, frequency, pelvic pain and vaginal discharge.   Musculoskeletal: Positive for arthralgias, back pain, myalgias and neck pain. Negative for neck stiffness.   Allergic/Immunologic: Negative.    Neurological: Negative for dizziness,  "seizures and speech difficulty.   Hematological: Negative.    Psychiatric/Behavioral: Negative for decreased concentration, self-injury and suicidal ideas. The patient is not nervous/anxious.    All other systems reviewed and are negative.      Objective     /75 (BP Location: Left arm, Patient Position: Sitting)  Pulse 69  Temp 98.6 °F (37 °C) (Tympanic)   Ht 65\" (165.1 cm)  Wt 178 lb (80.7 kg)  SpO2 98%  BMI 29.62 kg/m2  Office Visit on 09/19/2017   Component Date Value Ref Range Status   • HCG, Urine, QL 09/19/2017 Negative  Negative Final   • Lot Number 09/19/2017 [id number]   Final   • Internal Positive Control 09/19/2017 Positive   Final   • Internal Negative Control 09/19/2017 Negative   Final       Physical Exam   Constitutional: She is oriented to person, place, and time. Vital signs are normal. She appears well-developed and well-nourished.  Non-toxic appearance. She does not have a sickly appearance. She does not appear ill. No distress.   Very pleasant lady.  She is talkative and friendly.  Weight loss is visible from last visit.   HENT:   Head: Normocephalic.   Right Ear: External ear normal.   Left Ear: External ear normal.   Nose: Nose normal.   Mouth/Throat: Oropharynx is clear and moist.   Eyes: Pupils are equal, round, and reactive to light.   Neck: Normal range of motion. Neck supple. No tracheal deviation present. No thyromegaly present.   Cardiovascular: Normal rate, regular rhythm and normal heart sounds.  Exam reveals no gallop and no friction rub.    No murmur heard.  Pulmonary/Chest: Effort normal and breath sounds normal. No respiratory distress. She has no wheezes. She has no rales. She exhibits no tenderness.   Abdominal: Soft. Bowel sounds are normal. She exhibits no distension and no mass. There is no tenderness. There is no rebound and no guarding.   Musculoskeletal:        Cervical back: She exhibits bony tenderness and spasm.        Lumbar back: She exhibits decreased " range of motion, bony tenderness and spasm.   Neurological: She is alert and oriented to person, place, and time. She has normal reflexes.   CN 2-12 grossly intact    Skin: Skin is warm and dry. No bruising, no lesion and no rash noted. She is not diaphoretic. No erythema.   Psychiatric: She has a normal mood and affect. Her speech is normal and behavior is normal. Judgment and thought content normal. Cognition and memory are normal.   Vitals reviewed.      Assessment/Plan     Problem List Items Addressed This Visit        Cardiovascular and Mediastinum    Controlled type 2 DM with peripheral circulatory disorder    Relevant Orders    CBC & Differential    Comprehensive Metabolic Panel    TSH    Hemoglobin A1c    Vitamin D 25 Hydroxy    MicroAlbumin, Urine, Random - Urine, Clean Catch    Lipid Panel    Vitamin B12    Hypertension    Relevant Orders    CBC & Differential    Comprehensive Metabolic Panel    TSH    Hemoglobin A1c    Vitamin D 25 Hydroxy    MicroAlbumin, Urine, Random - Urine, Clean Catch    Lipid Panel    Vitamin B12       Digestive    Anemia due to vitamin B12 deficiency - Primary    Relevant Orders    Vitamin B12       Musculoskeletal and Integument    DDD (degenerative disc disease), lumbar    Relevant Medications    HYDROcodone-acetaminophen (NORCO)  MG per tablet    Other Relevant Orders    Ambulatory Referral to Neurology (Completed)    Bulge of lumbar disc without myelopathy    Overview     Degenerative disc changes in the lumbar disc spaces. There  is a grade 1 spondylolisthesis at L4-5 felt to be secondary to facet  joint arthritis. There is  narrowing of the lateral recesses at L3-4 and  L4-5. The spinal stenosis is most severe at L4-5. There is a fairly  large disc herniation at L5-S1.           Other Visit Diagnoses     Vitamin D deficiency disease        Relevant Orders    Vitamin D 25 Hydroxy    Status post bariatric surgery        Relevant Orders    CBC & Differential     Comprehensive Metabolic Panel    TSH    Hemoglobin A1c    Vitamin D 25 Hydroxy    MicroAlbumin, Urine, Random - Urine, Clean Catch    Lipid Panel    Vitamin B12          Current Outpatient Prescriptions:   •  atenolol (TENORMIN) 25 MG tablet, TAKE 1 TABLET BY MOUTH TWICE DAILY, Disp: 60 tablet, Rfl: 6  •  diclofenac (VOLTAREN) 75 MG EC tablet, TAKE ONE TABLET BY MOUTH TWO TIMES A DAY, Disp: 60 tablet, Rfl: 5  •  DULoxetine (CYMBALTA) 60 MG capsule, TAKE 1 CAPSULE BY MOUTH DAILY., Disp: 30 capsule, Rfl: 3  •  esomeprazole (nexIUM) 40 MG capsule, TAKE ONE CAPSULE BY MOUTH EVERY MORNING BEFORE BREAKFAST, Disp: 30 capsule, Rfl: 5  •  fluticasone (FLONASE) 50 MCG/ACT nasal spray, 1 spray into each nostril Daily. Administer 1 sprays in each nostril for each dose., Disp: 1 each, Rfl: 5  •  gabapentin (NEURONTIN) 300 MG capsule, Take 1 capsule by mouth 3 (Three) Times a Day., Disp: 90 capsule, Rfl: 2  •  hydrochlorothiazide (HYDRODIURIL) 25 MG tablet, TAKE ONE TABLET BY MOUTH EVERY DAY, Disp: 30 tablet, Rfl: 5  •  HYDROcodone-acetaminophen (NORCO)  MG per tablet, Take 1 tablet by mouth Every 6 (Six) Hours As Needed for Moderate Pain ., Disp: 90 tablet, Rfl: 0  •  levocetirizine (XYZAL) 5 MG tablet, TAKE ONE TABLET BY MOUTH EVERY EVENING, Disp: 30 tablet, Rfl: 5  •  LINZESS 290 MCG capsule capsule, TAKE ONE CAPSULE BY MOUTH EVERY MORNING BEFORE BREAKFAST, Disp: 30 capsule, Rfl: 5  •  medroxyPROGESTERone (DEPO-PROVERA) 150 MG/ML injection, Inject 1 mL into the shoulder, thigh, or buttocks every 3 (three) months. (Patient taking differently: Inject 150 mg into the shoulder, thigh, or buttocks Every 3 (Three) Months. Patient had 2 & 1/2 months ago), Disp: 1 mL, Rfl: 3  •  methocarbamol (ROBAXIN) 750 MG tablet, TAKE ONE TABLET BY MOUTH TWO TIMES A DAY, Disp: 60 tablet, Rfl: 5  •  Multiple Vitamins-Minerals (MULTI-VITAMIN GUMMIES) chewable tablet, Chew., Disp: , Rfl:   •  nizatidine (AXID) 150 MG capsule, TAKE ONE CAPSULE  BY MOUTH TWO TIMES A DAY, Disp: 60 capsule, Rfl: 5  •  ondansetron (ZOFRAN) 4 MG tablet, Take 4 mg by mouth Every 6 (Six) Hours As Needed for Nausea or Vomiting., Disp: , Rfl:   •  potassium chloride (K-DUR) 10 MEQ CR tablet, Take 1 tablet by mouth 2 (Two) Times a Day., Disp: 60 tablet, Rfl: 5  •  simvastatin (ZOCOR) 20 MG tablet, TAKE 1 TABLET BY MOUTH DAILY., Disp: 30 tablet, Rfl: 5  •  vitamin D (ERGOCALCIFEROL) 23429 units capsule capsule, TAKE ONE CAPSULE BY MOUTH EVERY 7 DAYS *TAKE EVERY WEDNESDAY, Disp: 4 capsule, Rfl: 5    Current Facility-Administered Medications:   •  cyanocobalamin injection 1,000 mcg, 1,000 mcg, Intramuscular, Q28 Days, CHARMAINE Dejesus, 1,000 mcg at 10/17/17 1214    I have discussed diagnosis in detail today allowing time for questions and answers. Pt is aware of reasons to seek urgent or emergent medical care as well as reasons to return to the clinic for evaluation. Possible side effects, interactions and progression of symptoms discussed as well. Pt / family states understanding. Emotional support and active listening provided.   Medication changes:  Stop Lisinopril , monitor blood pressure and report elevations.  Stop Metformin, monitor glucose levels and report elevations.   Fasting labs this month, will consider decreasing zocor dose if controlled with weight loss and diet.   Marques has been reviewed as consistent.  Uds is on file.   Goal: Improved quality of life and reduction in pain as evidenced by pt report.   B-12 injection 1 cc IM per standing order.  Schedule patient with Dr. Oliva in [place] for follow-up appointment to discuss further spinal injections.  Referral has been placed.  Follow-up in one month, sooner if needed.           This document has been electronically signed by:  CHARMAINE Doran, NP-C

## 2017-10-31 RX ORDER — ATENOLOL 25 MG/1
TABLET ORAL
Qty: 60 TABLET | Refills: 3
Start: 2017-10-31 | End: 2017-11-28

## 2017-10-31 RX ORDER — DULOXETIN HYDROCHLORIDE 60 MG/1
CAPSULE, DELAYED RELEASE ORAL
Qty: 30 CAPSULE | Refills: 3
Start: 2017-10-31 | End: 2017-12-20 | Stop reason: SDUPTHER

## 2017-11-07 DIAGNOSIS — E11.9 TYPE 2 DIABETES MELLITUS NOT AT GOAL (HCC): ICD-10-CM

## 2017-11-08 RX ORDER — METFORMIN HYDROCHLORIDE 500 MG/1
TABLET, EXTENDED RELEASE ORAL
Qty: 30 TABLET | Refills: 5 | Status: SHIPPED | OUTPATIENT
Start: 2017-11-08 | End: 2017-11-28

## 2017-11-28 ENCOUNTER — OFFICE VISIT (OUTPATIENT)
Dept: FAMILY MEDICINE CLINIC | Facility: CLINIC | Age: 47
End: 2017-11-28

## 2017-11-28 VITALS
HEIGHT: 65 IN | OXYGEN SATURATION: 98 % | HEART RATE: 86 BPM | BODY MASS INDEX: 29.32 KG/M2 | TEMPERATURE: 97.7 F | DIASTOLIC BLOOD PRESSURE: 65 MMHG | SYSTOLIC BLOOD PRESSURE: 105 MMHG | WEIGHT: 176 LBS

## 2017-11-28 DIAGNOSIS — M51.26 HNP (HERNIATED NUCLEUS PULPOSUS), LUMBAR: ICD-10-CM

## 2017-11-28 DIAGNOSIS — K59.01 SLOW TRANSIT CONSTIPATION: Primary | ICD-10-CM

## 2017-11-28 DIAGNOSIS — E55.9 VITAMIN D DEFICIENCY DISEASE: ICD-10-CM

## 2017-11-28 DIAGNOSIS — Z98.84 HX OF LAPAROSCOPIC GASTRIC BANDING: ICD-10-CM

## 2017-11-28 DIAGNOSIS — M15.9 PRIMARY OSTEOARTHRITIS INVOLVING MULTIPLE JOINTS: ICD-10-CM

## 2017-11-28 DIAGNOSIS — M48.062 SPINAL STENOSIS, LUMBAR REGION, WITH NEUROGENIC CLAUDICATION: ICD-10-CM

## 2017-11-28 DIAGNOSIS — E11.51 CONTROLLED TYPE 2 DM WITH PERIPHERAL CIRCULATORY DISORDER (HCC): ICD-10-CM

## 2017-11-28 DIAGNOSIS — K21.9 GASTROESOPHAGEAL REFLUX DISEASE WITHOUT ESOPHAGITIS: ICD-10-CM

## 2017-11-28 DIAGNOSIS — M25.512 CHRONIC LEFT SHOULDER PAIN: ICD-10-CM

## 2017-11-28 DIAGNOSIS — D51.3 OTHER DIETARY VITAMIN B12 DEFICIENCY ANEMIA: ICD-10-CM

## 2017-11-28 DIAGNOSIS — Z98.84 STATUS POST BARIATRIC SURGERY: ICD-10-CM

## 2017-11-28 DIAGNOSIS — I10 ESSENTIAL HYPERTENSION: ICD-10-CM

## 2017-11-28 DIAGNOSIS — M51.36 BULGE OF LUMBAR DISC WITHOUT MYELOPATHY: ICD-10-CM

## 2017-11-28 DIAGNOSIS — M51.36 DDD (DEGENERATIVE DISC DISEASE), LUMBAR: ICD-10-CM

## 2017-11-28 DIAGNOSIS — Z79.899 HIGH RISK MEDICATION USE: ICD-10-CM

## 2017-11-28 DIAGNOSIS — G89.29 CHRONIC LEFT SHOULDER PAIN: ICD-10-CM

## 2017-11-28 PROBLEM — F17.200 NICOTINE ADDICTION: Status: ACTIVE | Noted: 2017-11-28

## 2017-11-28 PROBLEM — IMO0001 BLUES: Status: ACTIVE | Noted: 2017-11-28

## 2017-11-28 PROBLEM — N39.3 STRESS INCONTINENCE: Status: ACTIVE | Noted: 2017-11-28

## 2017-11-28 LAB
25(OH)D3+25(OH)D2 SERPL-MCNC: 62 NG/ML
ALBUMIN SERPL-MCNC: 4 G/DL (ref 3.5–5)
ALBUMIN/GLOB SERPL: 1.9 G/DL (ref 1.5–2.5)
ALP SERPL-CCNC: 87 U/L (ref 35–104)
ALT SERPL-CCNC: 32 U/L (ref 10–36)
AST SERPL-CCNC: 19 U/L (ref 10–30)
BASOPHILS # BLD AUTO: 0.06 10*3/MM3 (ref 0–0.3)
BASOPHILS NFR BLD AUTO: 0.7 % (ref 0–2)
BILIRUB SERPL-MCNC: 0.3 MG/DL (ref 0.2–1.8)
BUN SERPL-MCNC: 6 MG/DL (ref 7–21)
BUN/CREAT SERPL: 12 (ref 7–25)
CALCIUM SERPL-MCNC: 9.2 MG/DL (ref 7.7–10)
CHLORIDE SERPL-SCNC: 107 MMOL/L (ref 99–112)
CHOLEST SERPL-MCNC: 204 MG/DL (ref 0–200)
CO2 SERPL-SCNC: 28.9 MMOL/L (ref 24.3–31.9)
CREAT SERPL-MCNC: 0.5 MG/DL (ref 0.43–1.29)
EOSINOPHIL # BLD AUTO: 0.44 10*3/MM3 (ref 0–0.7)
EOSINOPHIL NFR BLD AUTO: 5.1 % (ref 0–5)
ERYTHROCYTE [DISTWIDTH] IN BLOOD BY AUTOMATED COUNT: 17.3 % (ref 11.5–14.5)
GFR SERPLBLD CREATININE-BSD FMLA CKD-EPI: 132 ML/MIN/1.73
GFR SERPLBLD CREATININE-BSD FMLA CKD-EPI: >150 ML/MIN/1.73
GLOBULIN SER CALC-MCNC: 2.1 GM/DL
GLUCOSE SERPL-MCNC: 91 MG/DL (ref 70–110)
HBA1C MFR BLD: 6.1 % (ref 4.5–5.7)
HCT VFR BLD AUTO: 45 % (ref 37–47)
HDLC SERPL-MCNC: 43 MG/DL (ref 60–100)
HGB BLD-MCNC: 14.4 G/DL (ref 12–16)
IMM GRANULOCYTES # BLD: 0.01 10*3/MM3 (ref 0–0.03)
IMM GRANULOCYTES NFR BLD: 0.1 % (ref 0–0.5)
LDLC SERPL CALC-MCNC: 118 MG/DL (ref 0–100)
LYMPHOCYTES # BLD AUTO: 2.63 10*3/MM3 (ref 1–3)
LYMPHOCYTES NFR BLD AUTO: 30.4 % (ref 21–51)
MCH RBC QN AUTO: 27.9 PG (ref 27–33)
MCHC RBC AUTO-ENTMCNC: 32 G/DL (ref 33–37)
MCV RBC AUTO: 87 FL (ref 80–94)
MONOCYTES # BLD AUTO: 0.64 10*3/MM3 (ref 0.1–0.9)
MONOCYTES NFR BLD AUTO: 7.4 % (ref 0–10)
NEUTROPHILS # BLD AUTO: 4.86 10*3/MM3 (ref 1.4–6.5)
NEUTROPHILS NFR BLD AUTO: 56.3 % (ref 30–70)
PLATELET # BLD AUTO: 308 10*3/MM3 (ref 130–400)
POTASSIUM SERPL-SCNC: 3.8 MMOL/L (ref 3.5–5.3)
PROT SERPL-MCNC: 6.1 G/DL (ref 6–8)
RBC # BLD AUTO: 5.17 10*6/MM3 (ref 4.2–5.4)
SODIUM SERPL-SCNC: 140 MMOL/L (ref 135–153)
TRIGL SERPL-MCNC: 216 MG/DL (ref 0–150)
TSH SERPL DL<=0.005 MIU/L-ACNC: 0.99 MIU/ML (ref 0.55–4.78)
VIT B12 SERPL-MCNC: 556 PG/ML (ref 211–911)
VLDLC SERPL CALC-MCNC: 43.2 MG/DL
WBC # BLD AUTO: 8.64 10*3/MM3 (ref 4.5–12.5)

## 2017-11-28 PROCEDURE — 96372 THER/PROPH/DIAG INJ SC/IM: CPT | Performed by: NURSE PRACTITIONER

## 2017-11-28 PROCEDURE — 99214 OFFICE O/P EST MOD 30 MIN: CPT | Performed by: NURSE PRACTITIONER

## 2017-11-28 RX ORDER — DICYCLOMINE HYDROCHLORIDE 10 MG/1
10 CAPSULE ORAL
Qty: 120 CAPSULE | Refills: 5 | Status: SHIPPED | OUTPATIENT
Start: 2017-11-28 | End: 2018-03-22

## 2017-11-28 RX ORDER — GABAPENTIN 300 MG/1
300 CAPSULE ORAL 3 TIMES DAILY
Qty: 90 CAPSULE | Refills: 2 | Status: SHIPPED | OUTPATIENT
Start: 2017-11-28 | End: 2018-01-23 | Stop reason: SDUPTHER

## 2017-11-28 RX ORDER — HYDROCODONE BITARTRATE AND ACETAMINOPHEN 10; 325 MG/1; MG/1
1 TABLET ORAL EVERY 6 HOURS PRN
Qty: 90 TABLET | Refills: 0 | Status: SHIPPED | OUTPATIENT
Start: 2017-11-28 | End: 2017-12-20 | Stop reason: SDUPTHER

## 2017-11-28 RX ADMIN — CYANOCOBALAMIN 1000 MCG: 1000 INJECTION, SOLUTION INTRAMUSCULAR; SUBCUTANEOUS at 11:39

## 2017-11-28 NOTE — PROGRESS NOTES
Subjective   Mirian Fischer is a 47 y.o. female.     Chief Complaint   Patient presents with   • Pain   • Fatigue   • Diabetes   • Hypertension       History of Present Illness     Hypertension-patient is now taking atenolol 25 mg twice daily and a water pill daily.  She is not drinking any fluid other than coffee and diet Pepsi.  She reports having some pedal edema daily.  He does feel weak at times.  Some dizziness when she stands suddenly.    Type 2 diabetes with circulatory disorder-stopped metformin last month.  No glucose elevations or symptomatic hypoglycemia/hyperglycemia.    B-12 deficiency anemia-do B12 injection today.  States B-12 injection to help provide her with energy.    History of colitis and slow transit constipation-patient reports having a full gassy feeling after meals.  She feels as if it sits in her stomach for prolonged times.  Takes multiple gas pills over-the-counter as well as Axid and Nexium.  She does not recall taking Bentyl in the past.    Status post bariatric surgery-lap band surgery has been successful with good patient weight loss.  She denies side effects.    Chronic degenerative disc disease with lumbar stenosis  Chronic low back pain which has been present for greater than 5 years.  Patient has consulted at neurosurgeon and orthopedic consult in the past.  She has had joint injections in the past.  Patient has attempted physical therapy which created increased pain.  Recent radiology is on file.  Most recent MRI does show degenerative disc changes in the lumbar region with spondylolithiasis at the L4/5.  There is narrowing of the lateral recesses and L3/4 and L4/5.  Spinal stenosis is most severe at L4/5.  There is a large herniation at L4/S1.  Patient rates her pain today as 8 on pain scale rating of 0-10.  She reports that any lifting, bending or prolonged sitting increases her pain.  Patient has difficulty taking anti-inflammatory medication due to stomach irritation.   Patient is currently maintained on Norco 10/325 mg 1 by mouth every 6 hours when necessary.  Patient reports that current medication regimen does decrease her pain for a few hours allowing her to be more active in her home and do very mild/light housework.  Patient reports that her pain does interfere with her quality of life as well as ability to do the things that she enjoys.  Patient provides care for her handicapped child which is very physical.  Patient has no history of substance abuse or addiction.  Her pain is described as sharp with radiation into both lower extremities.  The radiation is a tingling burning feeling.  She does receive Neurontin 300 mg 3 times a day which is helpful with her pain and tingling.  Patient denies any side effects.     Depression-patient currently receives Cymbalta.  This is helpful for her fibromyalgia pain as well as her chronic depression.  Patient has depression related to her multiple health problems as well as chronic condition of her only child.  She denies any thoughts of hurting self or others.  She feels Cymbalta is helpful with her symptoms.    The following portions of the patient's history were reviewed and updated as appropriate: allergies, current medications, past family history, past medical history, past social history, past surgical history and problem list.    Review of Systems   Constitutional: Positive for activity change and fatigue. Negative for appetite change, chills and unexpected weight change.   HENT: Negative.    Eyes: Negative.    Respiratory: Negative for cough, choking, chest tightness, shortness of breath and wheezing.    Cardiovascular: Negative for chest pain and palpitations.   Gastrointestinal: Positive for constipation and nausea. Negative for abdominal pain, blood in stool, diarrhea and vomiting.   Endocrine: Negative.    Genitourinary: Positive for frequency. Negative for dysuria, flank pain and menstrual problem.   Musculoskeletal:  "Positive for arthralgias, back pain, gait problem, joint swelling, myalgias and neck pain. Negative for neck stiffness.   Skin: Negative for rash.   Allergic/Immunologic: Negative.    Neurological: Positive for dizziness. Negative for headaches.   Hematological: Negative.    Psychiatric/Behavioral: Negative for dysphoric mood, self-injury and suicidal ideas.       Objective     /65 (BP Location: Left arm, Patient Position: Sitting, Cuff Size: Adult)  Pulse 86  Temp 97.7 °F (36.5 °C) (Oral)   Ht 65\" (165.1 cm)  Wt 176 lb (79.8 kg)  SpO2 98%  BMI 29.29 kg/m2  Office Visit on 09/19/2017   Component Date Value Ref Range Status   • HCG, Urine, QL 09/19/2017 Negative  Negative Final   • Lot Number 09/19/2017 [id number]   Final   • Internal Positive Control 09/19/2017 Positive   Final   • Internal Negative Control 09/19/2017 Negative   Final       Physical Exam   Constitutional: She is oriented to person, place, and time. Vital signs are normal. She appears well-developed and well-nourished.  Non-toxic appearance. She does not have a sickly appearance. She does not appear ill. No distress.   Very pleasant lady.    HENT:   Head: Normocephalic.   Right Ear: External ear normal.   Left Ear: External ear normal.   Nose: Nose normal.   Mouth/Throat: Oropharynx is clear and moist. No oropharyngeal exudate.   Eyes: Pupils are equal, round, and reactive to light.   Neck: Normal range of motion. Neck supple. No tracheal deviation present. No thyromegaly present.   Cardiovascular: Normal rate, regular rhythm and normal heart sounds.  Exam reveals no gallop and no friction rub.    No murmur heard.  Hypotensive   Pulmonary/Chest: Effort normal and breath sounds normal. No respiratory distress. She has no wheezes. She has no rales. She exhibits no tenderness.   Abdominal: Soft. Bowel sounds are normal. She exhibits no distension and no mass. There is no tenderness. There is no rebound and no guarding.   Musculoskeletal:     "    Cervical back: She exhibits bony tenderness and spasm.        Lumbar back: She exhibits decreased range of motion, bony tenderness and spasm.   Palpable pulses bilateral lower extremities.       Vascular Status -  Her exam exhibits right foot edema. Her exam exhibits left foot edema.  Neurological: She is alert and oriented to person, place, and time. She has normal reflexes.   CN 2-12 grossly intact    Skin: Skin is warm and dry. No bruising, no lesion and no rash noted. She is not diaphoretic. No erythema.   Psychiatric: She has a normal mood and affect. Her speech is normal and behavior is normal. Judgment and thought content normal. Cognition and memory are normal.   Vitals reviewed.      Assessment/Plan     Problem List Items Addressed This Visit        Cardiovascular and Mediastinum    Controlled type 2 DM with peripheral circulatory disorder    Hypertension       Digestive    Anemia due to vitamin B12 deficiency    Slow transit constipation - Primary    Current Assessment & Plan     Clinical constipation and flatulence.         Relevant Medications    dicyclomine (BENTYL) 10 MG capsule    Gastroesophageal reflux disease without esophagitis    Current Assessment & Plan     Chronic GERD with history of multiple medication failures.  Patient has been seen by GI and has had an EGD.           Relevant Medications    dicyclomine (BENTYL) 10 MG capsule       Nervous and Auditory    Chronic left shoulder pain       Musculoskeletal and Integument    Osteoarthritis, multiple sites    HNP (herniated nucleus pulposus), lumbar    DDD (degenerative disc disease), lumbar    Relevant Medications    HYDROcodone-acetaminophen (NORCO)  MG per tablet    Bulge of lumbar disc without myelopathy    Overview     Degenerative disc changes in the lumbar disc spaces. There  is a grade 1 spondylolisthesis at L4-5 felt to be secondary to facet  joint arthritis. There is  narrowing of the lateral recesses at L3-4 and  L4-5. The  spinal stenosis is most severe at L4-5. There is a fairly  large disc herniation at L5-S1.            Other    Spinal stenosis, lumbar region, with neurogenic claudication    Overview     Pre-surgical planning w/u         Hx of laparoscopic gastric banding      Other Visit Diagnoses     Status post bariatric surgery        Vitamin D deficiency disease        High risk medication use        Relevant Orders    Urine Drug Screen - Urine, Clean Catch      Fasting labs obtained today.  Refill Norco 10-3 25 mg 1 by mouth every 6-8 hours when necessary #90 with no refill.  B 12 injection today.   Increase fluid intake other than coffee and diet pepsi.  Stop beta blocker and monitor blood pressure. Report any readings over 140/90 or lower than 100/60. Report any heart rate over 100 or less than 60.   Emotional support and active listening provided.   I have discussed diagnosis in detail today allowing time for questions and answers. Pt is aware of reasons to seek urgent or emergent medical care as well as reasons to return to the clinic for evaluation. Possible side effects, interactions and progression of symptoms discussed as well. Pt / family states understanding.   Marques has been reviewed as consistent.  Uds is on file.   Goal: Improved quality of life and reduction in pain as evidenced by pt report.   Patient has been instructed and counseled regarding opioid misuse and risk of addiction.  We have discussed proper storage and disposal of controlled medication.  Body mechanics reviewed.   Follow-up in one month, sooner if needed.       This document has been electronically signed by:  CHARMAINE Doran, NP-C

## 2017-11-28 NOTE — ASSESSMENT & PLAN NOTE
Chronic GERD with history of multiple medication failures.  Patient has been seen by GI and has had an EGD.

## 2017-12-04 RX ORDER — METHOCARBAMOL 750 MG/1
TABLET, FILM COATED ORAL
Qty: 60 TABLET | Refills: 5 | Status: SHIPPED | OUTPATIENT
Start: 2017-12-04 | End: 2017-12-20 | Stop reason: SDUPTHER

## 2017-12-20 ENCOUNTER — OFFICE VISIT (OUTPATIENT)
Dept: FAMILY MEDICINE CLINIC | Facility: CLINIC | Age: 47
End: 2017-12-20

## 2017-12-20 VITALS
DIASTOLIC BLOOD PRESSURE: 80 MMHG | BODY MASS INDEX: 29.32 KG/M2 | SYSTOLIC BLOOD PRESSURE: 130 MMHG | WEIGHT: 176 LBS | TEMPERATURE: 98.5 F | OXYGEN SATURATION: 99 % | HEART RATE: 94 BPM | HEIGHT: 65 IN

## 2017-12-20 DIAGNOSIS — G56.20 CUBITAL TUNNEL SYNDROME, UNSPECIFIED LATERALITY: ICD-10-CM

## 2017-12-20 DIAGNOSIS — D51.3 OTHER DIETARY VITAMIN B12 DEFICIENCY ANEMIA: ICD-10-CM

## 2017-12-20 DIAGNOSIS — M51.26 HNP (HERNIATED NUCLEUS PULPOSUS), LUMBAR: ICD-10-CM

## 2017-12-20 DIAGNOSIS — Z30.42 ENCOUNTER FOR SURVEILLANCE OF INJECTABLE CONTRACEPTIVE: Primary | ICD-10-CM

## 2017-12-20 DIAGNOSIS — J30.1 CHRONIC SEASONAL ALLERGIC RHINITIS DUE TO POLLEN: ICD-10-CM

## 2017-12-20 DIAGNOSIS — F17.200 NICOTINE DEPENDENCE, UNCOMPLICATED, UNSPECIFIED NICOTINE PRODUCT TYPE: ICD-10-CM

## 2017-12-20 DIAGNOSIS — I10 ESSENTIAL HYPERTENSION: ICD-10-CM

## 2017-12-20 DIAGNOSIS — E11.51 CONTROLLED TYPE 2 DM WITH PERIPHERAL CIRCULATORY DISORDER (HCC): ICD-10-CM

## 2017-12-20 DIAGNOSIS — K21.9 GASTROESOPHAGEAL REFLUX DISEASE WITHOUT ESOPHAGITIS: ICD-10-CM

## 2017-12-20 DIAGNOSIS — M51.36 BULGE OF LUMBAR DISC WITHOUT MYELOPATHY: ICD-10-CM

## 2017-12-20 DIAGNOSIS — K59.01 SLOW TRANSIT CONSTIPATION: ICD-10-CM

## 2017-12-20 DIAGNOSIS — M51.36 DDD (DEGENERATIVE DISC DISEASE), LUMBAR: ICD-10-CM

## 2017-12-20 PROCEDURE — 96372 THER/PROPH/DIAG INJ SC/IM: CPT | Performed by: NURSE PRACTITIONER

## 2017-12-20 PROCEDURE — 99214 OFFICE O/P EST MOD 30 MIN: CPT | Performed by: NURSE PRACTITIONER

## 2017-12-20 RX ORDER — MEDROXYPROGESTERONE ACETATE 150 MG/ML
150 INJECTION, SUSPENSION INTRAMUSCULAR
Qty: 1 ML | Refills: 3 | Status: SHIPPED | OUTPATIENT
Start: 2017-12-20 | End: 2019-01-22 | Stop reason: SDUPTHER

## 2017-12-20 RX ORDER — POTASSIUM CHLORIDE 750 MG/1
10 TABLET, EXTENDED RELEASE ORAL 2 TIMES DAILY
Qty: 60 TABLET | Refills: 5 | Status: SHIPPED | OUTPATIENT
Start: 2017-12-20 | End: 2018-03-22 | Stop reason: SDUPTHER

## 2017-12-20 RX ORDER — ESOMEPRAZOLE MAGNESIUM 40 MG/1
40 CAPSULE, DELAYED RELEASE ORAL
Qty: 30 CAPSULE | Refills: 5 | Status: SHIPPED | OUTPATIENT
Start: 2017-12-20 | End: 2018-06-19 | Stop reason: SDUPTHER

## 2017-12-20 RX ORDER — FLUTICASONE PROPIONATE 50 MCG
1 SPRAY, SUSPENSION (ML) NASAL DAILY
Qty: 1 BOTTLE | Refills: 5 | Status: SHIPPED | OUTPATIENT
Start: 2017-12-20 | End: 2018-07-24 | Stop reason: SDUPTHER

## 2017-12-20 RX ORDER — DICLOFENAC SODIUM 75 MG/1
75 TABLET, DELAYED RELEASE ORAL 2 TIMES DAILY
Qty: 60 TABLET | Refills: 5 | Status: SHIPPED | OUTPATIENT
Start: 2017-12-20 | End: 2018-06-19 | Stop reason: SDUPTHER

## 2017-12-20 RX ORDER — HYDROCODONE BITARTRATE AND ACETAMINOPHEN 10; 325 MG/1; MG/1
1 TABLET ORAL EVERY 6 HOURS PRN
Qty: 90 TABLET | Refills: 0 | Status: SHIPPED | OUTPATIENT
Start: 2017-12-20 | End: 2018-01-23 | Stop reason: SDUPTHER

## 2017-12-20 RX ORDER — ERGOCALCIFEROL 1.25 MG/1
50000 CAPSULE ORAL
Qty: 4 CAPSULE | Refills: 5 | Status: SHIPPED | OUTPATIENT
Start: 2017-12-20 | End: 2018-08-27 | Stop reason: SDUPTHER

## 2017-12-20 RX ORDER — METHOCARBAMOL 750 MG/1
750 TABLET, FILM COATED ORAL 2 TIMES DAILY
Qty: 60 TABLET | Refills: 5 | Status: SHIPPED | OUTPATIENT
Start: 2017-12-20 | End: 2018-09-25 | Stop reason: SDUPTHER

## 2017-12-20 RX ORDER — HYDROCHLOROTHIAZIDE 25 MG/1
25 TABLET ORAL DAILY
Qty: 30 TABLET | Refills: 5 | Status: SHIPPED | OUTPATIENT
Start: 2017-12-20 | End: 2018-07-24 | Stop reason: SDUPTHER

## 2017-12-20 RX ORDER — NEOMYCIN/POLYMYXIN B/PRAMOXINE 3.5-10K-1
2 CREAM (GRAM) TOPICAL DAILY
Qty: 100 TABLET | Refills: 5 | Status: SHIPPED | OUTPATIENT
Start: 2017-12-20 | End: 2018-12-26 | Stop reason: SDUPTHER

## 2017-12-20 RX ORDER — DULOXETIN HYDROCHLORIDE 60 MG/1
60 CAPSULE, DELAYED RELEASE ORAL DAILY
Qty: 30 CAPSULE | Refills: 5
Start: 2017-12-20 | End: 2018-02-22 | Stop reason: SDUPTHER

## 2017-12-20 RX ORDER — POTASSIUM CHLORIDE 750 MG/1
TABLET, EXTENDED RELEASE ORAL
COMMUNITY
Start: 2017-11-20 | End: 2017-12-20 | Stop reason: SDUPTHER

## 2017-12-20 RX ORDER — LEVOCETIRIZINE DIHYDROCHLORIDE 5 MG/1
5 TABLET, FILM COATED ORAL EVERY EVENING
Qty: 30 TABLET | Refills: 5 | Status: SHIPPED | OUTPATIENT
Start: 2017-12-20 | End: 2018-06-19 | Stop reason: SDUPTHER

## 2017-12-20 RX ORDER — NIZATIDINE 150 MG/1
150 CAPSULE ORAL 2 TIMES DAILY
Qty: 60 CAPSULE | Refills: 5 | Status: SHIPPED | OUTPATIENT
Start: 2017-12-20 | End: 2018-06-19 | Stop reason: SDUPTHER

## 2017-12-20 RX ORDER — SIMVASTATIN 20 MG
20 TABLET ORAL NIGHTLY
Qty: 30 TABLET | Refills: 5 | Status: SHIPPED | OUTPATIENT
Start: 2017-12-20 | End: 2018-06-19 | Stop reason: SDUPTHER

## 2017-12-20 RX ADMIN — CYANOCOBALAMIN 1000 MCG: 1000 INJECTION, SOLUTION INTRAMUSCULAR; SUBCUTANEOUS at 12:56

## 2017-12-20 NOTE — PROGRESS NOTES
Subjective   Mirian Fischer is a 47 y.o. female.     Chief Complaint   Patient presents with   • Med Refill   • Osteoarthritis       History of Present Illness     Patient is here to follow-up on multiple chronic disease processes.  Patient reports that her stomach irritation is improving now that she is off of metformin.  She reports that the Bentyl has helped with her bloating and GI discomfort.  There has been no increase in her constipation while on Bentyl.    Patient is requesting refills on multiple medications.    Medical history is significant for type 2 diabetes, hypertension, diverticulitis, anemia, colovaginal fistula with repair, constipation, GERD, chronic left shoulder pain, carpal tunnel syndrome, osteoarthritis, herniated nucleus pulposus in the lumbar region and stress incontinence.      Contraception-patient is requesting her Depo-Provera injection.  She reports she has not had sex in over one year.  She uses Depo-Provera for menstrual regulation.    Status post bariatric surgery-lap band surgery has been successful with good patient weight loss.  She denies side effects.     Chronic degenerative disc disease with lumbar stenosis  Chronic low back pain which has been present for greater than 5 years.  Patient has consulted at neurosurgeon and orthopedic consult in the past.  She has had joint injections in the past.  Patient has attempted physical therapy which created increased pain.  Recent radiology is on file.  Most recent MRI does show degenerative disc changes in the lumbar region with spondylolithiasis at the L4/5.  There is narrowing of the lateral recesses and L3/4 and L4/5.  Spinal stenosis is most severe at L4/5.  There is a large herniation at L4/S1.  Patient rates her pain today as 3 on pain scale rating of 0-10.  She reports that any lifting, bending or prolonged sitting increases her pain.  Patient has difficulty taking anti-inflammatory medication due to stomach irritation.   Patient is currently maintained on Norco 10/325 mg 1 by mouth every 6 hours when necessary.  Patient also receives diclofenac. Patient reports that current medication regimen does decrease her pain for a few hours allowing her to be more active in her home and do very mild/light housework.  Patient reports that her pain does interfere with her quality of life as well as ability to do the things that she enjoys.  Patient provides care for her handicapped child which is very physical.  Patient has no history of substance abuse or addiction.  Her pain is described as sharp with radiation into both lower extremities.  The radiation is a tingling burning feeling.  She does receive Neurontin 300 mg 3 times a day which is helpful with her pain and tingling.  Patient denies any side effects.      B-12 deficiency-requesting B12 injection today.    Type 2 diabetes-no exacerbation with discontinuation of metformin.  Continues to check her blood sugar randomly.      The following portions of the patient's history were reviewed and updated as appropriate: allergies, current medications, past family history, past medical history, past social history, past surgical history and problem list.    Review of Systems   Constitutional: Negative for activity change, appetite change, chills, fatigue and fever.   HENT: Negative for ear pain, facial swelling, hearing loss, sinus pressure, sore throat, trouble swallowing and voice change.    Eyes: Negative for pain, discharge and visual disturbance.   Respiratory: Negative for apnea, cough, chest tightness, shortness of breath and wheezing.    Cardiovascular: Negative for chest pain, palpitations and leg swelling.   Gastrointestinal: Negative for abdominal distention, abdominal pain, blood in stool, nausea and vomiting.   Genitourinary: Negative for dysuria and menstrual problem.   Musculoskeletal: Positive for arthralgias and back pain. Negative for neck stiffness.   Skin: Negative for  "color change.   Neurological: Negative for headaches.   Psychiatric/Behavioral: Negative for confusion and suicidal ideas. The patient is not nervous/anxious.    All other systems reviewed and are negative.      Objective     /80  Pulse 94  Temp 98.5 °F (36.9 °C) (Temporal Artery )   Ht 165.1 cm (65\")  Wt 79.8 kg (176 lb)  SpO2 99%  BMI 29.29 kg/m2  Office Visit on 11/28/2017   Component Date Value Ref Range Status   • WBC 11/28/2017 8.64  4.50 - 12.50 10*3/mm3 Final   • RBC 11/28/2017 5.17  4.20 - 5.40 10*6/mm3 Final   • Hemoglobin 11/28/2017 14.4  12.0 - 16.0 g/dL Final   • Hematocrit 11/28/2017 45.0  37.0 - 47.0 % Final   • MCV 11/28/2017 87.0  80.0 - 94.0 fL Final   • MCH 11/28/2017 27.9  27.0 - 33.0 pg Final   • MCHC 11/28/2017 32.0* 33.0 - 37.0 g/dL Final   • RDW 11/28/2017 17.3* 11.5 - 14.5 % Final   • Platelets 11/28/2017 308  130 - 400 10*3/mm3 Final   • Neutrophil Rel % 11/28/2017 56.3  30.0 - 70.0 % Final   • Lymphocyte Rel % 11/28/2017 30.4  21.0 - 51.0 % Final   • Monocyte Rel % 11/28/2017 7.4  0.0 - 10.0 % Final   • Eosinophil Rel % 11/28/2017 5.1* 0.0 - 5.0 % Final   • Basophil Rel % 11/28/2017 0.7  0.0 - 2.0 % Final   • Neutrophils Absolute 11/28/2017 4.86  1.40 - 6.50 10*3/mm3 Final   • Lymphocytes Absolute 11/28/2017 2.63  1.00 - 3.00 10*3/mm3 Final   • Monocytes Absolute 11/28/2017 0.64  0.10 - 0.90 10*3/mm3 Final   • Eosinophils Absolute 11/28/2017 0.44  0.00 - 0.70 10*3/mm3 Final   • Basophils Absolute 11/28/2017 0.06  0.00 - 0.30 10*3/mm3 Final   • Immature Granulocyte Rel % 11/28/2017 0.1  0.0 - 0.5 % Final   • Immature Grans Absolute 11/28/2017 0.01  0.00 - 0.03 10*3/mm3 Final   • Glucose 11/28/2017 91  70 - 110 mg/dL Final   • BUN 11/28/2017 6* 7 - 21 mg/dL Final   • Creatinine 11/28/2017 0.50  0.43 - 1.29 mg/dL Final   • eGFR Non African Am 11/28/2017 132  >60 mL/min/1.73 Final   • eGFR African Am 11/28/2017 >150  >60 mL/min/1.73 Final   • BUN/Creatinine Ratio 11/28/2017 12.0  " 7.0 - 25.0 Final   • Sodium 11/28/2017 140  135 - 153 mmol/L Final   • Potassium 11/28/2017 3.8  3.5 - 5.3 mmol/L Final   • Chloride 11/28/2017 107  99 - 112 mmol/L Final   • Total CO2 11/28/2017 28.9  24.3 - 31.9 mmol/L Final   • Calcium 11/28/2017 9.2  7.7 - 10.0 mg/dL Final   • Total Protein 11/28/2017 6.1  6.0 - 8.0 g/dL Final   • Albumin 11/28/2017 4.00  3.50 - 5.00 g/dL Final   • Globulin 11/28/2017 2.1  gm/dL Final   • A/G Ratio 11/28/2017 1.9  1.5 - 2.5 g/dL Final   • Total Bilirubin 11/28/2017 0.3  0.2 - 1.8 mg/dL Final   • Alkaline Phosphatase 11/28/2017 87  35 - 104 U/L Final   • AST (SGOT) 11/28/2017 19  10 - 30 U/L Final   • ALT (SGPT) 11/28/2017 32  10 - 36 U/L Final   • TSH 11/28/2017 0.989  0.550 - 4.780 mIU/mL Final   • Hemoglobin A1C 11/28/2017 6.10* 4.50 - 5.70 % Final   • 25 Hydroxy, Vitamin D 11/28/2017 62.0  ng/mL Final   • Total Cholesterol 11/28/2017 204* 0 - 200 mg/dL Final   • Triglycerides 11/28/2017 216* 0 - 150 mg/dL Final   • HDL Cholesterol 11/28/2017 43* 60 - 100 mg/dL Final   • VLDL Cholesterol 11/28/2017 43.2  mg/dL Final   • LDL Cholesterol  11/28/2017 118* 0 - 100 mg/dL Final   • Vitamin B-12 11/28/2017 556  211 - 911 pg/mL Final       Physical Exam   Constitutional: She is oriented to person, place, and time. Vital signs are normal. She appears well-developed and well-nourished.  Non-toxic appearance. She does not have a sickly appearance. She does not appear ill. No distress.   Very pleasant lady.    HENT:   Head: Normocephalic.   Right Ear: External ear normal.   Left Ear: External ear normal.   Nose: Nose normal.   Mouth/Throat: Oropharynx is clear and moist. No oropharyngeal exudate.   Eyes: Pupils are equal, round, and reactive to light.   Neck: Normal range of motion. Neck supple. No tracheal deviation present. No thyromegaly present.   Cardiovascular: Normal rate, regular rhythm and normal heart sounds.  Exam reveals no gallop and no friction rub.    No murmur  heard.  Pulmonary/Chest: Effort normal and breath sounds normal. No respiratory distress. She has no wheezes. She has no rales. She exhibits no tenderness.       Abdominal: Soft. Bowel sounds are normal. She exhibits no distension and no mass. There is no tenderness. There is no rebound and no guarding.   Musculoskeletal:        Cervical back: She exhibits bony tenderness and spasm.        Lumbar back: She exhibits decreased range of motion, bony tenderness and spasm.   Palpable pulses bilateral lower extremities.       Vascular Status -  Her exam exhibits right foot edema. Her exam exhibits left foot edema.  Neurological: She is alert and oriented to person, place, and time. She has normal reflexes.   CN 2-12 grossly intact    Skin: Skin is warm and dry. No bruising, no lesion and no rash noted. She is not diaphoretic. No erythema.   Psychiatric: She has a normal mood and affect. Her speech is normal and behavior is normal. Judgment and thought content normal. Cognition and memory are normal.   Vitals reviewed.      Assessment/Plan     Problem List Items Addressed This Visit        Cardiovascular and Mediastinum    Controlled type 2 DM with peripheral circulatory disorder    Hypertension    Relevant Medications    hydrochlorothiazide (HYDRODIURIL) 25 MG tablet       Digestive    Anemia due to vitamin B12 deficiency    Slow transit constipation    Gastroesophageal reflux disease without esophagitis    Relevant Medications    nizatidine (AXID) 150 MG capsule    esomeprazole (nexIUM) 40 MG capsule       Nervous and Auditory    Cubital tunnel syndrome       Musculoskeletal and Integument    HNP (herniated nucleus pulposus), lumbar    DDD (degenerative disc disease), lumbar    Relevant Medications    HYDROcodone-acetaminophen (NORCO)  MG per tablet    Other Relevant Orders    XR sternum pa and lateral    Bulge of lumbar disc without myelopathy    Overview     Degenerative disc changes in the lumbar disc spaces.  There  is a grade 1 spondylolisthesis at L4-5 felt to be secondary to facet  joint arthritis. There is  narrowing of the lateral recesses at L3-4 and  L4-5. The spinal stenosis is most severe at L4-5. There is a fairly  large disc herniation at L5-S1.            Other    Encounter for contraceptive management - Primary    Relevant Orders    POC Pregnancy, Urine    Nicotine addiction      Other Visit Diagnoses     Chronic seasonal allergic rhinitis due to pollen        symptomatic treatment, steam therapy, fluids, stop smoking.     Relevant Medications    fluticasone (FLONASE) 50 MCG/ACT nasal spray          Current Outpatient Prescriptions:   •  diclofenac (VOLTAREN) 75 MG EC tablet, Take 1 tablet by mouth 2 (Two) Times a Day., Disp: 60 tablet, Rfl: 5  •  dicyclomine (BENTYL) 10 MG capsule, Take 1 capsule by mouth 4 (Four) Times a Day Before Meals & at Bedtime., Disp: 120 capsule, Rfl: 5  •  DULoxetine (CYMBALTA) 60 MG capsule, Take 1 capsule by mouth Daily., Disp: 30 capsule, Rfl: 5  •  esomeprazole (nexIUM) 40 MG capsule, Take 1 capsule by mouth Every Morning Before Breakfast., Disp: 30 capsule, Rfl: 5  •  fluticasone (FLONASE) 50 MCG/ACT nasal spray, 1 spray into each nostril Daily. Administer 1 sprays in each nostril for each dose., Disp: 1 bottle, Rfl: 5  •  gabapentin (NEURONTIN) 300 MG capsule, Take 1 capsule by mouth 3 (Three) Times a Day., Disp: 90 capsule, Rfl: 2  •  hydrochlorothiazide (HYDRODIURIL) 25 MG tablet, Take 1 tablet by mouth Daily., Disp: 30 tablet, Rfl: 5  •  HYDROcodone-acetaminophen (NORCO)  MG per tablet, Take 1 tablet by mouth Every 6 (Six) Hours As Needed for Moderate Pain ., Disp: 90 tablet, Rfl: 0  •  levocetirizine (XYZAL) 5 MG tablet, Take 1 tablet by mouth Every Evening., Disp: 30 tablet, Rfl: 5  •  linaclotide (LINZESS) 290 MCG capsule capsule, Take 1 capsule by mouth Every Morning Before Breakfast., Disp: 30 capsule, Rfl: 5  •  medroxyPROGESTERone (DEPO-PROVERA) 150 MG/ML  injection, Inject 1 mL into the shoulder, thigh, or buttocks Every 3 (Three) Months., Disp: 1 mL, Rfl: 3  •  methocarbamol (ROBAXIN) 750 MG tablet, Take 1 tablet by mouth 2 (Two) Times a Day., Disp: 60 tablet, Rfl: 5  •  Multiple Vitamins-Minerals (MULTI-VITAMIN GUMMIES) chewable tablet, Chew 2 each Daily., Disp: 100 tablet, Rfl: 5  •  nizatidine (AXID) 150 MG capsule, Take 1 capsule by mouth 2 (Two) Times a Day., Disp: 60 capsule, Rfl: 5  •  ondansetron (ZOFRAN) 4 MG tablet, Take 4 mg by mouth Every 6 (Six) Hours As Needed for Nausea or Vomiting., Disp: , Rfl:   •  potassium chloride (K-DUR) 10 MEQ CR tablet, Take 1 tablet by mouth 2 (Two) Times a Day., Disp: 60 tablet, Rfl: 5  •  potassium chloride (K-DUR,KLOR-CON) 10 MEQ CR tablet, Take 1 tablet by mouth 2 (Two) Times a Day., Disp: 60 tablet, Rfl: 5  •  simvastatin (ZOCOR) 20 MG tablet, Take 1 tablet by mouth Every Night., Disp: 30 tablet, Rfl: 5  •  vitamin D (ERGOCALCIFEROL) 82770 units capsule capsule, Take 1 capsule by mouth Every 7 (Seven) Days., Disp: 4 capsule, Rfl: 5    Current Facility-Administered Medications:   •  cyanocobalamin injection 1,000 mcg, 1,000 mcg, Intramuscular, Q28 Days, CHARMAINE Dejesus, 1,000 mcg at 12/20/17 1256    Order for  sternal  Xray prior to follow-up visit in one month.  Marques has been reviewed as consistent.  Uds is on file.   Goal: Improved quality of life and reduction in pain as evidenced by pt report.   Patient has been instructed and counseled regarding opioid misuse and risk of addiction.  We have discussed proper storage and disposal of controlled medication.  As part of this patient's treatment plan they are being prescribed controlled substance/substances with potential for abuse. This patient has been made aware of the appropriate use of such medications, including potential risk of somnolence, limited ability to drive and / or work safely, and potential for overdose. It has also been made clear that  these medications are for use by this patient only, without concomitant use of alcohol or other substances unless prescribed/advised by medical provider. Patient has completed controlled substance agreement detailing terms of continued prescribing of controlled substances including monitoring Marques reports, drug screens and pill counts. The patient was asked and states they are not receiving narcotic medication from any there provider or any provider that this office has not been made aware of. History and physical exam exhibit continued safe and appropriate use of controlled substances.   I have discussed diagnosis in detail today allowing time for questions and answers. Pt is aware of reasons to seek urgent or emergent medical care as well as reasons to return to the clinic for evaluation. Possible side effects, interactions and progression of symptoms discussed as well. Pt / family states understanding.   Emotional support and active listening provided.   B-12 injection today for standing order.  Pickup a Depo-Provera injection and bring back for administration.  Urine pregnancy negative.  Review of previous MRI results.  I have reviewed recent lab results and discussed with patient.  Follow-up in one month, sooner if needed.         This document has been electronically signed by:  CHARMAINE oDran, NP-C

## 2018-01-23 ENCOUNTER — OFFICE VISIT (OUTPATIENT)
Dept: FAMILY MEDICINE CLINIC | Facility: CLINIC | Age: 48
End: 2018-01-23

## 2018-01-23 VITALS
DIASTOLIC BLOOD PRESSURE: 98 MMHG | HEART RATE: 84 BPM | OXYGEN SATURATION: 97 % | BODY MASS INDEX: 28.82 KG/M2 | HEIGHT: 65 IN | WEIGHT: 173 LBS | SYSTOLIC BLOOD PRESSURE: 162 MMHG | TEMPERATURE: 98.1 F

## 2018-01-23 DIAGNOSIS — Z79.899 HIGH RISK MEDICATION USE: Primary | ICD-10-CM

## 2018-01-23 DIAGNOSIS — N82.4 COLOVAGINAL FISTULA: ICD-10-CM

## 2018-01-23 DIAGNOSIS — Z30.42 ENCOUNTER FOR SURVEILLANCE OF INJECTABLE CONTRACEPTIVE: ICD-10-CM

## 2018-01-23 DIAGNOSIS — E11.51 CONTROLLED TYPE 2 DM WITH PERIPHERAL CIRCULATORY DISORDER (HCC): ICD-10-CM

## 2018-01-23 DIAGNOSIS — Z01.419 SMEAR, VAGINAL, AS PART OF ROUTINE GYNECOLOGICAL EXAMINATION: ICD-10-CM

## 2018-01-23 DIAGNOSIS — R15.9 URINARY AND BOWEL INCONTINENCE: ICD-10-CM

## 2018-01-23 DIAGNOSIS — M51.36 BULGE OF LUMBAR DISC WITHOUT MYELOPATHY: ICD-10-CM

## 2018-01-23 DIAGNOSIS — Z72.0 TOBACCO USE: ICD-10-CM

## 2018-01-23 DIAGNOSIS — M48.062 SPINAL STENOSIS, LUMBAR REGION, WITH NEUROGENIC CLAUDICATION: ICD-10-CM

## 2018-01-23 DIAGNOSIS — M51.36 DDD (DEGENERATIVE DISC DISEASE), LUMBAR: ICD-10-CM

## 2018-01-23 DIAGNOSIS — N39.3 STRESS INCONTINENCE: ICD-10-CM

## 2018-01-23 DIAGNOSIS — F17.210 CIGARETTE NICOTINE DEPENDENCE WITHOUT COMPLICATION: ICD-10-CM

## 2018-01-23 DIAGNOSIS — Z12.72 SMEAR, VAGINAL, AS PART OF ROUTINE GYNECOLOGICAL EXAMINATION: ICD-10-CM

## 2018-01-23 DIAGNOSIS — K21.9 GASTROESOPHAGEAL REFLUX DISEASE WITHOUT ESOPHAGITIS: ICD-10-CM

## 2018-01-23 DIAGNOSIS — K59.01 SLOW TRANSIT CONSTIPATION: ICD-10-CM

## 2018-01-23 DIAGNOSIS — M15.9 PRIMARY OSTEOARTHRITIS INVOLVING MULTIPLE JOINTS: ICD-10-CM

## 2018-01-23 DIAGNOSIS — Z12.39 SCREENING BREAST EXAMINATION: ICD-10-CM

## 2018-01-23 DIAGNOSIS — I10 ESSENTIAL HYPERTENSION: ICD-10-CM

## 2018-01-23 DIAGNOSIS — M51.26 HNP (HERNIATED NUCLEUS PULPOSUS), LUMBAR: ICD-10-CM

## 2018-01-23 DIAGNOSIS — R32 URINARY AND BOWEL INCONTINENCE: ICD-10-CM

## 2018-01-23 DIAGNOSIS — Z12.31 ENCOUNTER FOR MAMMOGRAM TO ESTABLISH BASELINE MAMMOGRAM: Primary | ICD-10-CM

## 2018-01-23 DIAGNOSIS — G56.20 CUBITAL TUNNEL SYNDROME, UNSPECIFIED LATERALITY: ICD-10-CM

## 2018-01-23 DIAGNOSIS — D51.3 OTHER DIETARY VITAMIN B12 DEFICIENCY ANEMIA: ICD-10-CM

## 2018-01-23 DIAGNOSIS — Z12.39 ENCOUNTER FOR OTHER SCREENING FOR MALIGNANT NEOPLASM OF BREAST: ICD-10-CM

## 2018-01-23 LAB
B-HCG UR QL: NEGATIVE
INTERNAL NEGATIVE CONTROL: NORMAL
INTERNAL POSITIVE CONTROL: NORMAL
Lab: NORMAL

## 2018-01-23 PROCEDURE — 81025 URINE PREGNANCY TEST: CPT | Performed by: NURSE PRACTITIONER

## 2018-01-23 PROCEDURE — 87070 CULTURE OTHR SPECIMN AEROBIC: CPT | Performed by: NURSE PRACTITIONER

## 2018-01-23 PROCEDURE — 96372 THER/PROPH/DIAG INJ SC/IM: CPT | Performed by: NURSE PRACTITIONER

## 2018-01-23 PROCEDURE — 87205 SMEAR GRAM STAIN: CPT | Performed by: NURSE PRACTITIONER

## 2018-01-23 PROCEDURE — 99215 OFFICE O/P EST HI 40 MIN: CPT | Performed by: NURSE PRACTITIONER

## 2018-01-23 RX ORDER — HYDROCODONE BITARTRATE AND ACETAMINOPHEN 10; 325 MG/1; MG/1
1 TABLET ORAL EVERY 6 HOURS PRN
Qty: 90 TABLET | Refills: 0 | Status: SHIPPED | OUTPATIENT
Start: 2018-01-23 | End: 2018-02-22 | Stop reason: SDUPTHER

## 2018-01-23 RX ORDER — VARENICLINE TARTRATE 1 MG/1
TABLET, FILM COATED ORAL
COMMUNITY
Start: 2017-10-17 | End: 2018-08-27

## 2018-01-23 RX ORDER — ATENOLOL 25 MG/1
TABLET ORAL
COMMUNITY
Start: 2017-12-20 | End: 2018-01-23 | Stop reason: SDUPTHER

## 2018-01-23 RX ORDER — LISINOPRIL 10 MG/1
10 TABLET ORAL DAILY
Qty: 30 TABLET | Refills: 5 | Status: SHIPPED | OUTPATIENT
Start: 2018-01-23 | End: 2018-07-24 | Stop reason: SDUPTHER

## 2018-01-23 RX ORDER — ATENOLOL 25 MG/1
25 TABLET ORAL DAILY
Qty: 30 TABLET | Refills: 5 | Status: SHIPPED | OUTPATIENT
Start: 2018-01-23 | End: 2018-07-24 | Stop reason: SDUPTHER

## 2018-01-23 RX ORDER — MEDROXYPROGESTERONE ACETATE 150 MG/ML
150 INJECTION, SUSPENSION INTRAMUSCULAR ONCE
Status: COMPLETED | OUTPATIENT
Start: 2018-01-23 | End: 2018-01-23

## 2018-01-23 RX ORDER — GABAPENTIN 300 MG/1
300 CAPSULE ORAL 3 TIMES DAILY
Qty: 90 CAPSULE | Refills: 2 | Status: SHIPPED | OUTPATIENT
Start: 2018-01-23 | End: 2018-04-24 | Stop reason: SDUPTHER

## 2018-01-23 RX ADMIN — CYANOCOBALAMIN 1000 MCG: 1000 INJECTION, SOLUTION INTRAMUSCULAR; SUBCUTANEOUS at 11:09

## 2018-01-23 RX ADMIN — MEDROXYPROGESTERONE ACETATE 150 MG: 150 INJECTION, SUSPENSION INTRAMUSCULAR at 11:29

## 2018-01-23 NOTE — PROGRESS NOTES
Subjective   Mirian Fischer is a 47 y.o. female.     Chief Complaint   Patient presents with   • Gynecologic Exam       History of Present Illness       Hypertension - pt states that she has not been taking her water pill or atenolol. She did stop Lisinopril due to some low blood pressure readings and discontinuation of Lisinopril. She is not sure if she has been taking her hydrochlorothiazide or not.    Constipation-bloating and constipation are somewhat improved with current medication regimen.    B-12 deficiency anemia-monthly B12 injections are helping with fatigue.    GERD-patient has some food intolerance.  This is worse with spicy foods or gassy foods.  She understands the risk of PPI and H2 blockers and wishes to continue on her current medication regimen.    Birth control-patient is here today requesting her Depo-Provera injection.  She is not sexually active at this time.  Depo-Provera injections help decrease the length and severity of her periods.  Patient does understand the possible side effects.      Pap - last pap several years ago. History of UTI and colovaginal fistula repair last year.    Chronic degenerative disc disease with lumbar stenosis  Chronic low back pain which has been present for greater than 5 years.  Patient has a consult at neurosurgeon and orthopedic consult in the past.  She has had joint injections in the past.  Patient has attempted physical therapy which created increased pain.  Recent radiology is on file.  Most recent MRI does show degenerative disc changes in the lumbar region with spondylolithiasis at the L4/5.  There is narrowing of the lateral recesses and L3/4 and L4/5.  Spinal stenosis is most severe at L4/5.  There is a large herniation at L4/S1.  Patient rates her pain today as 7 on pain scale rating of 0-10.  She reports that any lifting, bending or prolonged sitting increases her pain.  Patient has difficulty taking anti-inflammatory medication due to stomach  irritation.  Patient is currently maintained on Norco 10/325 mg 1 by mouth every 6 hours when necessary.  Patient reports that thisher pain for a few hours allowing her to be more active in her home and do very mild/light housework.  Patient reports that her pain does interfere with her quality of life as well as ability to do the things that she enjoys.  Patient provides care for her handicapped child which is very physical.  Patient has no history of substance abuse or addiction.  Her pain is described as sharp with radiation into both lower extremities.  The radiation is a tingling burning feeling.  She does receive Neurontin 300 mg 3 times a day which is helpful with her pain and tingling.  Patient denies any side effects.    Type 2 diabetes-controlled with weight loss and diet.    History is significant for diverticulitis of the large intestine with perforation, GERD, slow transit constipation, hypertension, colovaginal fistula, chronic left shoulder pain, cubital tunnel syndrome, lumbar disc bulge, degenerative disc disease, herniated nucleus pulposus of the lumbar spine, osteoarthritis multiple sites, stress incontinence, depression, heavy painful periods, high risk medication use, bariatric surgery, tobacco addiction, spinal stenosis of the lumbar region with neurogenic claudication.      The following portions of the patient's history were reviewed and updated as appropriate: allergies, current medications, past family history, past medical history, past social history, past surgical history and problem list.    Review of Systems   Constitutional: Positive for fatigue. Negative for chills, diaphoresis and unexpected weight change.   HENT: Negative.    Eyes: Negative.    Respiratory: Negative for cough, shortness of breath and wheezing.    Cardiovascular: Positive for leg swelling (feet swell when in one postion for prolonged time ). Negative for chest pain.   Gastrointestinal: Positive for constipation.  "Negative for abdominal pain, blood in stool, diarrhea, nausea and vomiting.        Bloating post meals and after eating bread or drinking soda    Endocrine: Negative.    Genitourinary: Positive for frequency and urgency. Negative for dysuria.   Musculoskeletal: Positive for arthralgias, back pain, myalgias and neck pain. Negative for neck stiffness.   Skin: Negative for rash and wound.   Allergic/Immunologic: Negative.    Neurological: Negative.    Psychiatric/Behavioral: Negative for decreased concentration, self-injury and suicidal ideas.   All other systems reviewed and are negative.      Objective     /98  Pulse 84  Temp 98.1 °F (36.7 °C) (Temporal Artery )   Ht 165.1 cm (65\")  Wt 78.5 kg (173 lb)  SpO2 97%  BMI 28.79 kg/m2  Office Visit on 11/28/2017   Component Date Value Ref Range Status   • WBC 11/28/2017 8.64  4.50 - 12.50 10*3/mm3 Final   • RBC 11/28/2017 5.17  4.20 - 5.40 10*6/mm3 Final   • Hemoglobin 11/28/2017 14.4  12.0 - 16.0 g/dL Final   • Hematocrit 11/28/2017 45.0  37.0 - 47.0 % Final   • MCV 11/28/2017 87.0  80.0 - 94.0 fL Final   • MCH 11/28/2017 27.9  27.0 - 33.0 pg Final   • MCHC 11/28/2017 32.0* 33.0 - 37.0 g/dL Final   • RDW 11/28/2017 17.3* 11.5 - 14.5 % Final   • Platelets 11/28/2017 308  130 - 400 10*3/mm3 Final   • Neutrophil Rel % 11/28/2017 56.3  30.0 - 70.0 % Final   • Lymphocyte Rel % 11/28/2017 30.4  21.0 - 51.0 % Final   • Monocyte Rel % 11/28/2017 7.4  0.0 - 10.0 % Final   • Eosinophil Rel % 11/28/2017 5.1* 0.0 - 5.0 % Final   • Basophil Rel % 11/28/2017 0.7  0.0 - 2.0 % Final   • Neutrophils Absolute 11/28/2017 4.86  1.40 - 6.50 10*3/mm3 Final   • Lymphocytes Absolute 11/28/2017 2.63  1.00 - 3.00 10*3/mm3 Final   • Monocytes Absolute 11/28/2017 0.64  0.10 - 0.90 10*3/mm3 Final   • Eosinophils Absolute 11/28/2017 0.44  0.00 - 0.70 10*3/mm3 Final   • Basophils Absolute 11/28/2017 0.06  0.00 - 0.30 10*3/mm3 Final   • Immature Granulocyte Rel % 11/28/2017 0.1  0.0 - 0.5 % " Final   • Immature Grans Absolute 11/28/2017 0.01  0.00 - 0.03 10*3/mm3 Final   • Glucose 11/28/2017 91  70 - 110 mg/dL Final   • BUN 11/28/2017 6* 7 - 21 mg/dL Final   • Creatinine 11/28/2017 0.50  0.43 - 1.29 mg/dL Final   • eGFR Non African Am 11/28/2017 132  >60 mL/min/1.73 Final   • eGFR African Am 11/28/2017 >150  >60 mL/min/1.73 Final   • BUN/Creatinine Ratio 11/28/2017 12.0  7.0 - 25.0 Final   • Sodium 11/28/2017 140  135 - 153 mmol/L Final   • Potassium 11/28/2017 3.8  3.5 - 5.3 mmol/L Final   • Chloride 11/28/2017 107  99 - 112 mmol/L Final   • Total CO2 11/28/2017 28.9  24.3 - 31.9 mmol/L Final   • Calcium 11/28/2017 9.2  7.7 - 10.0 mg/dL Final   • Total Protein 11/28/2017 6.1  6.0 - 8.0 g/dL Final   • Albumin 11/28/2017 4.00  3.50 - 5.00 g/dL Final   • Globulin 11/28/2017 2.1  gm/dL Final   • A/G Ratio 11/28/2017 1.9  1.5 - 2.5 g/dL Final   • Total Bilirubin 11/28/2017 0.3  0.2 - 1.8 mg/dL Final   • Alkaline Phosphatase 11/28/2017 87  35 - 104 U/L Final   • AST (SGOT) 11/28/2017 19  10 - 30 U/L Final   • ALT (SGPT) 11/28/2017 32  10 - 36 U/L Final   • TSH 11/28/2017 0.989  0.550 - 4.780 mIU/mL Final   • Hemoglobin A1C 11/28/2017 6.10* 4.50 - 5.70 % Final   • 25 Hydroxy, Vitamin D 11/28/2017 62.0  ng/mL Final   • Total Cholesterol 11/28/2017 204* 0 - 200 mg/dL Final   • Triglycerides 11/28/2017 216* 0 - 150 mg/dL Final   • HDL Cholesterol 11/28/2017 43* 60 - 100 mg/dL Final   • VLDL Cholesterol 11/28/2017 43.2  mg/dL Final   • LDL Cholesterol  11/28/2017 118* 0 - 100 mg/dL Final   • Vitamin B-12 11/28/2017 556  211 - 911 pg/mL Final       Physical Exam   Constitutional: She is oriented to person, place, and time. She appears well-developed and well-nourished. No distress.   Elevated blood pressure.  Verbal and nonverbal signs of pain during prone position and pelvic exam.   HENT:   Head: Normocephalic and atraumatic.   Right Ear: External ear normal.   Left Ear: External ear normal.   Nose: Nose normal.    Mouth/Throat: Oropharynx is clear and moist.   Eyes: EOM are normal. Pupils are equal, round, and reactive to light.   Neck: Normal range of motion. Neck supple. No thyromegaly present.   Cardiovascular: Normal rate, regular rhythm, normal heart sounds and intact distal pulses.    No murmur heard.  Pulmonary/Chest: Effort normal and breath sounds normal. No respiratory distress. She has no wheezes. She has no rales. She exhibits no tenderness. Right breast exhibits no inverted nipple, no mass, no nipple discharge, no skin change and no tenderness. Left breast exhibits no inverted nipple, no mass, no nipple discharge, no skin change and no tenderness.   Loose breast tissue due to rapid weight loss after bariatric surgery.     Multiple scars from history of abscesses on both breast.   Abdominal: Soft. Bowel sounds are normal. She exhibits no distension and no mass. There is no tenderness. There is no rebound and no guarding.   Genitourinary: Vagina normal and uterus normal. Rectal exam shows no external hemorrhoid and anal tone normal. Pelvic exam was performed with patient prone. There is no rash, tenderness, lesion or injury on the right labia. There is no rash, tenderness, lesion or injury on the left labia. Cervix exhibits no discharge and no friability. Right adnexum displays no mass and no tenderness. Left adnexum displays no mass and no tenderness. No vaginal discharge found.   Genitourinary Comments: Normal pelvic/bi-manual exam    Musculoskeletal:        Cervical back: She exhibits decreased range of motion and tenderness.        Thoracic back: She exhibits tenderness and laceration.        Lumbar back: She exhibits tenderness, pain and spasm.   Decreased lumbar curvature, there is pain with forward flexion. DTR's +2. No edema.    Neurological: She is alert and oriented to person, place, and time. She has normal reflexes.   Skin: Skin is warm and dry. No rash noted. No erythema. No pallor.   Psychiatric:  She has a normal mood and affect. Her behavior is normal. Judgment and thought content normal. Her affect is not inappropriate. Cognition and memory are normal.   Denies thoughts of hurting self or others.   Nursing note and vitals reviewed.      Assessment/Plan     Problem List Items Addressed This Visit        Cardiovascular and Mediastinum    Controlled type 2 DM with peripheral circulatory disorder    Hypertension    Relevant Medications    atenolol (TENORMIN) 25 MG tablet    lisinopril (PRINIVIL,ZESTRIL) 10 MG tablet       Digestive    Anemia due to vitamin B12 deficiency    Colovaginal fistula    Slow transit constipation    Gastroesophageal reflux disease without esophagitis       Nervous and Auditory    Cubital tunnel syndrome       Musculoskeletal and Integument    Osteoarthritis, multiple sites    HNP (herniated nucleus pulposus), lumbar    DDD (degenerative disc disease), lumbar    Relevant Medications    HYDROcodone-acetaminophen (NORCO)  MG per tablet    Bulge of lumbar disc without myelopathy    Overview     Degenerative disc changes in the lumbar disc spaces. There  is a grade 1 spondylolisthesis at L4-5 felt to be secondary to facet  joint arthritis. There is  narrowing of the lateral recesses at L3-4 and  L4-5. The spinal stenosis is most severe at L4-5. There is a fairly  large disc herniation at L5-S1.            Genitourinary    Stress incontinence       Other    Spinal stenosis, lumbar region, with neurogenic claudication    Overview     Pre-surgical planning w/u         Tobacco use    Urinary and bowel incontinence    Nicotine addiction    Relevant Medications    CHANTIX CONTINUING MONTH JOHNATHAN 1 MG tablet    Encounter for surveillance of injectable contraceptive    Relevant Medications    MedroxyPROGESTERone Acetate (DEPO-PROVERA) injection 150 mg (Completed)    Other Relevant Orders    POC Pregnancy, Urine    High risk medication use - Primary    Relevant Orders    Urine Drug Screen -  Urine, Clean Catch      Other Visit Diagnoses     Encounter for other screening for malignant neoplasm of breast        Screening breast examination        Smear, vaginal, as part of routine gynecological examination        Relevant Orders    Genital Culture - Swab, Vagina    Liquid-based Pap Smear, Screening - , Cervix          Pelvic and Pap exam performed  Breast exam performed and patient has been instructed on self breast exam monthly.  Administer Depo-Provera injection.  Administer B12 per standing order.  Start lisinopril 10 mg daily and take her daily dose of atenolol as prescribed.  Monitor for symptoms of high blood pressure as discussed today.  Refill Norco 10-3 25 mg 1 by mouth every 6 hours when necessary #90 with no refills.  Refill Neurontin 300 mg 1 by mouth 3 times a day #90 with 2 refills.  Body mechanics reviewed.  Schedule screening mammogram. Referral will not allow me to sign it into this chart due to Epic glitch related to patient's insurance.  Medical assistant JAY Aguilar has been instructed to to schedule patient for her screening mammogram.  Medication list reviewed and discussed.  Multiple diagnoses reviewed and addressed today.  I have encouraged patient to stop smoking.  I have discussed diagnosis in detail today allowing time for questions and answers. Pt is aware of reasons to seek urgent or emergent medical care as well as reasons to return to the clinic for evaluation. Possible side effects, interactions and progression of symptoms discussed as well. Pt / family states understanding.   Emotional support and active listening provided.   Marques has been reviewed as consistent.  Uds is on file.   Goal: Improved quality of life and reduction in pain as evidenced by pt report.   Patient has been instructed and counseled regarding opioid misuse and risk of addiction.  We have discussed proper storage and disposal of controlled medication.  As part of this patient's treatment plan they are  being prescribed controlled substance/substances with potential for abuse. This patient has been made aware of the appropriate use of such medications, including potential risk of somnolence, limited ability to drive and / or work safely, and potential for overdose. It has also been made clear that these medications are for use by this patient only, without concomitant use of alcohol or other substances unless prescribed/advised by medical provider. Patient has completed controlled substance agreement detailing terms of continued prescribing of controlled substances including monitoring Marques reports, drug screens and pill counts. The patient was asked and states they are not receiving narcotic medication from any there provider or any provider that this office has not been made aware of. History and physical exam exhibit continued safe and appropriate use of controlled substances.   Follow up in one month, sooner if needed.   Time face to face today 45 minutes. Counseling provided regarding medication risks and interactions, hypertension medications and preventive testing.              This document has been electronically signed by:  CHARMAINE Doran, NP-C

## 2018-01-26 LAB
BACTERIA SPEC AEROBE CULT: NORMAL
GRAM STN SPEC: NORMAL
GRAM STN SPEC: NORMAL

## 2018-02-22 ENCOUNTER — OFFICE VISIT (OUTPATIENT)
Dept: FAMILY MEDICINE CLINIC | Facility: CLINIC | Age: 48
End: 2018-02-22

## 2018-02-22 VITALS
SYSTOLIC BLOOD PRESSURE: 117 MMHG | DIASTOLIC BLOOD PRESSURE: 70 MMHG | HEIGHT: 65 IN | OXYGEN SATURATION: 98 % | HEART RATE: 80 BPM | TEMPERATURE: 99.1 F | WEIGHT: 171 LBS | BODY MASS INDEX: 28.49 KG/M2

## 2018-02-22 DIAGNOSIS — M51.26 HNP (HERNIATED NUCLEUS PULPOSUS), LUMBAR: Primary | ICD-10-CM

## 2018-02-22 DIAGNOSIS — E53.8 VITAMIN B 12 DEFICIENCY: ICD-10-CM

## 2018-02-22 DIAGNOSIS — M51.36 DDD (DEGENERATIVE DISC DISEASE), LUMBAR: ICD-10-CM

## 2018-02-22 PROCEDURE — 99214 OFFICE O/P EST MOD 30 MIN: CPT | Performed by: NURSE PRACTITIONER

## 2018-02-22 PROCEDURE — 96372 THER/PROPH/DIAG INJ SC/IM: CPT | Performed by: NURSE PRACTITIONER

## 2018-02-22 RX ORDER — HYDROCODONE BITARTRATE AND ACETAMINOPHEN 10; 325 MG/1; MG/1
1 TABLET ORAL EVERY 6 HOURS PRN
Qty: 90 TABLET | Refills: 0 | Status: SHIPPED | OUTPATIENT
Start: 2018-02-22 | End: 2018-03-22 | Stop reason: SDUPTHER

## 2018-02-22 RX ORDER — METHYLPREDNISOLONE ACETATE 80 MG/ML
80 INJECTION, SUSPENSION INTRA-ARTICULAR; INTRALESIONAL; INTRAMUSCULAR; SOFT TISSUE ONCE
Status: COMPLETED | OUTPATIENT
Start: 2018-02-22 | End: 2018-02-22

## 2018-02-22 RX ORDER — KETOROLAC TROMETHAMINE 30 MG/ML
60 INJECTION, SOLUTION INTRAMUSCULAR; INTRAVENOUS ONCE
Status: COMPLETED | OUTPATIENT
Start: 2018-02-22 | End: 2018-02-22

## 2018-02-22 RX ORDER — DULOXETIN HYDROCHLORIDE 60 MG/1
CAPSULE, DELAYED RELEASE ORAL
Qty: 30 CAPSULE | Refills: 3 | Status: SHIPPED | OUTPATIENT
Start: 2018-02-22 | End: 2018-06-19 | Stop reason: SDUPTHER

## 2018-02-22 RX ADMIN — CYANOCOBALAMIN 1000 MCG: 1000 INJECTION, SOLUTION INTRAMUSCULAR; SUBCUTANEOUS at 09:54

## 2018-02-22 RX ADMIN — KETOROLAC TROMETHAMINE 60 MG: 30 INJECTION, SOLUTION INTRAMUSCULAR; INTRAVENOUS at 10:57

## 2018-02-22 RX ADMIN — METHYLPREDNISOLONE ACETATE 80 MG: 80 INJECTION, SUSPENSION INTRA-ARTICULAR; INTRALESIONAL; INTRAMUSCULAR; SOFT TISSUE at 10:58

## 2018-02-22 RX ADMIN — CYANOCOBALAMIN 1000 MCG: 1000 INJECTION, SOLUTION INTRAMUSCULAR; SUBCUTANEOUS at 10:58

## 2018-02-22 NOTE — PROGRESS NOTES
Subjective   Mirian Fischer is a 48 y.o. female.     Chief Complaint   Patient presents with   • Follow-up       History of Present Illness       Low back pain with chronic DDD- rates pain as 9 on psr of 0-10 today with recent exacerbation. Pain is located in her low back and described as sharp with radiation into the right lower extremity. She is unable to attend physical therapy at this time due to transportation and . No history of substance abuse or addiction.  She is currently prescribed Norco 10 one by mouth every 6 hours when necessary #90 with no refills.  Most recent MRI findings:   Degenerative disc changes in the lumbar disc spaces. There  is a grade 1 spondylolisthesis at L4-5 felt to be secondary to facet  joint arthritis. There is  narrowing of the lateral recesses at L3-4 and  L4-5. The spinal stenosis is most severe at L4-5. There is a fairly  large disc herniation at L5-S1.      Chronic allergic rhinitis-recent exacerbation of allergy symptoms do to pollen.      B- 12 def- monthly b 12 injectons       The following portions of the patient's history were reviewed and updated as appropriate: allergies, current medications, past family history, past medical history, past social history, past surgical history and problem list.    Review of Systems   Constitutional: Positive for chills and fatigue. Negative for activity change, appetite change, fever and unexpected weight change.   HENT: Positive for congestion and sinus pain.    Eyes: Negative.    Respiratory: Negative.    Cardiovascular: Positive for chest pain and leg swelling.   Gastrointestinal: Positive for abdominal pain, constipation and nausea.   Endocrine: Positive for cold intolerance.   Genitourinary: Positive for urgency. Negative for dysuria.   Musculoskeletal: Positive for arthralgias, back pain, myalgias and neck pain. Negative for neck stiffness.   Skin: Negative.    Allergic/Immunologic: Negative.    Neurological: Positive  "for dizziness and headaches.   Hematological: Negative.    Psychiatric/Behavioral: Negative.  Negative for dysphoric mood, self-injury and suicidal ideas.       Objective     /70  Pulse 80  Temp 99.1 °F (37.3 °C) (Tympanic)   Ht 165.1 cm (65\")  Wt 77.6 kg (171 lb)  SpO2 98%  BMI 28.46 kg/m2  Orders Only on 01/23/2018   Component Date Value Ref Range Status   • Wound Culture 01/23/2018 Light growth (2+) Normal Vaginal Ana   Final   • Gram Stain Result 01/23/2018 Moderate (3+) Gram positive bacilli   Final   • Gram Stain Result 01/23/2018 Rare (1+) Epithelial cells seen   Final       Physical Exam   Constitutional: She is oriented to person, place, and time. She appears well-developed and well-nourished.   HENT:   Head: Normocephalic and atraumatic.   Right Ear: External ear normal.   Left Ear: External ear normal.   Nose: Nose normal.   Mouth/Throat: Oropharynx is clear and moist.   Eyes: EOM are normal. Pupils are equal, round, and reactive to light.   Neck: Normal range of motion. Neck supple. No tracheal deviation present. No thyromegaly present.   Cardiovascular: Normal rate, regular rhythm, normal heart sounds and intact distal pulses.  Exam reveals no gallop and no friction rub.    No murmur heard.  Pulmonary/Chest: Effort normal and breath sounds normal. No respiratory distress. She has no wheezes. She has no rales. She exhibits no tenderness.   Abdominal: Soft. Bowel sounds are normal. She exhibits no distension and no mass. There is no tenderness. There is no rebound and no guarding.   Musculoskeletal: Normal range of motion.        Cervical back: She exhibits tenderness.        Lumbar back: She exhibits tenderness, pain and spasm.   Decreased lumbar curvature, there is pain with forward flexion. DTR's +2. No edema.    Neurological: She is alert and oriented to person, place, and time. She has normal reflexes.   CN 2-12 grossly intact    Skin: Skin is warm and dry. No rash noted. No erythema. " No pallor.   Psychiatric: She has a normal mood and affect. Her behavior is normal. Judgment and thought content normal. Her affect is not inappropriate. Cognition and memory are normal.   Denies thoughts of hurting self or others.   Nursing note and vitals reviewed.      Assessment/Plan     Problem List Items Addressed This Visit        Digestive    Vitamin B 12 deficiency    Overview     Monthly b-12 injections            Musculoskeletal and Integument    HNP (herniated nucleus pulposus), lumbar - Primary    DDD (degenerative disc disease), lumbar    Relevant Medications    HYDROcodone-acetaminophen (NORCO)  MG per tablet    methylPREDNISolone acetate (DEPO-medrol) injection 80 mg (Start on 2/22/2018 11:15 AM)    ketorolac (TORADOL) injection 60 mg (Start on 2/22/2018 11:15 AM)    Menthol, Topical Analgesic, (BIOFREEZE) 4 % gel          I have discussed diagnosis in detail today allowing time for questions and answers. Pt is aware of reasons to seek urgent or emergent medical care as well as reasons to return to the clinic for evaluation. Possible side effects, interactions and progression of symptoms discussed as well. Pt / family states understanding.   Emotional support and active listening provided.   Most  Recent MRI and consult notes have been reviewed and discussed. Labs from November 2017 reviewed and discussed.   Marques has been reviewed as consistent.  Uds is on file.   Goal: Improved quality of life and reduction in pain as evidenced by pt report.   Patient has been instructed and counseled regarding opioid misuse and risk of addiction.  We have discussed proper storage and disposal of controlled medication.  Follow up in one month, sooner if needed.          This document has been electronically signed by:  CHARMAINE Doran, NP-C

## 2018-03-02 ENCOUNTER — TELEPHONE (OUTPATIENT)
Dept: FAMILY MEDICINE CLINIC | Facility: CLINIC | Age: 48
End: 2018-03-02

## 2018-03-02 RX ORDER — METFORMIN HYDROCHLORIDE 500 MG/1
500 TABLET, FILM COATED, EXTENDED RELEASE ORAL
Qty: 30 TABLET | Refills: 2
Start: 2018-03-02 | End: 2018-03-05

## 2018-03-02 NOTE — TELEPHONE ENCOUNTER
----- Message from CHARMAINE Dejesus sent at 3/1/2018  5:37 PM EST -----    Approved the changes send me a refill request on this medication or call the pharmacy to see what that the pharmacist recommends and approve a 30 day supply with 2 refills.  ----- Message -----     From: Erlin Arteaga Rep     Sent: 3/1/2018   4:29 PM       To: CHARMAINE Dejesus    [facility] pharmacy called insurance will not cover metformin with leidy Meeks request a change to Glumetza

## 2018-03-05 ENCOUNTER — TELEPHONE (OUTPATIENT)
Dept: FAMILY MEDICINE CLINIC | Facility: CLINIC | Age: 48
End: 2018-03-05

## 2018-03-05 NOTE — TELEPHONE ENCOUNTER
----- Message from CHARMAINE Dejesus sent at 3/5/2018 12:22 PM EST -----  Stop it in the chart and call the pharmacy. Let the patient know that she needs to watch her glucose and let me know if she has any abnormal readings. Stay off metformin.   ----- Message -----     From: Tonya Montero MA     Sent: 3/2/2018   1:29 PM       To: CHARMAINE Dejesus    I fixed the prescriptions for the metformin but received a call from the patient after being notified by the pharmacy. She said that she was under the impression that you had took her off the metformin. What should I tell her?

## 2018-03-22 ENCOUNTER — OFFICE VISIT (OUTPATIENT)
Dept: FAMILY MEDICINE CLINIC | Facility: CLINIC | Age: 48
End: 2018-03-22

## 2018-03-22 VITALS
HEIGHT: 65 IN | DIASTOLIC BLOOD PRESSURE: 70 MMHG | TEMPERATURE: 98.8 F | WEIGHT: 170 LBS | BODY MASS INDEX: 28.32 KG/M2 | HEART RATE: 87 BPM | OXYGEN SATURATION: 97 % | SYSTOLIC BLOOD PRESSURE: 130 MMHG

## 2018-03-22 DIAGNOSIS — M51.36 DDD (DEGENERATIVE DISC DISEASE), LUMBAR: ICD-10-CM

## 2018-03-22 DIAGNOSIS — E53.8 VITAMIN B 12 DEFICIENCY: Primary | ICD-10-CM

## 2018-03-22 DIAGNOSIS — Z29.9 PREVENTIVE MEASURE: ICD-10-CM

## 2018-03-22 DIAGNOSIS — Z23 ENCOUNTER FOR ADMINISTRATION OF VACCINE: ICD-10-CM

## 2018-03-22 PROCEDURE — 90732 PPSV23 VACC 2 YRS+ SUBQ/IM: CPT | Performed by: NURSE PRACTITIONER

## 2018-03-22 PROCEDURE — 99214 OFFICE O/P EST MOD 30 MIN: CPT | Performed by: NURSE PRACTITIONER

## 2018-03-22 PROCEDURE — 96372 THER/PROPH/DIAG INJ SC/IM: CPT | Performed by: NURSE PRACTITIONER

## 2018-03-22 PROCEDURE — G0009 ADMIN PNEUMOCOCCAL VACCINE: HCPCS | Performed by: NURSE PRACTITIONER

## 2018-03-22 RX ORDER — POTASSIUM CHLORIDE 750 MG/1
TABLET, EXTENDED RELEASE ORAL
Qty: 60 TABLET | Refills: 5 | Status: SHIPPED | OUTPATIENT
Start: 2018-03-22 | End: 2018-09-25 | Stop reason: SDUPTHER

## 2018-03-22 RX ORDER — LACTOBACILLUS RHAMNOSUS GG 10B CELL
1 CAPSULE ORAL 2 TIMES DAILY
Qty: 60 CAPSULE | Refills: 5 | Status: SHIPPED | OUTPATIENT
Start: 2018-03-22 | End: 2019-06-19 | Stop reason: SDUPTHER

## 2018-03-22 RX ORDER — HYDROCODONE BITARTRATE AND ACETAMINOPHEN 10; 325 MG/1; MG/1
1 TABLET ORAL EVERY 6 HOURS PRN
Qty: 90 TABLET | Refills: 0 | Status: SHIPPED | OUTPATIENT
Start: 2018-03-22 | End: 2018-04-24 | Stop reason: SDUPTHER

## 2018-03-22 RX ADMIN — CYANOCOBALAMIN 1000 MCG: 1000 INJECTION, SOLUTION INTRAMUSCULAR; SUBCUTANEOUS at 10:58

## 2018-03-22 NOTE — PROGRESS NOTES
Subjective   Mirian Fischer is a 48 y.o. female.     Chief Complaint   Patient presents with   • Other     follow up and refills       History of Present Illness       Hypertension - stable with current medication. Denies any side effects or symptoms of complications.       B- 12 def - taking monthly b-12 injection     Constipation-bloating and constipation are not  improved with current medication regimen.     Preventative healthcare-not had a pneumonia vaccine.  She is at high risk due to chronic health conditions.      Chronic degenerative disc disease with lumbar stenosis  Chronic low back pain which has been present for greater than 5 years.  Patient has a consult at neurosurgeon and orthopedic consult in the past.  She has had joint injections in the past.  Patient has attempted physical therapy which created increased pain.  Recent radiology is on file.  Most recent MRI does show degenerative disc changes in the lumbar region with spondylolithiasis at the L4/5.  There is narrowing of the lateral recesses and L3/4 and L4/5.  Spinal stenosis is most severe at L4/5.  There is a large herniation at L4/S1.  Patient rates her pain today as 7 on pain scale rating of 0-10.  She reports that any lifting, bending or prolonged sitting increases her pain.  Patient has difficulty taking anti-inflammatory medication due to stomach irritation.  Patient is currently maintained on Norco 10/325 mg 1 by mouth every 6 hours when necessary.  Patient reports that thisher pain for a few hours allowing her to be more active in her home and do very mild/light housework.  Patient reports that her pain does interfere with her quality of life as well as ability to do the things that she enjoys.  Patient provides care for her handicapped child which is very physical.  Patient has no history of substance abuse or addiction.  Her pain is described as sharp with radiation into both lower extremities.  The radiation is a tingling burning  "feeling.  She does receive Neurontin 300 mg 3 times a day which is helpful with her pain and tingling.  Patient denies any side effects.     DM 2 - stable         Problem/diagnosis  significant for diverticulitis of the large intestine with perforation, GERD, slow transit constipation, hypertension, colovaginal fistula, chronic left shoulder pain, cubital tunnel syndrome, lumbar disc bulge, degenerative disc disease, herniated nucleus pulposus of the lumbar spine, osteoarthritis multiple sites, stress incontinence, depression, heavy painful periods, high risk medication use, bariatric surgery, tobacco addiction, spinal stenosis of the lumbar region with neurogenic claudication.  The following portions of the patient's history were reviewed and updated as appropriate: allergies, current medications, past family history, past medical history, past social history, past surgical history and problem list.    Review of Systems   Constitutional: Negative for chills, diaphoresis, fatigue, fever and unexpected weight change.   HENT: Positive for sinus pressure and sneezing.    Eyes: Negative.    Respiratory: Negative for cough, choking, shortness of breath and wheezing.    Gastrointestinal: Positive for constipation. Negative for abdominal distention, abdominal pain, diarrhea, nausea and vomiting.   Endocrine: Negative.    Genitourinary: Negative for dysuria and flank pain.   Musculoskeletal: Positive for arthralgias, back pain, myalgias, neck pain and neck stiffness.   Skin: Negative.    Allergic/Immunologic: Negative.    Neurological: Positive for numbness (right leg ) and headaches.   Psychiatric/Behavioral: Negative for dysphoric mood, self-injury and suicidal ideas. The patient is not nervous/anxious.        Objective     /70   Pulse 87   Temp 98.8 °F (37.1 °C) (Tympanic)   Ht 165.1 cm (65\")   Wt 77.1 kg (170 lb)   SpO2 97%   BMI 28.29 kg/m²   Orders Only on 01/23/2018   Component Date Value Ref Range Status "   • Wound Culture 01/26/2018 Light growth (2+) Normal Vaginal Ana   Final   • Gram Stain Result 01/26/2018 Moderate (3+) Gram positive bacilli   Final   • Gram Stain Result 01/26/2018 Rare (1+) Epithelial cells seen   Final       Physical Exam   Constitutional: She is oriented to person, place, and time. Vital signs are normal. She appears well-developed and well-nourished.   Very pleasant lady   HENT:   Head: Normocephalic and atraumatic.   Right Ear: External ear normal.   Left Ear: External ear normal.   Nose: Nose normal.   Mouth/Throat: Oropharynx is clear and moist.   Eyes: EOM are normal. Pupils are equal, round, and reactive to light.   Neck: Normal range of motion. Neck supple. No tracheal deviation present. No thyromegaly present.   Cardiovascular: Normal rate, regular rhythm, normal heart sounds and intact distal pulses.  Exam reveals no gallop and no friction rub.    No murmur heard.  Pulmonary/Chest: Effort normal and breath sounds normal. No respiratory distress. She has no wheezes. She has no rales. She exhibits no tenderness.   Abdominal: Soft. Bowel sounds are normal. She exhibits no distension and no mass. There is no tenderness. There is no rebound and no guarding.   Musculoskeletal: Normal range of motion.        Cervical back: She exhibits tenderness.        Lumbar back: She exhibits tenderness, pain and spasm.   Decreased lumbar curvature, there is pain with forward flexion. DTR's +2. No edema.    Neurological: She is alert and oriented to person, place, and time. She has normal reflexes.   CN 2-12 grossly intact    Skin: Skin is warm and dry. No rash noted. No erythema. No pallor.   Psychiatric: She has a normal mood and affect. Her behavior is normal. Judgment and thought content normal. Her affect is not inappropriate. Cognition and memory are normal.   Nursing note and vitals reviewed.      Assessment/Plan     Problem List Items Addressed This Visit        Digestive    Vitamin B 12  deficiency - Primary    Overview     Monthly b-12 injections            Musculoskeletal and Integument    DDD (degenerative disc disease), lumbar    Relevant Medications    HYDROcodone-acetaminophen (NORCO)  MG per tablet       Other    Preventive measure    Current Assessment & Plan     High risk due to comorbid conditions          Relevant Orders    Pneumococcal Polysaccharide Vaccine 23-Valent (PPSV23) Greater Than or Equal To 1yo Subcutaneous / IM (Completed)      Other Visit Diagnoses     Encounter for administration of vaccine        Relevant Orders    Pneumococcal Polysaccharide Vaccine 23-Valent (PPSV23) Greater Than or Equal To 1yo Subcutaneous / IM (Completed)          Start probiotic and gordon  As ordered daily.  b-12 injection per standing order.   Marques has been reviewed as consistent.  Uds is on file.   Goal: Improved quality of life and reduction in pain as evidenced by pt report.   Patient has been instructed and counseled regarding opioid misuse and risk of addiction.  We have discussed proper storage and disposal of controlled medication.  As part of this patient's treatment plan they are being prescribed controlled substance/substances with potential for abuse. This patient has been made aware of the appropriate use of such medications, including potential risk of somnolence, limited ability to drive and / or work safely, and potential for overdose. It has also been made clear that these medications are for use by this patient only, without concomitant use of alcohol or other substances unless prescribed/advised by medical provider. Patient has completed controlled substance agreement detailing terms of continued prescribing of controlled substances including monitoring Marques reports, drug screens and pill counts. The patient was asked and states they are not receiving narcotic medication from any there provider or any provider that this office has not been made aware of. History and  physical exam exhibit continued safe and appropriate use of controlled substances.   Follow up with neurology as needed, pt to discuss further spinal injections with her neurologist. I have asked staff to call and get her follow-up with Dr. Oliva in [place] as she has seen him in the past for spinal injections.  If unable to get her back in with Dr. Oliva in a reasonable amount of time please call and schedule her an appointment with Dr. Teran so he can facilitate spinal injections.  Staff notified.    Follow up in one month, sooner if needed. Routine labs every 3-6 months.     Errors in dictation may reflect use of voice recognition software and not all errors in transcription may have been detected prior to signing.              This document has been electronically signed by:  CHARMAINE Doran, NP-C

## 2018-03-22 NOTE — PROGRESS NOTES
"Walter Fischer is a 48 y.o. female.     Chief Complaint   Patient presents with   • Other     follow up and refills       History of Present Illness     The following portions of the patient's history were reviewed and updated as appropriate: allergies, current medications, past family history, past medical history, past social history, past surgical history and problem list.    Review of Systems   Constitutional: Positive for fatigue.   HENT: Positive for sinus pain, sinus pressure and sneezing.    Eyes: Positive for pain, redness and itching.   Respiratory: Negative.    Cardiovascular: Positive for leg swelling.   Gastrointestinal: Positive for abdominal pain and constipation.   Endocrine: Negative.    Genitourinary: Positive for pelvic pain.   Musculoskeletal: Positive for back pain, neck pain and neck stiffness.   Skin: Negative.    Allergic/Immunologic: Negative.    Neurological: Positive for dizziness, light-headedness and headaches.       Objective     /70   Pulse 87   Temp 98.8 °F (37.1 °C) (Tympanic)   Ht 165.1 cm (65\")   Wt 77.1 kg (170 lb)   SpO2 97%   BMI 28.29 kg/m²   Orders Only on 01/23/2018   Component Date Value Ref Range Status   • Wound Culture 01/26/2018 Light growth (2+) Normal Vaginal Ana   Final   • Gram Stain Result 01/26/2018 Moderate (3+) Gram positive bacilli   Final   • Gram Stain Result 01/26/2018 Rare (1+) Epithelial cells seen   Final       Physical Exam    Assessment/Plan     Problem List Items Addressed This Visit        Musculoskeletal and Integument    DDD (degenerative disc disease), lumbar    Relevant Medications    HYDROcodone-acetaminophen (NORCO)  MG per tablet      Other Visit Diagnoses    None.                    This document has been electronically signed by:  CHARMAINE Doran, NP-C  "

## 2018-04-24 ENCOUNTER — OFFICE VISIT (OUTPATIENT)
Dept: FAMILY MEDICINE CLINIC | Facility: CLINIC | Age: 48
End: 2018-04-24

## 2018-04-24 VITALS
DIASTOLIC BLOOD PRESSURE: 70 MMHG | BODY MASS INDEX: 28.49 KG/M2 | HEART RATE: 84 BPM | TEMPERATURE: 97.8 F | OXYGEN SATURATION: 99 % | HEIGHT: 65 IN | WEIGHT: 171 LBS | SYSTOLIC BLOOD PRESSURE: 120 MMHG

## 2018-04-24 DIAGNOSIS — M51.36 DDD (DEGENERATIVE DISC DISEASE), LUMBAR: ICD-10-CM

## 2018-04-24 DIAGNOSIS — M48.062 SPINAL STENOSIS, LUMBAR REGION, WITH NEUROGENIC CLAUDICATION: ICD-10-CM

## 2018-04-24 DIAGNOSIS — E53.8 VITAMIN B 12 DEFICIENCY: Primary | ICD-10-CM

## 2018-04-24 DIAGNOSIS — M51.26 HNP (HERNIATED NUCLEUS PULPOSUS), LUMBAR: ICD-10-CM

## 2018-04-24 DIAGNOSIS — M51.36 BULGE OF LUMBAR DISC WITHOUT MYELOPATHY: ICD-10-CM

## 2018-04-24 PROCEDURE — 96372 THER/PROPH/DIAG INJ SC/IM: CPT | Performed by: NURSE PRACTITIONER

## 2018-04-24 PROCEDURE — 99214 OFFICE O/P EST MOD 30 MIN: CPT | Performed by: NURSE PRACTITIONER

## 2018-04-24 RX ORDER — GABAPENTIN 300 MG/1
300 CAPSULE ORAL 3 TIMES DAILY
Qty: 90 CAPSULE | Refills: 2 | Status: SHIPPED | OUTPATIENT
Start: 2018-04-24 | End: 2018-07-24

## 2018-04-24 RX ORDER — HYDROCODONE BITARTRATE AND ACETAMINOPHEN 10; 325 MG/1; MG/1
1 TABLET ORAL EVERY 6 HOURS PRN
Qty: 90 TABLET | Refills: 0 | Status: SHIPPED | OUTPATIENT
Start: 2018-04-24 | End: 2018-05-17 | Stop reason: SDUPTHER

## 2018-04-24 RX ADMIN — CYANOCOBALAMIN 1000 MCG: 1000 INJECTION, SOLUTION INTRAMUSCULAR; SUBCUTANEOUS at 10:25

## 2018-04-24 NOTE — PROGRESS NOTES
Subjective   Mirian Fischer is a 48 y.o. female.     Chief Complaint   Patient presents with   • Follow-up   exacerbation of low back pain and weakness     History of Present Illness     Pt has been seen by multiple neurology and ortho providers. 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 She is rating her pain as 8 on psr of 0-10 today. Pain is located in her mid and low back with radiation into her right buttock and leg. Right leg feels weak and numb at times. She has had spinal injections in the past which offered minimal pain reduction for a few weeks. She is having difficulty sleeping and performing her daily routine due to pain and decreased physical function. She does report several episodes of bowel incontinence during the night and continues to have some daily stress incontinence. Norco and anti-inflammatory medication reduce her pain by at least 50 %. She has no history of substance abuse . She has attended physical therapy in the past with little improvement and is unable to attend at this time due to transportation and  issues.   MRI on file showing : Grade 1 Spondylolisthesis at L4-L5,   Facet arthritis. Narrowing of the lateral recesses at L3-L4 and L4-L5. Spinal stenosis is most servere at L4-L5. Large disc herniation at L5-S1.    Mirian reports increased pain, worsening of right leg weakness and numbness and loss of bowel control.       B 12 def - needs injection today       The following portions of the patient's history were reviewed and updated as appropriate: allergies, current medications, past family history, past medical history, past social history, past surgical history and problem list.    Review of Systems   Constitutional: Positive for activity change, appetite change and fatigue. Negative for fever and unexpected weight change.   HENT: Positive for sinus pain. Negative  "for sore throat.    Eyes: Negative.    Respiratory: Positive for cough. Negative for choking, chest tightness, shortness of breath and wheezing.    Cardiovascular: Positive for leg swelling (right leg ). Negative for chest pain and palpitations.   Gastrointestinal: Positive for abdominal pain (periodically, not of new onset ), constipation and diarrhea. Negative for nausea and vomiting.   Endocrine: Negative.    Genitourinary: Positive for urgency. Negative for difficulty urinating and pelvic pain.   Musculoskeletal: Positive for arthralgias, back pain, gait problem, myalgias and neck pain. Negative for neck stiffness.   Skin: Negative.    Allergic/Immunologic: Negative.    Neurological: Positive for weakness, numbness and headaches. Negative for dizziness, seizures, facial asymmetry and speech difficulty.   Hematological: Negative.    Psychiatric/Behavioral: Negative for dysphoric mood, self-injury and suicidal ideas.       Objective     /70   Pulse 84   Temp 97.8 °F (36.6 °C) (Tympanic)   Ht 165.1 cm (65\")   Wt 77.6 kg (171 lb)   SpO2 99%   BMI 28.46 kg/m²       Physical Exam   Constitutional: She is oriented to person, place, and time. Vital signs are normal. She appears well-developed and well-nourished. No distress.   Very pleasant lady, appears to be in acute pain    HENT:   Head: Normocephalic and atraumatic.   Right Ear: External ear normal.   Left Ear: External ear normal.   Nose: Nose normal.   Mouth/Throat: Oropharynx is clear and moist. No oropharyngeal exudate.   Eyes: EOM are normal. Pupils are equal, round, and reactive to light.   Neck: Normal range of motion. Neck supple. No tracheal deviation present. No thyromegaly present.   Cardiovascular: Normal rate, regular rhythm, normal heart sounds and intact distal pulses.  Exam reveals no gallop and no friction rub.    No murmur heard.  Pulmonary/Chest: Effort normal and breath sounds normal. No respiratory distress. She has no wheezes. She " has no rales. She exhibits no tenderness.   Abdominal: Soft. Bowel sounds are normal. She exhibits no distension and no mass. There is no tenderness. There is no rebound and no guarding.   Musculoskeletal:        Right hip: She exhibits decreased range of motion.        Right ankle: She exhibits no swelling and normal pulse.        Cervical back: She exhibits tenderness.        Lumbar back: She exhibits tenderness, pain and spasm.   Decreased lumbar curvature, there is pain with forward flexion. DTR's +2. No edema.    Lymphadenopathy:     She has no cervical adenopathy.   Neurological: She is alert and oriented to person, place, and time.   Reflex Scores:       Patellar reflexes are 1+ on the right side and 2+ on the left side.       Achilles reflexes are 1+ on the right side and 2+ on the left side.  CN 2-12 grossly intact    Skin: Skin is warm and dry. Capillary refill takes less than 2 seconds. No rash noted. She is not diaphoretic. No erythema. No pallor.   Psychiatric: She has a normal mood and affect. Her behavior is normal. Judgment and thought content normal. Her affect is not inappropriate. Cognition and memory are normal.   Nursing note and vitals reviewed.      Assessment/Plan     Problem List Items Addressed This Visit        Digestive    Vitamin B 12 deficiency - Primary    Overview     Monthly b-12 injections            Musculoskeletal and Integument    HNP (herniated nucleus pulposus), lumbar    Relevant Orders    Ambulatory Referral to Neurology    DDD (degenerative disc disease), lumbar    Relevant Medications    HYDROcodone-acetaminophen (NORCO)  MG per tablet    Bulge of lumbar disc without myelopathy    Overview     Degenerative disc changes in the lumbar disc spaces. There  is a grade 1 spondylolisthesis at L4-5 felt to be secondary to facet  joint arthritis. There is  narrowing of the lateral recesses at L3-4 and  L4-5. The spinal stenosis is most severe at L4-5. There is a fairly  large  disc herniation at L5-S1.         Relevant Orders    Ambulatory Referral to Neurology       Other    Spinal stenosis, lumbar region, with neurogenic claudication    Overview     Pre-surgical planning w/u         Relevant Orders    Ambulatory Referral to Neurology      Other Visit Diagnoses    None.       Refill Neurontin 300 mg 1 by mouth 3 times a day #90 with 2 refills.  Continue Norco 10-3 25 one by mouth every 6 hours when necessary #90 with no refill.  Marques has been reviewed as consistent.  Uds is on file.   Proper body mechanics has been reviewed and discussed today.  Patient has been instructed and counseled regarding opioid misuse and risk of addiction.  We have discussed proper storage and disposal of controlled medication.  Goal: Improved quality of life and reduction in pain as evidenced by pt report.   As part of this patient's treatment plan they are being prescribed controlled substance/substances with potential for abuse. This patient has been made aware of the appropriate use of such medications, including potential risk of somnolence, limited ability to drive and / or work safely, and potential for overdose. It has also been made clear that these medications are for use by this patient only, without concomitant use of alcohol or other substances unless prescribed/advised by medical provider. Patient has completed controlled substance agreement detailing terms of continued prescribing of controlled substances including monitoring Marques reports, drug screens and pill counts. The patient was asked and states they are not receiving narcotic medication from any there provider or any provider that this office has not been made aware of. History and physical exam exhibit continued safe and appropriate use of controlled substances.       B 12 injection today per standing order.  Staff OSWALDO Montero MA has been instructed to work of referral to DR. Teran or Dr. Chiu in [place] and schedule for the  first available appointment.       Patient's Body mass index is 28.46 kg/m². BMI is above normal parameters. Follow-up plan includes:  educational material and nutrition counseling.     Follow up in one month, sooner if needed. Routine labs every 3-6 months.     Errors in dictation may reflect use of voice recognition software and not all errors in transcription may have been detected prior to signing.              This document has been electronically signed by:  CHARMAINE Doran, NP-C

## 2018-04-27 DIAGNOSIS — M54.5 LOW BACK PAIN, UNSPECIFIED BACK PAIN LATERALITY, UNSPECIFIED CHRONICITY, WITH SCIATICA PRESENCE UNSPECIFIED: Primary | ICD-10-CM

## 2018-05-17 ENCOUNTER — OFFICE VISIT (OUTPATIENT)
Dept: FAMILY MEDICINE CLINIC | Facility: CLINIC | Age: 48
End: 2018-05-17

## 2018-05-17 VITALS
BODY MASS INDEX: 28.82 KG/M2 | HEART RATE: 89 BPM | TEMPERATURE: 98.2 F | WEIGHT: 173 LBS | DIASTOLIC BLOOD PRESSURE: 60 MMHG | HEIGHT: 65 IN | SYSTOLIC BLOOD PRESSURE: 100 MMHG | OXYGEN SATURATION: 99 %

## 2018-05-17 DIAGNOSIS — R32 URINARY AND BOWEL INCONTINENCE: ICD-10-CM

## 2018-05-17 DIAGNOSIS — E11.51 CONTROLLED TYPE 2 DM WITH PERIPHERAL CIRCULATORY DISORDER (HCC): ICD-10-CM

## 2018-05-17 DIAGNOSIS — E53.8 VITAMIN B 12 DEFICIENCY: ICD-10-CM

## 2018-05-17 DIAGNOSIS — M51.26 HNP (HERNIATED NUCLEUS PULPOSUS), LUMBAR: Primary | ICD-10-CM

## 2018-05-17 DIAGNOSIS — I10 ESSENTIAL HYPERTENSION: ICD-10-CM

## 2018-05-17 DIAGNOSIS — M48.062 SPINAL STENOSIS, LUMBAR REGION, WITH NEUROGENIC CLAUDICATION: ICD-10-CM

## 2018-05-17 DIAGNOSIS — M51.36 DDD (DEGENERATIVE DISC DISEASE), LUMBAR: ICD-10-CM

## 2018-05-17 DIAGNOSIS — E55.9 VITAMIN D DEFICIENCY: ICD-10-CM

## 2018-05-17 DIAGNOSIS — R15.9 URINARY AND BOWEL INCONTINENCE: ICD-10-CM

## 2018-05-17 DIAGNOSIS — M51.36 BULGE OF LUMBAR DISC WITHOUT MYELOPATHY: ICD-10-CM

## 2018-05-17 DIAGNOSIS — M51.26 HNP (HERNIATED NUCLEUS PULPOSUS), LUMBAR: ICD-10-CM

## 2018-05-17 LAB
25(OH)D3+25(OH)D2 SERPL-MCNC: 76 NG/ML
ALBUMIN SERPL-MCNC: 4.2 G/DL (ref 3.5–5)
ALBUMIN/GLOB SERPL: 2.1 G/DL (ref 1.5–2.5)
ALP SERPL-CCNC: 71 U/L (ref 35–104)
ALT SERPL-CCNC: 19 U/L (ref 10–36)
AST SERPL-CCNC: 15 U/L (ref 10–30)
BASOPHILS # BLD AUTO: 0.11 10*3/MM3 (ref 0–0.3)
BASOPHILS NFR BLD AUTO: 1.2 % (ref 0–2)
BILIRUB SERPL-MCNC: 0.3 MG/DL (ref 0.2–1.8)
BUN SERPL-MCNC: 8 MG/DL (ref 7–21)
BUN/CREAT SERPL: 16.3 (ref 7–25)
CALCIUM SERPL-MCNC: 8.4 MG/DL (ref 7.7–10)
CHLORIDE SERPL-SCNC: 107 MMOL/L (ref 99–112)
CO2 SERPL-SCNC: 27.5 MMOL/L (ref 24.3–31.9)
CREAT SERPL-MCNC: 0.49 MG/DL (ref 0.43–1.29)
EOSINOPHIL # BLD AUTO: 0.39 10*3/MM3 (ref 0–0.7)
EOSINOPHIL NFR BLD AUTO: 4.3 % (ref 0–5)
ERYTHROCYTE [DISTWIDTH] IN BLOOD BY AUTOMATED COUNT: 14.3 % (ref 11.5–14.5)
GFR SERPLBLD CREATININE-BSD FMLA CKD-EPI: 134 ML/MIN/1.73
GFR SERPLBLD CREATININE-BSD FMLA CKD-EPI: >150 ML/MIN/1.73
GLOBULIN SER CALC-MCNC: 2 GM/DL
GLUCOSE SERPL-MCNC: 81 MG/DL (ref 70–110)
HBA1C MFR BLD: 5.5 % (ref 4.5–5.7)
HCT VFR BLD AUTO: 38.5 % (ref 37–47)
HGB BLD-MCNC: 12.5 G/DL (ref 12–16)
IMM GRANULOCYTES # BLD: 0.03 10*3/MM3 (ref 0–0.03)
IMM GRANULOCYTES NFR BLD: 0.3 % (ref 0–0.5)
LYMPHOCYTES # BLD AUTO: 3.02 10*3/MM3 (ref 1–3)
LYMPHOCYTES NFR BLD AUTO: 33.4 % (ref 21–51)
MCH RBC QN AUTO: 27.6 PG (ref 27–33)
MCHC RBC AUTO-ENTMCNC: 32.5 G/DL (ref 33–37)
MCV RBC AUTO: 85 FL (ref 80–94)
MONOCYTES # BLD AUTO: 0.77 10*3/MM3 (ref 0.1–0.9)
MONOCYTES NFR BLD AUTO: 8.5 % (ref 0–10)
NEUTROPHILS # BLD AUTO: 4.73 10*3/MM3 (ref 1.4–6.5)
NEUTROPHILS NFR BLD AUTO: 52.3 % (ref 30–70)
PLATELET # BLD AUTO: 231 10*3/MM3 (ref 130–400)
POTASSIUM SERPL-SCNC: 4.3 MMOL/L (ref 3.5–5.3)
PROT SERPL-MCNC: 6.2 G/DL (ref 6–8)
RBC # BLD AUTO: 4.53 10*6/MM3 (ref 4.2–5.4)
SODIUM SERPL-SCNC: 135 MMOL/L (ref 135–153)
TSH SERPL DL<=0.005 MIU/L-ACNC: 1.84 MIU/ML (ref 0.55–4.78)
WBC # BLD AUTO: 9.05 10*3/MM3 (ref 4.5–12.5)

## 2018-05-17 PROCEDURE — 96372 THER/PROPH/DIAG INJ SC/IM: CPT | Performed by: NURSE PRACTITIONER

## 2018-05-17 PROCEDURE — 36415 COLL VENOUS BLD VENIPUNCTURE: CPT | Performed by: NURSE PRACTITIONER

## 2018-05-17 PROCEDURE — 99214 OFFICE O/P EST MOD 30 MIN: CPT | Performed by: NURSE PRACTITIONER

## 2018-05-17 RX ORDER — HYDROCODONE BITARTRATE AND ACETAMINOPHEN 10; 325 MG/1; MG/1
1 TABLET ORAL EVERY 6 HOURS PRN
Qty: 90 TABLET | Refills: 0 | Status: SHIPPED | OUTPATIENT
Start: 2018-05-17 | End: 2018-06-18 | Stop reason: SDUPTHER

## 2018-05-17 RX ADMIN — CYANOCOBALAMIN 1000 MCG: 1000 INJECTION, SOLUTION INTRAMUSCULAR; SUBCUTANEOUS at 13:56

## 2018-05-17 NOTE — PROGRESS NOTES
Subjective   Mirian Fischer is a 48 y.o. female.     Chief Complaint   Patient presents with   • Follow-up   • Hypertension   • Carpal Tunnel       History of Present Illness     Chronic lumbar pain with spondylolisthesis, disc displacement and bowel incontinence. Pain is located in her low back with radiation into her right lower extremity. She has been seen by neuro and orthopedic for consult. She is having some increasing incontinence of bowel. Has neuro consult scheduled a the end of this month. Rates her pain as 5 on psr with , constant sharp with tingling down her right leg. No history of substance abuse or addiction. Norco is helpful to decrease her pain. 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     MRI of lumbar spine 9/21/2016   REASON FOR EXAM:  HNP; M43.19-Spondylolisthesis, multiple sites in  spine; M51.26-Other intervertebral disc displacement, lumbar region;  M51.36-Other intervertebral disc degeneration, lumbar region.   IMPRESSION:  Degenerative disc changes in the lumbar disc spaces. There  is a grade 1 spondylolisthesis at L4-5 felt to be secondary to facet  joint arthritis. There is  narrowing of the lateral recesses at L3-4 and  L4-5. The spinal stenosis is most severe at L4-5. There is a fairly  large disc herniation at L5-S1.    Will order repeat MRI to be performed prior to her upcoming neurology consult.    B 12 def - b 12 injections monthly are helping with fatigue     Diabetes-denies any hypoglycemic or hyperglycemic episodes.  Diet controlled.      Hypertension - stable with current medication. Denies any side effects or symptoms of complications.       The following portions of the patient's history were reviewed and updated as appropriate: allergies, current medications, past family history, past medical history, past social history, past surgical history and problem list.    Review of Systems   Constitutional: Positive  "for fatigue. Negative for appetite change and unexpected weight change.   HENT: Positive for congestion and ear pain. Negative for nosebleeds, postnasal drip, rhinorrhea, sore throat, trouble swallowing and voice change.    Eyes: Negative for pain and visual disturbance.   Respiratory: Negative for cough, shortness of breath and wheezing.    Cardiovascular: Negative for chest pain and palpitations.   Gastrointestinal: Positive for abdominal pain, constipation and diarrhea. Negative for blood in stool.   Endocrine: Negative for cold intolerance and polydipsia.   Genitourinary: Negative for difficulty urinating, flank pain and hematuria.   Musculoskeletal: Positive for arthralgias, back pain and joint swelling. Negative for gait problem, myalgias and neck stiffness.   Skin: Negative for color change and rash.   Allergic/Immunologic: Negative.    Neurological: Positive for numbness and headaches. Negative for syncope.   Hematological: Negative.    Psychiatric/Behavioral: Positive for sleep disturbance. Negative for dysphoric mood and suicidal ideas. The patient is not nervous/anxious.    All other systems reviewed and are negative.      Objective     /60   Pulse 89   Temp 98.2 °F (36.8 °C) (Temporal Artery )   Ht 165.1 cm (65\")   Wt 78.5 kg (173 lb)   SpO2 99%   BMI 28.79 kg/m²   Orders Only on 01/23/2018   Component Date Value Ref Range Status   • Wound Culture 01/23/2018 Light growth (2+) Normal Vaginal Ana   Final   • Gram Stain Result 01/23/2018 Moderate (3+) Gram positive bacilli   Final   • Gram Stain Result 01/23/2018 Rare (1+) Epithelial cells seen   Final       Physical Exam   Constitutional: She is oriented to person, place, and time. She appears well-developed and well-nourished.   HENT:   Head: Normocephalic.   Right Ear: External ear normal.   Left Ear: External ear normal.   Nose: Nose normal.   Mouth/Throat: Oropharynx is clear and moist.   Eyes: Pupils are equal, round, and reactive to " light.   Neck: Normal range of motion. Neck supple. No tracheal deviation present. No thyromegaly present.   Cardiovascular: Normal rate, regular rhythm and normal heart sounds.  Exam reveals no gallop and no friction rub.    No murmur heard.  Pulmonary/Chest: Effort normal and breath sounds normal. No respiratory distress. She has no wheezes. She has no rales. She exhibits no tenderness.   Abdominal: Soft. Bowel sounds are normal. She exhibits no distension and no mass. There is no tenderness. There is no rebound and no guarding.   Musculoskeletal:        Lumbar back: She exhibits decreased range of motion, tenderness, pain and spasm.   Neurological: She is alert and oriented to person, place, and time. She is not disoriented. No cranial nerve deficit or sensory deficit. She exhibits abnormal muscle tone.   Reflex Scores:       Patellar reflexes are 1+ on the right side and 2+ on the left side.  CN 2-12 grossly intact    Skin: Skin is warm and dry. No rash noted. No erythema.   Psychiatric: She has a normal mood and affect. Her behavior is normal. Judgment and thought content normal.       Assessment/Plan     Problem List Items Addressed This Visit        Cardiovascular and Mediastinum    Controlled type 2 DM with peripheral circulatory disorder    Relevant Orders    Urine Drug Screen - Urine, Clean Catch    Comprehensive Metabolic Panel    TSH    Hemoglobin A1c    Vitamin D 25 Hydroxy    CBC w AUTO Differential    Essential hypertension    Relevant Orders    Urine Drug Screen - Urine, Clean Catch    Comprehensive Metabolic Panel    TSH    Hemoglobin A1c    Vitamin D 25 Hydroxy    CBC w AUTO Differential       Digestive    Vitamin B 12 deficiency    Overview     Monthly b-12 injections         Relevant Orders    Comprehensive Metabolic Panel    TSH    Hemoglobin A1c    Vitamin D 25 Hydroxy    CBC w AUTO Differential    Vitamin D deficiency    Relevant Orders    Comprehensive Metabolic Panel    TSH    Hemoglobin A1c     Vitamin D 25 Hydroxy    CBC w AUTO Differential       Musculoskeletal and Integument    HNP (herniated nucleus pulposus), lumbar - Primary    Relevant Orders    MRI Lumbar Spine Without Contrast    Urine Drug Screen - Urine, Clean Catch    Ambulatory Referral to Physical Therapy Evaluate and treat    Comprehensive Metabolic Panel    TSH    Hemoglobin A1c    Vitamin D 25 Hydroxy    CBC w AUTO Differential    DDD (degenerative disc disease), lumbar    Relevant Medications    HYDROcodone-acetaminophen (NORCO)  MG per tablet    Other Relevant Orders    MRI Lumbar Spine Without Contrast    Urine Drug Screen - Urine, Clean Catch    Ambulatory Referral to Physical Therapy Evaluate and treat    Comprehensive Metabolic Panel    TSH    Hemoglobin A1c    Vitamin D 25 Hydroxy    CBC w AUTO Differential    Bulge of lumbar disc without myelopathy    Overview     Degenerative disc changes in the lumbar disc spaces. There  is a grade 1 spondylolisthesis at L4-5 felt to be secondary to facet  joint arthritis. There is  narrowing of the lateral recesses at L3-4 and  L4-5. The spinal stenosis is most severe at L4-5. There is a fairly  large disc herniation at L5-S1.         Relevant Orders    MRI Lumbar Spine Without Contrast    Urine Drug Screen - Urine, Clean Catch    Ambulatory Referral to Physical Therapy Evaluate and treat    Comprehensive Metabolic Panel    TSH    Hemoglobin A1c    Vitamin D 25 Hydroxy    CBC w AUTO Differential       Other    Spinal stenosis, lumbar region, with neurogenic claudication    Overview     Pre-surgical planning w/u         Relevant Orders    MRI Lumbar Spine Without Contrast    Urine Drug Screen - Urine, Clean Catch    Ambulatory Referral to Physical Therapy Evaluate and treat    Comprehensive Metabolic Panel    TSH    Hemoglobin A1c    Vitamin D 25 Hydroxy    CBC w AUTO Differential    Urinary and bowel incontinence    Relevant Orders    Comprehensive Metabolic Panel    TSH    Hemoglobin  A1c    Vitamin D 25 Hydroxy    CBC w AUTO Differential          Patient has been given a physical therapy order for evaluate and treat.  I have discussed with her the need to keep this one appointment so that she can be working on home exercises if advised.  Emotional support provided as she is the sole caregiver to a extremely handicapped child.  I have discussed diagnosis in detail today allowing time for questions and answers. Pt is aware of reasons to seek urgent or emergent medical care as well as reasons to return to the clinic for evaluation. Possible side effects, interactions and progression of symptoms discussed as well. Pt / family states understanding.   Emotional support and active listening provided.   Refill Norco  one by mouth every 6 hours when necessary #90 with no refill.  Marques has been reviewed as consistent.  Uds is on file.   Goal: Improved quality of life and reduction in pain as evidenced by pt report.   Patient has been instructed and counseled regarding opioid misuse and risk of addiction.  We have discussed proper storage and disposal of controlled medication.    Schedule MRI of the lumbar spine.  Screening lab work today.  B12 injection today, tolerated well.  Follow-up in one month, sooner if needed.    Errors in dictation may reflect use of voice recognition software and not all errors in transcription may have been detected prior to signing.              This document has been electronically signed by:  CHARMAINE Doran, NP-C

## 2018-05-30 ENCOUNTER — TELEPHONE (OUTPATIENT)
Dept: FAMILY MEDICINE CLINIC | Facility: CLINIC | Age: 48
End: 2018-05-30

## 2018-06-06 DIAGNOSIS — K59.01 SLOW TRANSIT CONSTIPATION: ICD-10-CM

## 2018-06-06 RX ORDER — DICYCLOMINE HYDROCHLORIDE 10 MG/1
CAPSULE ORAL
Qty: 120 CAPSULE | Refills: 5 | OUTPATIENT
Start: 2018-06-06

## 2018-06-18 ENCOUNTER — OFFICE VISIT (OUTPATIENT)
Dept: FAMILY MEDICINE CLINIC | Facility: CLINIC | Age: 48
End: 2018-06-18

## 2018-06-18 VITALS
TEMPERATURE: 98.8 F | OXYGEN SATURATION: 97 % | WEIGHT: 172 LBS | HEIGHT: 65 IN | DIASTOLIC BLOOD PRESSURE: 70 MMHG | HEART RATE: 79 BPM | BODY MASS INDEX: 28.66 KG/M2 | SYSTOLIC BLOOD PRESSURE: 120 MMHG

## 2018-06-18 DIAGNOSIS — F32.9 REACTIVE DEPRESSION: ICD-10-CM

## 2018-06-18 DIAGNOSIS — M51.36 DDD (DEGENERATIVE DISC DISEASE), LUMBAR: ICD-10-CM

## 2018-06-18 DIAGNOSIS — M48.062 SPINAL STENOSIS, LUMBAR REGION, WITH NEUROGENIC CLAUDICATION: Primary | ICD-10-CM

## 2018-06-18 DIAGNOSIS — I10 ESSENTIAL HYPERTENSION: ICD-10-CM

## 2018-06-18 DIAGNOSIS — M51.36 BULGE OF LUMBAR DISC WITHOUT MYELOPATHY: ICD-10-CM

## 2018-06-18 DIAGNOSIS — E53.8 VITAMIN B 12 DEFICIENCY: ICD-10-CM

## 2018-06-18 PROCEDURE — 99214 OFFICE O/P EST MOD 30 MIN: CPT | Performed by: NURSE PRACTITIONER

## 2018-06-18 RX ORDER — HYDROCODONE BITARTRATE AND ACETAMINOPHEN 10; 325 MG/1; MG/1
1 TABLET ORAL EVERY 6 HOURS PRN
Qty: 90 TABLET | Refills: 0 | Status: SHIPPED | OUTPATIENT
Start: 2018-06-18 | End: 2018-07-24 | Stop reason: SDUPTHER

## 2018-06-18 NOTE — PROGRESS NOTES
Subjective   Mirian Fischer is a 48 y.o. female.     Chief Complaint   Patient presents with   • Hypertension   • Diabetes   • Anemia       History of Present Illness     Pt was unable to go for her neurology consult or MRI due to not having a  for her handicapped child.     Exacerbation of chronic low back pain. She has been seen by neurology and spinal surgery is recommended. She has a handicapped child that requires total care which she has very little family or friends qualified to provide care for. Lifting her child increases her pain. Rates her pain as 8 on psr of 0-10 today. Pain medication reduces only mildly. Continues cymbalta. She has tried physical therapy and weight loss with no improvement. Describes her pain as constant sharp aching pain. No history of substance abuse or addiction.     B 12 def - stable with monthly injections.    Hypertension - stable with beta blocker, HCTZ and lisinopril     Depression - she states that she does at times feel down even with her current medications. Now that her son has got so big she is unable to lift him and brigida him to the car or push him in a stroller. This keeps them isolated in her home. She has very little family that can care for his feeding tubes and willing to change his diapers. She worries what will happen to him when she can no longer put off surgery.       The following portions of the patient's history were reviewed and updated as appropriate: allergies, current medications, past family history, past medical history, past social history, past surgical history and problem list.    Review of Systems   Constitutional: Negative for appetite change, fatigue and unexpected weight change.   HENT: Positive for ear pain, sinus pain and sinus pressure. Negative for congestion, nosebleeds, postnasal drip, rhinorrhea, sore throat, trouble swallowing and voice change.    Eyes: Negative for pain and visual disturbance.   Respiratory: Negative for  "cough, shortness of breath and wheezing.    Cardiovascular: Negative for chest pain and palpitations.   Gastrointestinal: Negative for abdominal pain, blood in stool, constipation and diarrhea.   Endocrine: Negative for cold intolerance and polydipsia.   Genitourinary: Negative for difficulty urinating, dysuria, flank pain, hematuria and pelvic pain.   Musculoskeletal: Positive for back pain. Negative for arthralgias, gait problem, joint swelling and myalgias.   Skin: Negative for color change and rash.   Allergic/Immunologic: Negative.    Neurological: Negative for syncope, numbness and headaches.   Hematological: Negative.    Psychiatric/Behavioral: Positive for dysphoric mood. Negative for sleep disturbance and suicidal ideas.   All other systems reviewed and are negative.      Objective     /70   Pulse 79   Temp 98.8 °F (37.1 °C) (Oral)   Ht 165.1 cm (65\")   Wt 78 kg (172 lb)   SpO2 97%   BMI 28.62 kg/m²   Office Visit on 05/17/2018   Component Date Value Ref Range Status   • Glucose 05/17/2018 81  70 - 110 mg/dL Final   • BUN 05/17/2018 8  7 - 21 mg/dL Final   • Creatinine 05/17/2018 0.49  0.43 - 1.29 mg/dL Final   • eGFR Non African Am 05/17/2018 134  >60 mL/min/1.73 Final   • eGFR African Am 05/17/2018 >150  >60 mL/min/1.73 Final   • BUN/Creatinine Ratio 05/17/2018 16.3  7.0 - 25.0 Final   • Sodium 05/17/2018 135  135 - 153 mmol/L Final   • Potassium 05/17/2018 4.3  3.5 - 5.3 mmol/L Final   • Chloride 05/17/2018 107  99 - 112 mmol/L Final   • Total CO2 05/17/2018 27.5  24.3 - 31.9 mmol/L Final   • Calcium 05/17/2018 8.4  7.7 - 10.0 mg/dL Final   • Total Protein 05/17/2018 6.2  6.0 - 8.0 g/dL Final   • Albumin 05/17/2018 4.20  3.50 - 5.00 g/dL Final   • Globulin 05/17/2018 2.0  gm/dL Final   • A/G Ratio 05/17/2018 2.1  1.5 - 2.5 g/dL Final   • Total Bilirubin 05/17/2018 0.3  0.2 - 1.8 mg/dL Final   • Alkaline Phosphatase 05/17/2018 71  35 - 104 U/L Final   • AST (SGOT) 05/17/2018 15  10 - 30 U/L " Final   • ALT (SGPT) 05/17/2018 19  10 - 36 U/L Final   • TSH 05/17/2018 1.845  0.550 - 4.780 mIU/mL Final   • Hemoglobin A1C 05/17/2018 5.50  4.50 - 5.70 % Final   • 25 Hydroxy, Vitamin D 05/17/2018 76.0  ng/ml Final   • WBC 05/17/2018 9.05  4.50 - 12.50 10*3/mm3 Final   • RBC 05/17/2018 4.53  4.20 - 5.40 10*6/mm3 Final   • Hemoglobin 05/17/2018 12.5  12.0 - 16.0 g/dL Final   • Hematocrit 05/17/2018 38.5  37.0 - 47.0 % Final   • MCV 05/17/2018 85.0  80.0 - 94.0 fL Final   • MCH 05/17/2018 27.6  27.0 - 33.0 pg Final   • MCHC 05/17/2018 32.5* 33.0 - 37.0 g/dL Final   • RDW 05/17/2018 14.3  11.5 - 14.5 % Final   • Platelets 05/17/2018 231  130 - 400 10*3/mm3 Final   • Neutrophil Rel % 05/17/2018 52.3  30.0 - 70.0 % Final   • Lymphocyte Rel % 05/17/2018 33.4  21.0 - 51.0 % Final   • Monocyte Rel % 05/17/2018 8.5  0.0 - 10.0 % Final   • Eosinophil Rel % 05/17/2018 4.3  0.0 - 5.0 % Final   • Basophil Rel % 05/17/2018 1.2  0.0 - 2.0 % Final   • Neutrophils Absolute 05/17/2018 4.73  1.40 - 6.50 10*3/mm3 Final   • Lymphocytes Absolute 05/17/2018 3.02* 1.00 - 3.00 10*3/mm3 Final   • Monocytes Absolute 05/17/2018 0.77  0.10 - 0.90 10*3/mm3 Final   • Eosinophils Absolute 05/17/2018 0.39  0.00 - 0.70 10*3/mm3 Final   • Basophils Absolute 05/17/2018 0.11  0.00 - 0.30 10*3/mm3 Final   • Immature Granulocyte Rel % 05/17/2018 0.3  0.0 - 0.5 % Final   • Immature Grans Absolute 05/17/2018 0.03  0.00 - 0.03 10*3/mm3 Final       Physical Exam   Constitutional: She is oriented to person, place, and time. She appears well-developed and well-nourished.   HENT:   Head: Normocephalic.   Right Ear: Hearing, tympanic membrane, external ear and ear canal normal.   Left Ear: Hearing, tympanic membrane, external ear and ear canal normal.   Nose: Nose normal.   Mouth/Throat: Oropharynx is clear and moist.   Eyes: Pupils are equal, round, and reactive to light.   Neck: Normal range of motion. Neck supple. No tracheal deviation present. No  thyromegaly present.   Cardiovascular: Normal rate, regular rhythm and normal heart sounds.  Exam reveals no gallop and no friction rub.    No murmur heard.  Pulmonary/Chest: Effort normal and breath sounds normal. No respiratory distress. She has no wheezes. She has no rales. She exhibits no tenderness.   Abdominal: Soft. Bowel sounds are normal. She exhibits no distension and no mass. There is no tenderness. There is no rebound and no guarding.   Musculoskeletal:        Lumbar back: She exhibits decreased range of motion, tenderness, pain and spasm.   Neurological: She is alert and oriented to person, place, and time. She is not disoriented. No cranial nerve deficit or sensory deficit. She exhibits abnormal muscle tone.   Reflex Scores:       Patellar reflexes are 1+ on the right side and 2+ on the left side.  CN 2-12 grossly intact    Skin: Skin is warm and dry. No rash noted. No erythema.   Psychiatric: She has a normal mood and affect. Her behavior is normal. Judgment and thought content normal.       Assessment/Plan     Problem List Items Addressed This Visit        Cardiovascular and Mediastinum    Essential hypertension       Digestive    Vitamin B 12 deficiency    Overview     Monthly b-12 injections            Musculoskeletal and Integument    DDD (degenerative disc disease), lumbar    Relevant Medications    HYDROcodone-acetaminophen (NORCO)  MG per tablet    Bulge of lumbar disc without myelopathy    Overview     Degenerative disc changes in the lumbar disc spaces. There  is a grade 1 spondylolisthesis at L4-5 felt to be secondary to facet  joint arthritis. There is  narrowing of the lateral recesses at L3-4 and  L4-5. The spinal stenosis is most severe at L4-5. There is a fairly  large disc herniation at L5-S1.            Other    Spinal stenosis, lumbar region, with neurogenic claudication - Primary    Overview     Pre-surgical planning w/u         Reactive depression    Overview     Due to her  current home bound status and health of her son              Emotional support and active listening provided.   I have discussed diagnosis in detail today allowing time for questions and answers. Pt is aware of reasons to seek urgent or emergent medical care as well as reasons to return to the clinic for evaluation. Possible side effects, interactions and progression of symptoms discussed as well. Pt / family states understanding.   I recommend counseling , pt does not have a  to attend.   We will remain on our current medication regimen , I have encouraged her to reach out to family and friends, discussed nursing home placement which she will not consider at this time.   Refill Norco  mg one by mouth every six hours as needed # 90 no refills.   Proper body mechanics has been reviewed and discussed today.      Marques has been reviewed as consistent.  Uds is on file.   Controlled substance agreement reviewed.     Follow up in one month, sooner if needed. Routine labs every 3-6 months.     Errors in dictation may reflect use of voice recognition software and not all errors in transcription may have been detected prior to signing.                      This document has been electronically signed by:  CHARMAINE Doran, NP-C

## 2018-06-19 PROBLEM — F32.9 REACTIVE DEPRESSION: Status: ACTIVE | Noted: 2018-06-19

## 2018-06-19 RX ORDER — NIZATIDINE 150 MG/1
CAPSULE ORAL
Qty: 60 CAPSULE | Refills: 5 | Status: SHIPPED | OUTPATIENT
Start: 2018-06-19 | End: 2018-12-26 | Stop reason: SDUPTHER

## 2018-06-19 RX ORDER — DICLOFENAC SODIUM 75 MG/1
TABLET, DELAYED RELEASE ORAL
Qty: 60 TABLET | Refills: 5 | Status: SHIPPED | OUTPATIENT
Start: 2018-06-19 | End: 2018-12-26 | Stop reason: SDUPTHER

## 2018-06-19 RX ORDER — ESOMEPRAZOLE MAGNESIUM 40 MG/1
CAPSULE, DELAYED RELEASE ORAL
Qty: 30 CAPSULE | Refills: 5 | Status: SHIPPED | OUTPATIENT
Start: 2018-06-19 | End: 2018-08-24 | Stop reason: SDUPTHER

## 2018-06-19 RX ORDER — DULOXETIN HYDROCHLORIDE 60 MG/1
CAPSULE, DELAYED RELEASE ORAL
Qty: 30 CAPSULE | Refills: 3 | Status: SHIPPED | OUTPATIENT
Start: 2018-06-19 | End: 2018-10-29 | Stop reason: SDUPTHER

## 2018-06-19 RX ORDER — SIMVASTATIN 20 MG
20 TABLET ORAL NIGHTLY
Qty: 30 TABLET | Refills: 5 | Status: SHIPPED | OUTPATIENT
Start: 2018-06-19 | End: 2018-12-26 | Stop reason: SDUPTHER

## 2018-06-19 RX ORDER — LEVOCETIRIZINE DIHYDROCHLORIDE 5 MG/1
5 TABLET, FILM COATED ORAL EVERY EVENING
Qty: 30 TABLET | Refills: 5 | Status: SHIPPED | OUTPATIENT
Start: 2018-06-19 | End: 2018-12-26 | Stop reason: SDUPTHER

## 2018-07-24 ENCOUNTER — OFFICE VISIT (OUTPATIENT)
Dept: FAMILY MEDICINE CLINIC | Facility: CLINIC | Age: 48
End: 2018-07-24

## 2018-07-24 VITALS
HEIGHT: 65 IN | TEMPERATURE: 99.2 F | OXYGEN SATURATION: 97 % | SYSTOLIC BLOOD PRESSURE: 130 MMHG | WEIGHT: 173 LBS | BODY MASS INDEX: 28.82 KG/M2 | DIASTOLIC BLOOD PRESSURE: 80 MMHG | HEART RATE: 75 BPM

## 2018-07-24 DIAGNOSIS — J30.1 CHRONIC SEASONAL ALLERGIC RHINITIS DUE TO POLLEN: ICD-10-CM

## 2018-07-24 DIAGNOSIS — M51.36 DDD (DEGENERATIVE DISC DISEASE), LUMBAR: ICD-10-CM

## 2018-07-24 DIAGNOSIS — E53.8 VITAMIN B 12 DEFICIENCY: ICD-10-CM

## 2018-07-24 DIAGNOSIS — J06.9 ACUTE URI: Primary | ICD-10-CM

## 2018-07-24 PROCEDURE — 99214 OFFICE O/P EST MOD 30 MIN: CPT | Performed by: NURSE PRACTITIONER

## 2018-07-24 PROCEDURE — 96372 THER/PROPH/DIAG INJ SC/IM: CPT | Performed by: NURSE PRACTITIONER

## 2018-07-24 RX ORDER — ATENOLOL 25 MG/1
25 TABLET ORAL DAILY
Qty: 30 TABLET | Refills: 5 | Status: SHIPPED | OUTPATIENT
Start: 2018-07-24 | End: 2018-12-26 | Stop reason: SDUPTHER

## 2018-07-24 RX ORDER — FLUTICASONE PROPIONATE 50 MCG
SPRAY, SUSPENSION (ML) NASAL
Qty: 16 G | Refills: 5 | Status: SHIPPED | OUTPATIENT
Start: 2018-07-24 | End: 2018-12-26 | Stop reason: SDUPTHER

## 2018-07-24 RX ORDER — CEFTRIAXONE 1 G/1
1 INJECTION, POWDER, FOR SOLUTION INTRAMUSCULAR; INTRAVENOUS ONCE
Status: COMPLETED | OUTPATIENT
Start: 2018-07-24 | End: 2018-07-24

## 2018-07-24 RX ORDER — HYDROCHLOROTHIAZIDE 25 MG/1
25 TABLET ORAL DAILY
Qty: 30 TABLET | Refills: 5 | Status: SHIPPED | OUTPATIENT
Start: 2018-07-24 | End: 2019-01-28 | Stop reason: SDUPTHER

## 2018-07-24 RX ORDER — METHYLPREDNISOLONE ACETATE 80 MG/ML
80 INJECTION, SUSPENSION INTRA-ARTICULAR; INTRALESIONAL; INTRAMUSCULAR; SOFT TISSUE ONCE
Status: COMPLETED | OUTPATIENT
Start: 2018-07-24 | End: 2018-07-24

## 2018-07-24 RX ORDER — GABAPENTIN 600 MG/1
600 TABLET ORAL 3 TIMES DAILY
Qty: 90 TABLET | Refills: 2 | Status: SHIPPED | OUTPATIENT
Start: 2018-07-24 | End: 2018-09-25 | Stop reason: SDUPTHER

## 2018-07-24 RX ORDER — KETOROLAC TROMETHAMINE 30 MG/ML
60 INJECTION, SOLUTION INTRAMUSCULAR; INTRAVENOUS ONCE
Status: COMPLETED | OUTPATIENT
Start: 2018-07-24 | End: 2018-07-24

## 2018-07-24 RX ORDER — LISINOPRIL 10 MG/1
10 TABLET ORAL DAILY
Qty: 30 TABLET | Refills: 5 | Status: SHIPPED | OUTPATIENT
Start: 2018-07-24 | End: 2018-12-26 | Stop reason: SDUPTHER

## 2018-07-24 RX ORDER — HYDROCODONE BITARTRATE AND ACETAMINOPHEN 10; 325 MG/1; MG/1
1 TABLET ORAL EVERY 6 HOURS PRN
Qty: 90 TABLET | Refills: 0 | Status: SHIPPED | OUTPATIENT
Start: 2018-07-24 | End: 2018-08-27 | Stop reason: SDUPTHER

## 2018-07-24 RX ADMIN — KETOROLAC TROMETHAMINE 60 MG: 30 INJECTION, SOLUTION INTRAMUSCULAR; INTRAVENOUS at 10:09

## 2018-07-24 RX ADMIN — CYANOCOBALAMIN 1000 MCG: 1000 INJECTION, SOLUTION INTRAMUSCULAR; SUBCUTANEOUS at 10:11

## 2018-07-24 RX ADMIN — METHYLPREDNISOLONE ACETATE 80 MG: 80 INJECTION, SUSPENSION INTRA-ARTICULAR; INTRALESIONAL; INTRAMUSCULAR; SOFT TISSUE at 10:10

## 2018-07-24 RX ADMIN — CEFTRIAXONE 1 G: 1 INJECTION, POWDER, FOR SOLUTION INTRAMUSCULAR; INTRAVENOUS at 10:06

## 2018-07-24 NOTE — PROGRESS NOTES
Subjective   Mirian Fischer is a 48 y.o. female.     Chief Complaint   Patient presents with   • Hypertension   • Osteoarthritis       History of Present Illness   Lumbar pain with radiculopathy into the right lower extremity.  Exacerbation with pain rated 9 on pain scale running at 0-10.  She currently receives Norco, Robaxin, Cymbalta and Neurontin.  She has been seen by neurology and has been recommended for spinal surgery.  She has a handicapped child that she is 100% responsible for and this is the reason she has not had surgery.  She is having acute exacerbation of pain today.  Patient reports her pain medication only lasts for approximately 2-3 hours.  She has no history of substance abuse or addiction.  Radiology is on file.  She's not been to physical therapy recently due to not having childcare.    Sinus infection symptoms ×1 week.  Low-grade fever.      The following portions of the patient's history were reviewed and updated as appropriate: allergies, current medications, past family history, past medical history, past social history, past surgical history and problem list.    Review of Systems   Constitutional: Negative for appetite change, fatigue and unexpected weight change.   HENT: Positive for ear pain, sinus pain and sinus pressure. Negative for congestion, nosebleeds, postnasal drip, rhinorrhea, sore throat, trouble swallowing and voice change.    Eyes: Negative for pain and visual disturbance.   Respiratory: Negative for cough, shortness of breath and wheezing.    Cardiovascular: Positive for leg swelling. Negative for chest pain and palpitations.   Gastrointestinal: Positive for abdominal pain, constipation and nausea. Negative for blood in stool and diarrhea.   Endocrine: Negative for cold intolerance and polydipsia.   Genitourinary: Negative for difficulty urinating, dysuria, flank pain and hematuria.   Musculoskeletal: Positive for back pain. Negative for arthralgias, gait problem, joint  "swelling and myalgias.   Skin: Negative for color change and rash.   Allergic/Immunologic: Negative.    Neurological: Negative for syncope, numbness and headaches.   Hematological: Negative.    Psychiatric/Behavioral: Negative for sleep disturbance and suicidal ideas.   All other systems reviewed and are negative.      Objective     /80   Pulse 75   Temp 99.2 °F (37.3 °C) (Tympanic)   Ht 165.1 cm (65\")   Wt 78.5 kg (173 lb)   SpO2 97%   BMI 28.79 kg/m²   Office Visit on 05/17/2018   Component Date Value Ref Range Status   • Glucose 05/17/2018 81  70 - 110 mg/dL Final   • BUN 05/17/2018 8  7 - 21 mg/dL Final   • Creatinine 05/17/2018 0.49  0.43 - 1.29 mg/dL Final   • eGFR Non African Am 05/17/2018 134  >60 mL/min/1.73 Final   • eGFR African Am 05/17/2018 >150  >60 mL/min/1.73 Final   • BUN/Creatinine Ratio 05/17/2018 16.3  7.0 - 25.0 Final   • Sodium 05/17/2018 135  135 - 153 mmol/L Final   • Potassium 05/17/2018 4.3  3.5 - 5.3 mmol/L Final   • Chloride 05/17/2018 107  99 - 112 mmol/L Final   • Total CO2 05/17/2018 27.5  24.3 - 31.9 mmol/L Final   • Calcium 05/17/2018 8.4  7.7 - 10.0 mg/dL Final   • Total Protein 05/17/2018 6.2  6.0 - 8.0 g/dL Final   • Albumin 05/17/2018 4.20  3.50 - 5.00 g/dL Final   • Globulin 05/17/2018 2.0  gm/dL Final   • A/G Ratio 05/17/2018 2.1  1.5 - 2.5 g/dL Final   • Total Bilirubin 05/17/2018 0.3  0.2 - 1.8 mg/dL Final   • Alkaline Phosphatase 05/17/2018 71  35 - 104 U/L Final   • AST (SGOT) 05/17/2018 15  10 - 30 U/L Final   • ALT (SGPT) 05/17/2018 19  10 - 36 U/L Final   • TSH 05/17/2018 1.845  0.550 - 4.780 mIU/mL Final   • Hemoglobin A1C 05/17/2018 5.50  4.50 - 5.70 % Final   • 25 Hydroxy, Vitamin D 05/17/2018 76.0  ng/ml Final   • WBC 05/17/2018 9.05  4.50 - 12.50 10*3/mm3 Final   • RBC 05/17/2018 4.53  4.20 - 5.40 10*6/mm3 Final   • Hemoglobin 05/17/2018 12.5  12.0 - 16.0 g/dL Final   • Hematocrit 05/17/2018 38.5  37.0 - 47.0 % Final   • MCV 05/17/2018 85.0  80.0 - 94.0 " fL Final   • MCH 05/17/2018 27.6  27.0 - 33.0 pg Final   • MCHC 05/17/2018 32.5* 33.0 - 37.0 g/dL Final   • RDW 05/17/2018 14.3  11.5 - 14.5 % Final   • Platelets 05/17/2018 231  130 - 400 10*3/mm3 Final   • Neutrophil Rel % 05/17/2018 52.3  30.0 - 70.0 % Final   • Lymphocyte Rel % 05/17/2018 33.4  21.0 - 51.0 % Final   • Monocyte Rel % 05/17/2018 8.5  0.0 - 10.0 % Final   • Eosinophil Rel % 05/17/2018 4.3  0.0 - 5.0 % Final   • Basophil Rel % 05/17/2018 1.2  0.0 - 2.0 % Final   • Neutrophils Absolute 05/17/2018 4.73  1.40 - 6.50 10*3/mm3 Final   • Lymphocytes Absolute 05/17/2018 3.02* 1.00 - 3.00 10*3/mm3 Final   • Monocytes Absolute 05/17/2018 0.77  0.10 - 0.90 10*3/mm3 Final   • Eosinophils Absolute 05/17/2018 0.39  0.00 - 0.70 10*3/mm3 Final   • Basophils Absolute 05/17/2018 0.11  0.00 - 0.30 10*3/mm3 Final   • Immature Granulocyte Rel % 05/17/2018 0.3  0.0 - 0.5 % Final   • Immature Grans Absolute 05/17/2018 0.03  0.00 - 0.03 10*3/mm3 Final       Physical Exam   Constitutional: She is oriented to person, place, and time. Vital signs are normal. She appears well-developed and well-nourished. She has a sickly appearance.   Appears acutely ill.  Patient appears acute pain    HENT:   Head: Normocephalic.   Right Ear: Hearing normal. No lacerations. No drainage or swelling. No foreign bodies. No mastoid tenderness. A middle ear effusion is present.   Left Ear: Hearing normal. No lacerations. No drainage or swelling. No foreign bodies. No mastoid tenderness. A middle ear effusion is present.   Nose: Mucosal edema and rhinorrhea present. No nose lacerations, sinus tenderness or nasal deformity. No epistaxis.  No foreign bodies.   Mouth/Throat: Uvula is midline. Oropharyngeal exudate and posterior oropharyngeal erythema present. No tonsillar abscesses.   TM's are cloudy bilateral   Eyes: Pupils are equal, round, and reactive to light. EOM are normal.   Neck: Normal range of motion. Neck supple. No thyromegaly present.    Cardiovascular: Normal rate, regular rhythm, normal heart sounds and intact distal pulses.    Pulmonary/Chest: Effort normal and breath sounds normal. No respiratory distress.   Cough noted    Abdominal: Soft. Bowel sounds are normal. She exhibits no distension. There is no tenderness. There is no guarding.   Musculoskeletal:        Lumbar back: She exhibits decreased range of motion, tenderness and spasm.   Lymphadenopathy:     She has cervical adenopathy.        Right cervical: Superficial cervical adenopathy present. No deep cervical adenopathy present.       Left cervical: Superficial cervical adenopathy present. No deep cervical adenopathy present.   Neurological: She is alert and oriented to person, place, and time.   Reflex Scores:       Patellar reflexes are 1+ on the right side and 1+ on the left side.  Skin: Skin is warm and dry. No rash noted.   Psychiatric: She has a normal mood and affect. Her behavior is normal. Judgment and thought content normal.       Assessment/Plan     Problem List Items Addressed This Visit        Digestive    Vitamin B 12 deficiency    Overview     Monthly b-12 injections            Musculoskeletal and Integument    DDD (degenerative disc disease), lumbar    Relevant Medications    HYDROcodone-acetaminophen (NORCO)  MG per tablet    ketorolac (TORADOL) injection 60 mg (Completed) (Start on 7/24/2018 10:30 AM)    methylPREDNISolone acetate (DEPO-medrol) injection 80 mg (Start on 7/24/2018 10:30 AM)    Other Relevant Orders    Ambulatory Referral to Pain Management (Completed)      Other Visit Diagnoses     Acute URI    -  Primary    Relevant Medications    cefTRIAXone (ROCEPHIN) injection 1 g (Completed) (Start on 7/24/2018 10:30 AM)        Increase Neurontin from 300 mg three times daily up to 600 mg three times daily.     Refill Norco 10-3 25 one by mouth every 6 hours when necessary #90 with no refill.  Again I have recommended patient attend physical therapy.  Patient  has been recommended to follow up with her neurologist, she reports that she does not have childcare and cannot follow up at this time.  Toradol and Depo-Medrol injection today.  Body mechanics reviewed.    Marques has been reviewed as consistent.  Uds is on file.   Goal: Improved quality of life and reduction in pain as evidenced by pt report.   Controlled substance agreement has been reviewed today.     b 12 injection today per standing order       Patient's Body mass index is 28.79 kg/m². BMI is above normal parameters. Recommendations include: exercise counseling and nutrition counseling.      I have discussed diagnosis in detail today allowing time for questions and answers. Pt is aware of reasons to seek urgent or emergent medical care as well as reasons to return to the clinic for evaluation. Possible side effects, interactions and progression of symptoms discussed as well. Pt / family states understanding.   Emotional support and active listening provided.     RTC 2-5 days if not improved, sooner if condition worsens/changes. Symptomatic care advised as well as reasons for urgent or emergent care. Pt / family state understanding.     Routine follow up in one month, sooner if needed.         Errors in dictation may reflect use of voice recognition software and not all errors in transcription may have been detected prior to signing.       This document has been electronically signed by:  CHARMAINE Doran, NP-C

## 2018-08-25 RX ORDER — ESOMEPRAZOLE MAGNESIUM 40 MG/1
CAPSULE, DELAYED RELEASE ORAL
Qty: 30 CAPSULE | Refills: 5 | Status: SHIPPED | OUTPATIENT
Start: 2018-08-25 | End: 2018-12-26 | Stop reason: SDUPTHER

## 2018-08-27 ENCOUNTER — OFFICE VISIT (OUTPATIENT)
Dept: FAMILY MEDICINE CLINIC | Facility: CLINIC | Age: 48
End: 2018-08-27

## 2018-08-27 VITALS
OXYGEN SATURATION: 98 % | TEMPERATURE: 97.9 F | SYSTOLIC BLOOD PRESSURE: 130 MMHG | BODY MASS INDEX: 28.66 KG/M2 | HEART RATE: 82 BPM | DIASTOLIC BLOOD PRESSURE: 80 MMHG | HEIGHT: 65 IN | WEIGHT: 172 LBS

## 2018-08-27 DIAGNOSIS — G56.20 CUBITAL TUNNEL SYNDROME, UNSPECIFIED LATERALITY: ICD-10-CM

## 2018-08-27 DIAGNOSIS — H61.23 EXCESSIVE CERUMEN IN BOTH EAR CANALS: ICD-10-CM

## 2018-08-27 DIAGNOSIS — M48.062 SPINAL STENOSIS, LUMBAR REGION, WITH NEUROGENIC CLAUDICATION: ICD-10-CM

## 2018-08-27 DIAGNOSIS — E53.8 VITAMIN B 12 DEFICIENCY: ICD-10-CM

## 2018-08-27 DIAGNOSIS — R30.0 DYSURIA: ICD-10-CM

## 2018-08-27 DIAGNOSIS — J32.0 MAXILLARY SINUSITIS, UNSPECIFIED CHRONICITY: ICD-10-CM

## 2018-08-27 DIAGNOSIS — Z30.9 ENCOUNTER FOR CONTRACEPTIVE MANAGEMENT, UNSPECIFIED TYPE: Primary | ICD-10-CM

## 2018-08-27 DIAGNOSIS — M51.36 DDD (DEGENERATIVE DISC DISEASE), LUMBAR: ICD-10-CM

## 2018-08-27 LAB
B-HCG UR QL: NEGATIVE
BILIRUB BLD-MCNC: NEGATIVE MG/DL
CLARITY, POC: ABNORMAL
COLOR UR: YELLOW
GLUCOSE UR STRIP-MCNC: NEGATIVE MG/DL
INTERNAL NEGATIVE CONTROL: NEGATIVE
INTERNAL POSITIVE CONTROL: POSITIVE
KETONES UR QL: NEGATIVE
LEUKOCYTE EST, POC: NEGATIVE
Lab: NORMAL
NITRITE UR-MCNC: NEGATIVE MG/ML
PH UR: 6.5 [PH] (ref 5–8)
PROT UR STRIP-MCNC: NEGATIVE MG/DL
RBC # UR STRIP: NEGATIVE /UL
SP GR UR: 1.01 (ref 1–1.03)
UROBILINOGEN UR QL: NORMAL

## 2018-08-27 PROCEDURE — 81003 URINALYSIS AUTO W/O SCOPE: CPT | Performed by: NURSE PRACTITIONER

## 2018-08-27 PROCEDURE — 69210 REMOVE IMPACTED EAR WAX UNI: CPT | Performed by: NURSE PRACTITIONER

## 2018-08-27 PROCEDURE — 99214 OFFICE O/P EST MOD 30 MIN: CPT | Performed by: NURSE PRACTITIONER

## 2018-08-27 PROCEDURE — 81025 URINE PREGNANCY TEST: CPT | Performed by: NURSE PRACTITIONER

## 2018-08-27 PROCEDURE — 96372 THER/PROPH/DIAG INJ SC/IM: CPT | Performed by: NURSE PRACTITIONER

## 2018-08-27 RX ORDER — AMOXICILLIN 500 MG/1
500 CAPSULE ORAL 3 TIMES DAILY
Qty: 30 CAPSULE | Refills: 0 | Status: SHIPPED | OUTPATIENT
Start: 2018-08-27 | End: 2018-09-17

## 2018-08-27 RX ORDER — MEDROXYPROGESTERONE ACETATE 150 MG/ML
150 INJECTION, SUSPENSION INTRAMUSCULAR ONCE
Status: COMPLETED | OUTPATIENT
Start: 2018-08-27 | End: 2018-08-27

## 2018-08-27 RX ORDER — CEFTRIAXONE 1 G/1
1 INJECTION, POWDER, FOR SOLUTION INTRAMUSCULAR; INTRAVENOUS ONCE
Status: COMPLETED | OUTPATIENT
Start: 2018-08-27 | End: 2018-08-27

## 2018-08-27 RX ORDER — HYDROCODONE BITARTRATE AND ACETAMINOPHEN 10; 325 MG/1; MG/1
1 TABLET ORAL EVERY 6 HOURS PRN
Qty: 90 TABLET | Refills: 0 | Status: SHIPPED | OUTPATIENT
Start: 2018-08-27 | End: 2018-09-25 | Stop reason: SDUPTHER

## 2018-08-27 RX ORDER — METHYLPREDNISOLONE ACETATE 40 MG/ML
40 INJECTION, SUSPENSION INTRA-ARTICULAR; INTRALESIONAL; INTRAMUSCULAR; SOFT TISSUE ONCE
Status: COMPLETED | OUTPATIENT
Start: 2018-08-27 | End: 2018-08-27

## 2018-08-27 RX ADMIN — CEFTRIAXONE 1 G: 1 INJECTION, POWDER, FOR SOLUTION INTRAMUSCULAR; INTRAVENOUS at 15:05

## 2018-08-27 RX ADMIN — METHYLPREDNISOLONE ACETATE 40 MG: 40 INJECTION, SUSPENSION INTRA-ARTICULAR; INTRALESIONAL; INTRAMUSCULAR; SOFT TISSUE at 15:05

## 2018-08-27 RX ADMIN — CYANOCOBALAMIN 1000 MCG: 1000 INJECTION, SOLUTION INTRAMUSCULAR; SUBCUTANEOUS at 14:29

## 2018-08-27 RX ADMIN — MEDROXYPROGESTERONE ACETATE 150 MG: 150 INJECTION, SUSPENSION INTRAMUSCULAR at 14:31

## 2018-08-27 NOTE — PROGRESS NOTES
Subjective   Mirian Fischer is a 48 y.o. female.     Chief Complaint   Patient presents with   • URI   • Osteoarthritis   • Hypertension       History of Present Illness     Cough and sinus drainage present ×1 week.  Thick nasal drainage.  Negative cough.  Ear drainage and pain.  Moderate in intensity.  Sinus pressure and headache.    Contraception-patient requesting Depo- Provera injection today     Lumbar pain with radiculopathy into the right lower extremity.  Exacerbation with pain rated 6 on pain scale running at 0-10.  She currently receives Norco, Robaxin, Cymbalta and Neurontin.  She has been seen by neurology and has been recommended for spinal surgery.  She has a handicapped child that she is 100% responsible for and this is the reason she has not had surgery.  She is having acute exacerbation of pain today.  Patient reports her pain medication only lasts for approximately 2-3 hours.  She has no history of substance abuse or addiction.  Radiology is on file.  She's not been to physical therapy recently due to not having childcare.  She was agreeable to a pain clinic consult and thought she may be able to obtain transportation if we could find one in the local [facility] area.  The only one within a reasonable transportation time is not accepting her insurance.  She continues to have numb and tingling feeling in her lower extremities and hands at times.  Patient states that she will call back and see Dr. Oliva in [facility] for additional spinal injections if this can be scheduled.    Hypertension-stable    B-12 deficiency-stable with monthly injections    The following portions of the patient's history were reviewed and updated as appropriate: allergies, current medications, past family history, past medical history, past social history, past surgical history and problem list.    Review of Systems   Constitutional: Positive for chills, fatigue and fever. Negative for activity change, appetite  "change and unexpected weight change.   HENT: Positive for ear discharge, ear pain, rhinorrhea, sinus pain, sinus pressure and sore throat. Negative for congestion, nosebleeds, postnasal drip, trouble swallowing and voice change.    Eyes: Negative for pain and visual disturbance.   Respiratory: Positive for cough and shortness of breath. Negative for wheezing.    Cardiovascular: Negative for chest pain and palpitations.   Gastrointestinal: Negative for abdominal pain, blood in stool, constipation and diarrhea.   Endocrine: Negative for cold intolerance and polydipsia.   Genitourinary: Negative for difficulty urinating, dysuria, flank pain and hematuria.   Musculoskeletal: Positive for arthralgias, back pain, myalgias and neck pain. Negative for gait problem, joint swelling and neck stiffness.   Skin: Negative for color change and rash.   Allergic/Immunologic: Negative.    Neurological: Positive for weakness (lower ext) and numbness. Negative for syncope and headaches.   Hematological: Negative.    Psychiatric/Behavioral: Negative for confusion, dysphoric mood, sleep disturbance and suicidal ideas. The patient is not nervous/anxious.    All other systems reviewed and are negative.      Objective     /80   Pulse 82   Temp 97.9 °F (36.6 °C) (Tympanic)   Ht 165.1 cm (65\")   Wt 78 kg (172 lb)   SpO2 98%   BMI 28.62 kg/m²   Office Visit on 05/17/2018   Component Date Value Ref Range Status   • Glucose 05/17/2018 81  70 - 110 mg/dL Final   • BUN 05/17/2018 8  7 - 21 mg/dL Final   • Creatinine 05/17/2018 0.49  0.43 - 1.29 mg/dL Final   • eGFR Non African Am 05/17/2018 134  >60 mL/min/1.73 Final   • eGFR African Am 05/17/2018 >150  >60 mL/min/1.73 Final   • BUN/Creatinine Ratio 05/17/2018 16.3  7.0 - 25.0 Final   • Sodium 05/17/2018 135  135 - 153 mmol/L Final   • Potassium 05/17/2018 4.3  3.5 - 5.3 mmol/L Final   • Chloride 05/17/2018 107  99 - 112 mmol/L Final   • Total CO2 05/17/2018 27.5  24.3 - 31.9 mmol/L " Final   • Calcium 05/17/2018 8.4  7.7 - 10.0 mg/dL Final   • Total Protein 05/17/2018 6.2  6.0 - 8.0 g/dL Final   • Albumin 05/17/2018 4.20  3.50 - 5.00 g/dL Final   • Globulin 05/17/2018 2.0  gm/dL Final   • A/G Ratio 05/17/2018 2.1  1.5 - 2.5 g/dL Final   • Total Bilirubin 05/17/2018 0.3  0.2 - 1.8 mg/dL Final   • Alkaline Phosphatase 05/17/2018 71  35 - 104 U/L Final   • AST (SGOT) 05/17/2018 15  10 - 30 U/L Final   • ALT (SGPT) 05/17/2018 19  10 - 36 U/L Final   • TSH 05/17/2018 1.845  0.550 - 4.780 mIU/mL Final   • Hemoglobin A1C 05/17/2018 5.50  4.50 - 5.70 % Final   • 25 Hydroxy, Vitamin D 05/17/2018 76.0  ng/ml Final   • WBC 05/17/2018 9.05  4.50 - 12.50 10*3/mm3 Final   • RBC 05/17/2018 4.53  4.20 - 5.40 10*6/mm3 Final   • Hemoglobin 05/17/2018 12.5  12.0 - 16.0 g/dL Final   • Hematocrit 05/17/2018 38.5  37.0 - 47.0 % Final   • MCV 05/17/2018 85.0  80.0 - 94.0 fL Final   • MCH 05/17/2018 27.6  27.0 - 33.0 pg Final   • MCHC 05/17/2018 32.5* 33.0 - 37.0 g/dL Final   • RDW 05/17/2018 14.3  11.5 - 14.5 % Final   • Platelets 05/17/2018 231  130 - 400 10*3/mm3 Final   • Neutrophil Rel % 05/17/2018 52.3  30.0 - 70.0 % Final   • Lymphocyte Rel % 05/17/2018 33.4  21.0 - 51.0 % Final   • Monocyte Rel % 05/17/2018 8.5  0.0 - 10.0 % Final   • Eosinophil Rel % 05/17/2018 4.3  0.0 - 5.0 % Final   • Basophil Rel % 05/17/2018 1.2  0.0 - 2.0 % Final   • Neutrophils Absolute 05/17/2018 4.73  1.40 - 6.50 10*3/mm3 Final   • Lymphocytes Absolute 05/17/2018 3.02* 1.00 - 3.00 10*3/mm3 Final   • Monocytes Absolute 05/17/2018 0.77  0.10 - 0.90 10*3/mm3 Final   • Eosinophils Absolute 05/17/2018 0.39  0.00 - 0.70 10*3/mm3 Final   • Basophils Absolute 05/17/2018 0.11  0.00 - 0.30 10*3/mm3 Final   • Immature Granulocyte Rel % 05/17/2018 0.3  0.0 - 0.5 % Final   • Immature Grans Absolute 05/17/2018 0.03  0.00 - 0.03 10*3/mm3 Final       Physical Exam   Constitutional: She is oriented to person, place, and time. Vital signs are normal.  She appears well-developed and well-nourished. She has a sickly appearance. No distress.   Appears acutely ill.  Patient appears acute pain    HENT:   Head: Normocephalic and atraumatic.   Right Ear: Hearing normal. No lacerations. There is drainage. No swelling. No foreign bodies. No mastoid tenderness. Tympanic membrane is not perforated. A middle ear effusion is present.   Left Ear: Hearing normal. No lacerations. There is drainage. No swelling. No foreign bodies. No mastoid tenderness. Tympanic membrane is not perforated. A middle ear effusion is present.   Nose: Mucosal edema and rhinorrhea present. No nose lacerations, sinus tenderness or nasal deformity. No epistaxis.  No foreign bodies. Right sinus exhibits maxillary sinus tenderness and frontal sinus tenderness. Left sinus exhibits maxillary sinus tenderness and frontal sinus tenderness.   Mouth/Throat: Uvula is midline and mucous membranes are normal. Oropharyngeal exudate and posterior oropharyngeal erythema present. No tonsillar abscesses.   TM's are cloudy bilateral.  Bilateral ear canals impacted with cerumen, removed via provider with flexi-loop. Honey colored cerumen removed, tolerated well.      Eyes: Pupils are equal, round, and reactive to light. Conjunctivae and EOM are normal. Right eye exhibits no discharge. Left eye exhibits no discharge.   Neck: Normal range of motion. Neck supple. No thyromegaly present.   Cardiovascular: Normal rate, regular rhythm, normal heart sounds and intact distal pulses.    Pulmonary/Chest: Effort normal and breath sounds normal. No respiratory distress.   Cough noted    Abdominal: Soft. Bowel sounds are normal. She exhibits no distension. There is no tenderness. There is no guarding.   Musculoskeletal:        Lumbar back: She exhibits decreased range of motion, tenderness and spasm.   Lymphadenopathy:     She has cervical adenopathy.        Right cervical: Superficial cervical adenopathy present. No deep cervical  adenopathy present.       Left cervical: Superficial cervical adenopathy present. No deep cervical adenopathy present.   Neurological: She is alert and oriented to person, place, and time. She has normal strength. No sensory deficit.   Reflex Scores:       Patellar reflexes are 1+ on the right side and 1+ on the left side.  Skin: Skin is warm and dry. Capillary refill takes less than 2 seconds. No rash noted. She is not diaphoretic. No erythema.   Psychiatric: She has a normal mood and affect. Her speech is normal and behavior is normal. Judgment and thought content normal. Cognition and memory are normal.   Vitals reviewed.      Assessment/Plan     Problem List Items Addressed This Visit        Digestive    Vitamin B 12 deficiency    Overview     Monthly b-12 injections            Nervous and Auditory    Cubital tunnel syndrome    Relevant Orders    EMG & Nerve Conduction Test    Excessive cerumen in both ear canals       Musculoskeletal and Integument    DDD (degenerative disc disease), lumbar    Relevant Medications    HYDROcodone-acetaminophen (NORCO)  MG per tablet    Other Relevant Orders    Urine Drug Screen - Urine, Clean Catch       Other    Spinal stenosis, lumbar region, with neurogenic claudication    Overview     Pre-surgical planning w/u         Relevant Orders    EMG & Nerve Conduction Test    Encounter for contraceptive management - Primary    Relevant Medications    MedroxyPROGESTERone Acetate (DEPO-PROVERA) injection 150 mg (Completed)    Other Relevant Orders    POCT pregnancy, urine (Completed)      Other Visit Diagnoses     Dysuria        Relevant Orders    POC Urinalysis Dipstick (Completed)    Maxillary sinusitis, unspecified chronicity        Relevant Medications    amoxicillin (AMOXIL) 500 MG capsule    loratadine-pseudoephedrine (CLARITIN-D 12 HOUR) 5-120 MG per 12 hr tablet    cefTRIAXone (ROCEPHIN) injection 1 g (Completed)    methylPREDNISolone acetate (DEPO-medrol) injection 40 mg  (Completed)              Current Outpatient Prescriptions:   •  atenolol (TENORMIN) 25 MG tablet, TAKE 1 TABLET BY MOUTH DAILY., Disp: 30 tablet, Rfl: 5  •  diclofenac (VOLTAREN) 75 MG EC tablet, TAKE ONE TABLET BY MOUTH TWO TIMES A DAY, Disp: 60 tablet, Rfl: 5  •  DULoxetine (CYMBALTA) 60 MG capsule, TAKE 1 CAPSULE BY MOUTH DAILY., Disp: 30 capsule, Rfl: 3  •  esomeprazole (nexIUM) 40 MG capsule, TAKE ONE CAPSULE BY MOUTH EVERY MORNING BEFORE BREAKFAST, Disp: 30 capsule, Rfl: 5  •  fluticasone (FLONASE) 50 MCG/ACT nasal spray, USE 1 SPRAY IN EACH NOSTRIL DAILY, Disp: 16 g, Rfl: 5  •  gabapentin (NEURONTIN) 600 MG tablet, Take 1 tablet by mouth 3 (Three) Times a Day., Disp: 90 tablet, Rfl: 2  •  hydrochlorothiazide (HYDRODIURIL) 25 MG tablet, TAKE 1 TABLET BY MOUTH DAILY., Disp: 30 tablet, Rfl: 5  •  HYDROcodone-acetaminophen (NORCO)  MG per tablet, Take 1 tablet by mouth Every 6 (Six) Hours As Needed for Moderate Pain ., Disp: 90 tablet, Rfl: 0  •  Lactobacillus-Inulin ([facility]) capsule, Take 1 capsule by mouth 2 (Two) Times a Day., Disp: 60 capsule, Rfl: 5  •  levocetirizine (XYZAL) 5 MG tablet, TAKE 1 TABLET BY MOUTH EVERY EVENING., Disp: 30 tablet, Rfl: 5  •  linaclotide (LINZESS) 290 MCG capsule capsule, Take 1 capsule by mouth Every Morning Before Breakfast., Disp: 30 capsule, Rfl: 5  •  lisinopril (PRINIVIL,ZESTRIL) 10 MG tablet, TAKE 1 TABLET BY MOUTH DAILY., Disp: 30 tablet, Rfl: 5  •  medroxyPROGESTERone (DEPO-PROVERA) 150 MG/ML injection, Inject 1 mL into the shoulder, thigh, or buttocks Every 3 (Three) Months., Disp: 1 mL, Rfl: 3  •  Menthol, Topical Analgesic, (BIOFREEZE) 4 % gel, Apply 1 application topically 2 (Two) Times a Day., Disp: 1 tube, Rfl: 5  •  methocarbamol (ROBAXIN) 750 MG tablet, Take 1 tablet by mouth 2 (Two) Times a Day., Disp: 60 tablet, Rfl: 5  •  Multiple Vitamins-Minerals (MULTI-VITAMIN GUMMIES) chewable tablet, Chew 2 each Daily., Disp: 100 tablet, Rfl:  5  •  nizatidine (AXID) 150 MG capsule, TAKE ONE CAPSULE BY MOUTH TWO TIMES A DAY, Disp: 60 capsule, Rfl: 5  •  ondansetron (ZOFRAN) 4 MG tablet, Take 4 mg by mouth Every 6 (Six) Hours As Needed for Nausea or Vomiting., Disp: , Rfl:   •  potassium chloride (K-DUR,KLOR-CON) 10 MEQ CR tablet, TAKE ONE TABLET BY MOUTH TWO TIMES A DAY, Disp: 60 tablet, Rfl: 5  •  simvastatin (ZOCOR) 20 MG tablet, TAKE 1 TABLET BY MOUTH EVERY NIGHT., Disp: 30 tablet, Rfl: 5  •  vitamin D (ERGOCALCIFEROL) 63878 units capsule capsule, Take 1 capsule by mouth Every 7 (Seven) Days., Disp: 4 capsule, Rfl: 5  •  amoxicillin (AMOXIL) 500 MG capsule, Take 1 capsule by mouth 3 (Three) Times a Day., Disp: 30 capsule, Rfl: 0  •  loratadine-pseudoephedrine (CLARITIN-D 12 HOUR) 5-120 MG per 12 hr tablet, Take 1 tablet by mouth 2 (Two) Times a Day., Disp: 10 tablet, Rfl: 1    Current Facility-Administered Medications:   •  cyanocobalamin injection 1,000 mcg, 1,000 mcg, Intramuscular, Q28 Days, Jaleesa Contreras, CHARMAINE, 1,000 mcg at 08/27/18 1429      Marques has been reviewed as consistent.  Uds is on file.   Norco 10-1 by mouth every 6 hours when necessary #90 with no refill.  Goal: Improved quality of life and reduction in pain as evidenced by pt report.   Will order a NCS of all extremities.  I have asked the medical staff to contact Dr. Oliva and schedule an appointment for some joint/spinal injections.  I have also asked the staff to notify me of when this appointment is scheduled so I can call the patient myself with the appointment time and place.  Proper body mechanics has been reviewed and discussed today.  Controlled substance agreement has been reviewed today.       Urine pregnancy test negative  Administered Depo-Provera per patient's apply  Tolerated B-12 injection, Rocephin injection and Depo-Medrol injections well.    Fluids, rest, symptomatic treatment advised. Steam therapy. Dispose of tooth bush after 24 hours of starting  antibiotics if ordered. Complete antibiotics as ordered.  Discussed possible side effects/interactions of medication. Discussed symptoms to report as well as reasons to seek urgent or emergent medical attention. Understanding stated.   Recommend follow up in 2-3 days if not improved, sooner if needed.      Patient's Body mass index is 28.62 kg/m². BMI is above normal parameters. Recommendations include: exercise counseling and nutrition counseling.    I advised Mirian of the risks of continuing to use tobacco, and I provided her with tobacco cessation educational materials in the After Visit Summary.     During this visit, I spent 3 minutes counseling the patient regarding tobacco cessation.    I have discussed diagnosis in detail today allowing time for questions and answers. Pt is aware of reasons to seek urgent or emergent medical care as well as reasons to return to the clinic for evaluation. Possible side effects, interactions and progression of symptoms discussed as well. Pt / family states understanding.   Emotional support and active listening provided.     Follow up in one month, sooner if needed. Routine labs every 3-6 months.     Errors in dictation may reflect use of voice recognition software and not all errors in transcription may have been detected prior to signing.           This document has been electronically signed by:  CHARMAINE Doran, NP-C

## 2018-08-28 RX ORDER — ERGOCALCIFEROL 1.25 MG/1
CAPSULE ORAL
Qty: 4 CAPSULE | Refills: 5 | Status: SHIPPED | OUTPATIENT
Start: 2018-08-28 | End: 2018-12-26 | Stop reason: SDUPTHER

## 2018-09-17 RX ORDER — SULFAMETHOXAZOLE AND TRIMETHOPRIM 800; 160 MG/1; MG/1
1 TABLET ORAL 2 TIMES DAILY
Qty: 20 TABLET | Refills: 0 | Status: SHIPPED | OUTPATIENT
Start: 2018-09-17 | End: 2018-09-25

## 2018-09-19 ENCOUNTER — TELEPHONE (OUTPATIENT)
Dept: FAMILY MEDICINE CLINIC | Facility: CLINIC | Age: 48
End: 2018-09-19

## 2018-09-19 NOTE — TELEPHONE ENCOUNTER
----- Message from CHARMAINE Dejesus sent at 9/6/2018  7:15 PM EDT -----  Thank you , can we also send the patient a letter reminding her of this and flag her chart so we can remind her.     ----- Message -----  From: Moon Parsons MA  Sent: 9/5/2018   3:39 PM  To: CHARMAINE Dejesus    She cancelled on 05/17 and rescheduled for 05/29/2018 and [facility]. They scheduled her for 09- @ 12:40.      ----- Message -----  From: Jaleesa Contreras APRN  Sent: 8/27/2018   3:06 PM  To: Cat Miranda [facility]    Please get this patient an appointment with a Dr. Oliva at the [facility] for spine injections.  She has seen him before.  This is not the same physician that is with the other [place] neurologist.  When you get this appointment set up with me know and I will call her personally with this appointment.  She also find out if she has missed appointments with this provider.    Thank you.

## 2018-09-25 ENCOUNTER — OFFICE VISIT (OUTPATIENT)
Dept: FAMILY MEDICINE CLINIC | Facility: CLINIC | Age: 48
End: 2018-09-25

## 2018-09-25 VITALS
OXYGEN SATURATION: 98 % | BODY MASS INDEX: 28.66 KG/M2 | SYSTOLIC BLOOD PRESSURE: 130 MMHG | WEIGHT: 172 LBS | HEART RATE: 92 BPM | TEMPERATURE: 97.4 F | DIASTOLIC BLOOD PRESSURE: 80 MMHG | HEIGHT: 65 IN

## 2018-09-25 DIAGNOSIS — M51.26 HNP (HERNIATED NUCLEUS PULPOSUS), LUMBAR: ICD-10-CM

## 2018-09-25 DIAGNOSIS — M51.36 DDD (DEGENERATIVE DISC DISEASE), LUMBAR: ICD-10-CM

## 2018-09-25 DIAGNOSIS — M51.36 BULGE OF LUMBAR DISC WITHOUT MYELOPATHY: Primary | ICD-10-CM

## 2018-09-25 PROCEDURE — 99214 OFFICE O/P EST MOD 30 MIN: CPT | Performed by: NURSE PRACTITIONER

## 2018-09-25 PROCEDURE — 96372 THER/PROPH/DIAG INJ SC/IM: CPT | Performed by: NURSE PRACTITIONER

## 2018-09-25 RX ORDER — HYDROCODONE BITARTRATE AND ACETAMINOPHEN 10; 325 MG/1; MG/1
1 TABLET ORAL EVERY 6 HOURS PRN
Qty: 90 TABLET | Refills: 0 | Status: SHIPPED | OUTPATIENT
Start: 2018-09-25 | End: 2018-10-15 | Stop reason: SDUPTHER

## 2018-09-25 RX ORDER — GABAPENTIN 600 MG/1
600 TABLET ORAL 3 TIMES DAILY
Qty: 90 TABLET | Refills: 2 | Status: SHIPPED | OUTPATIENT
Start: 2018-09-25 | End: 2018-11-26 | Stop reason: SDUPTHER

## 2018-09-25 RX ORDER — METHOCARBAMOL 750 MG/1
750 TABLET, FILM COATED ORAL 2 TIMES DAILY
Qty: 60 TABLET | Refills: 5 | Status: SHIPPED | OUTPATIENT
Start: 2018-09-25 | End: 2018-12-26 | Stop reason: SDUPTHER

## 2018-09-25 RX ADMIN — CYANOCOBALAMIN 1000 MCG: 1000 INJECTION, SOLUTION INTRAMUSCULAR; SUBCUTANEOUS at 11:07

## 2018-09-25 NOTE — PROGRESS NOTES
Subjective   Mirian Fischer is a 48 y.o. female.     Chief Complaint   Patient presents with   • Hypertension   • Diabetes   • Osteoarthritis       History of Present Illness     Chronic low back pain.  Patient rates her pain is 3 on pain scale rating of 0-10 today.  She has taken her Norco this morning.  Pain was a 9 on pain scale rating of 0-10 when she awoke today.  Patient has been seen by neurology in the past.  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  Patient describes her pain as sharp and aching in her low back with radiation into her right hip and lower extremity.  She continues to receive Cymbalta which is helpful.  She does use Norco for her chronic pain.  Patient has been compliant with Marques and urine drug screen.  She has no history of substance abuse or addiction.  Radiology is on file.    B 12 deficiency-stable with monthly injections       Hypertension - stable    Diabetes-controlled with weight loss and lifestyle changes.      The following portions of the patient's history were reviewed and updated as appropriate: allergies, current medications, past family history, past medical history, past social history, past surgical history and problem list.    Review of Systems   Constitutional: Negative for appetite change, fatigue and unexpected weight change.   HENT: Negative for congestion, ear pain, nosebleeds, postnasal drip, rhinorrhea, sinus pain, sinus pressure, sore throat, trouble swallowing and voice change.    Eyes: Negative for pain and visual disturbance.   Respiratory: Negative for cough, shortness of breath and wheezing.    Cardiovascular: Negative for chest pain and palpitations.   Gastrointestinal: Negative for abdominal pain, blood in stool, constipation and diarrhea.   Endocrine: Negative for cold intolerance and polydipsia.   Genitourinary: Negative for difficulty urinating, dysuria,  "flank pain and hematuria.   Musculoskeletal: Positive for arthralgias and back pain. Negative for gait problem, joint swelling and myalgias.   Skin: Negative for color change and rash.   Allergic/Immunologic: Negative.    Neurological: Negative for syncope, numbness and headaches.   Hematological: Negative.    Psychiatric/Behavioral: Negative for dysphoric mood, sleep disturbance and suicidal ideas. The patient is not nervous/anxious.    All other systems reviewed and are negative.      Objective     /80   Pulse 92   Temp 97.4 °F (36.3 °C) (Tympanic)   Ht 165.1 cm (65\")   Wt 78 kg (172 lb)   SpO2 98%   BMI 28.62 kg/m²   Office Visit on 08/27/2018   Component Date Value Ref Range Status   • Color 08/27/2018 Yellow  Yellow, Straw, Dark Yellow, Aleta Final   • Clarity, UA 08/27/2018 Turbid* Clear Final   • Glucose, UA 08/27/2018 Negative  Negative, 1000 mg/dL (3+) mg/dL Final   • Bilirubin 08/27/2018 Negative  Negative Final   • Ketones, UA 08/27/2018 Negative  Negative Final   • Specific Gravity  08/27/2018 1.015  1.005 - 1.030 Final   • Blood, UA 08/27/2018 Negative  Negative Final   • pH, Urine 08/27/2018 6.5  5.0 - 8.0 Final   • Protein, POC 08/27/2018 Negative  Negative mg/dL Final   • Urobilinogen, UA 08/27/2018 Normal  Normal Final   • Leukocytes 08/27/2018 Negative  Negative Final   • Nitrite, UA 08/27/2018 Negative  Negative Final   • HCG, Urine, QL 08/27/2018 Negative  Negative Final   • Lot Number 08/27/2018 [id number]   Final   • Internal Positive Control 08/27/2018 Positive   Final   • Internal Negative Control 08/27/2018 Negative   Final       Physical Exam   Constitutional: She is oriented to person, place, and time. Vital signs are normal. She appears well-developed and well-nourished. No distress.   Pleasant and appropriate 48-year-old female.   HENT:   Head: Normocephalic.   Right Ear: Hearing, tympanic membrane, external ear and ear canal normal.   Left Ear: Hearing, tympanic membrane, " external ear and ear canal normal.   Nose: Nose normal.   Mouth/Throat: Oropharynx is clear and moist. No oropharyngeal exudate.   Eyes: Pupils are equal, round, and reactive to light. Conjunctivae are normal. Right eye exhibits no discharge. Left eye exhibits no discharge.   Neck: Normal range of motion. Neck supple. No tracheal deviation present. No thyromegaly present.   Cardiovascular: Normal rate, regular rhythm and normal heart sounds.  Exam reveals no gallop and no friction rub.    No murmur heard.  Pulmonary/Chest: Effort normal and breath sounds normal. No respiratory distress. She has no wheezes. She has no rales. She exhibits no tenderness.   Abdominal: Soft. Bowel sounds are normal. She exhibits no distension and no mass. There is no tenderness. There is no rebound and no guarding.   Musculoskeletal:        Lumbar back: She exhibits decreased range of motion, tenderness, pain and spasm.   Neurological: She is alert and oriented to person, place, and time. She is not disoriented. No cranial nerve deficit or sensory deficit. She exhibits abnormal muscle tone.   Reflex Scores:       Patellar reflexes are 1+ on the right side and 2+ on the left side.  CN 2-12 grossly intact    Skin: Skin is warm and dry. Capillary refill takes less than 2 seconds. No rash noted. She is not diaphoretic. No erythema.   Psychiatric: She has a normal mood and affect. Her behavior is normal. Judgment and thought content normal.   Vitals reviewed.      Assessment/Plan     Problem List Items Addressed This Visit        Musculoskeletal and Integument    HNP (herniated nucleus pulposus), lumbar    DDD (degenerative disc disease), lumbar    Relevant Medications    HYDROcodone-acetaminophen (NORCO)  MG per tablet    Bulge of lumbar disc without myelopathy - Primary    Overview     Degenerative disc changes in the lumbar disc spaces. There  is a grade 1 spondylolisthesis at L4-5 felt to be secondary to facet  joint arthritis.  There is  narrowing of the lateral recesses at L3-4 and  L4-5. The spinal stenosis is most severe at L4-5. There is a fairly  large disc herniation at L5-S1.               Marques has been reviewed as consistent.  Uds is on file.   Goal: Improved quality of life and reduction in pain as evidenced by pt report.   Proper body mechanics has been reviewed and discussed today.  Controlled substance agreement has been reviewed today.       Norco 10-3 25 mg 1 by mouth every 6 hours when necessary #90 with no refill.      Imaging results on file and reviewed today with patient.     Patient's Body mass index is 28.62 kg/m². BMI is above normal parameters. Recommendations include: exercise counseling and nutrition counseling.      I have discussed diagnosis in detail today allowing time for questions and answers. Pt is aware of reasons to seek urgent or emergent medical care as well as reasons to return to the clinic for evaluation. Possible side effects, interactions and progression of symptoms discussed as well. Pt / family states understanding.   Emotional support and active listening provided.     Follow up in one month, sooner if needed. Routine labs every 3-6 months.     Errors in dictation may reflect use of voice recognition software and not all errors in transcription may have been detected prior to signing.           This document has been electronically signed by:  CHARMAINE Doran, NP-C

## 2018-10-01 RX ORDER — POTASSIUM CHLORIDE 750 MG/1
TABLET, EXTENDED RELEASE ORAL
Qty: 60 TABLET | Refills: 5 | Status: SHIPPED | OUTPATIENT
Start: 2018-10-01 | End: 2018-12-26 | Stop reason: SDUPTHER

## 2018-10-01 RX ORDER — LINACLOTIDE 290 UG/1
290 CAPSULE, GELATIN COATED ORAL
Qty: 30 CAPSULE | Refills: 5 | Status: SHIPPED | OUTPATIENT
Start: 2018-10-01 | End: 2018-12-26 | Stop reason: SDUPTHER

## 2018-10-15 ENCOUNTER — OFFICE VISIT (OUTPATIENT)
Dept: FAMILY MEDICINE CLINIC | Facility: CLINIC | Age: 48
End: 2018-10-15

## 2018-10-15 VITALS
WEIGHT: 170 LBS | HEIGHT: 65 IN | TEMPERATURE: 98.5 F | OXYGEN SATURATION: 99 % | BODY MASS INDEX: 28.32 KG/M2 | SYSTOLIC BLOOD PRESSURE: 132 MMHG | DIASTOLIC BLOOD PRESSURE: 80 MMHG | HEART RATE: 99 BPM

## 2018-10-15 DIAGNOSIS — M51.26 HNP (HERNIATED NUCLEUS PULPOSUS), LUMBAR: Primary | ICD-10-CM

## 2018-10-15 DIAGNOSIS — Z72.0 TOBACCO USE: ICD-10-CM

## 2018-10-15 DIAGNOSIS — I10 ESSENTIAL HYPERTENSION: ICD-10-CM

## 2018-10-15 DIAGNOSIS — Z98.84 HX OF LAPAROSCOPIC GASTRIC BANDING: ICD-10-CM

## 2018-10-15 DIAGNOSIS — Z79.899 HIGH RISK MEDICATION USE: ICD-10-CM

## 2018-10-15 DIAGNOSIS — M15.9 PRIMARY OSTEOARTHRITIS INVOLVING MULTIPLE JOINTS: ICD-10-CM

## 2018-10-15 DIAGNOSIS — E53.8 VITAMIN B 12 DEFICIENCY: ICD-10-CM

## 2018-10-15 DIAGNOSIS — M51.36 BULGE OF LUMBAR DISC WITHOUT MYELOPATHY: ICD-10-CM

## 2018-10-15 DIAGNOSIS — E11.51 CONTROLLED TYPE 2 DM WITH PERIPHERAL CIRCULATORY DISORDER (HCC): ICD-10-CM

## 2018-10-15 DIAGNOSIS — Z23 ENCOUNTER FOR VACCINATION: ICD-10-CM

## 2018-10-15 DIAGNOSIS — M51.36 DDD (DEGENERATIVE DISC DISEASE), LUMBAR: ICD-10-CM

## 2018-10-15 PROBLEM — IMO0001 BLUES: Status: RESOLVED | Noted: 2017-11-28 | Resolved: 2018-10-15

## 2018-10-15 PROCEDURE — 99214 OFFICE O/P EST MOD 30 MIN: CPT | Performed by: NURSE PRACTITIONER

## 2018-10-15 PROCEDURE — 90686 IIV4 VACC NO PRSV 0.5 ML IM: CPT | Performed by: NURSE PRACTITIONER

## 2018-10-15 PROCEDURE — G0008 ADMIN INFLUENZA VIRUS VAC: HCPCS | Performed by: NURSE PRACTITIONER

## 2018-10-15 PROCEDURE — 96372 THER/PROPH/DIAG INJ SC/IM: CPT | Performed by: NURSE PRACTITIONER

## 2018-10-15 RX ORDER — HYDROCODONE BITARTRATE AND ACETAMINOPHEN 10; 325 MG/1; MG/1
1 TABLET ORAL EVERY 6 HOURS PRN
Qty: 90 TABLET | Refills: 0 | Status: SHIPPED | OUTPATIENT
Start: 2018-10-15 | End: 2018-11-26 | Stop reason: SDUPTHER

## 2018-10-15 RX ADMIN — CYANOCOBALAMIN 1000 MCG: 1000 INJECTION, SOLUTION INTRAMUSCULAR; SUBCUTANEOUS at 11:23

## 2018-10-15 NOTE — PROGRESS NOTES
Subjective   Mirian Fischer is a 48 y.o. female.     Chief Complaint   Patient presents with   • Hypertension   • Diabetes   • Osteoarthritis   • Back Pain       History of Present Illness     Chronic low back pain.  New complaint of worsening low back pain with radiation into both her hips and lower extremities.  Patient rates her pain is 3 on pain scale rating of 0-10 today.  She has taken her Norco this morning.  Pain was a 7 on pain scale rating of 0-10 when she awoke today.  Patient has been seen by neurology in the past.  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  Patient describes her pain as sharp and aching in her low back with radiation into her right hip and lower extremity.  She continues to receive Cymbalta which is helpful.  She does use Norco for her chronic pain.  Patient has been compliant with Marques and urine drug screen.  She has no history of substance abuse or addiction.  Radiology is on file.  MRI IMPRESSION:  Degenerative disc changes in the lumbar disc spaces. There  is a grade 1 spondylolisthesis at L4-5 felt to be secondary to facet  joint arthritis. There is  narrowing of the lateral recesses at L3-4 and  L4-5. The spinal stenosis is most severe at L4-5. There is a fairly  large disc herniation at L5-S1.    Obesity-history of bariatric surgery.  Patient had a lap band.  She has lost 2 more pounds this month.  She is under the care of bariatric surgeon for continued monitoring.      B 12 deficiency-stable with monthly injections         Hypertension - stable     Diabetes-controlled with weight loss and lifestyle changes.       Preventative health - his flu vaccine today.      The following portions of the patient's history were reviewed and updated as appropriate: allergies, current medications, past family history, past medical history, past social history, past surgical history and problem  "list.    Review of Systems   Constitutional: Positive for activity change. Negative for appetite change, chills, fatigue, fever and unexpected weight change.   HENT: Positive for sinus pain and sinus pressure. Negative for congestion, ear pain, nosebleeds, postnasal drip, rhinorrhea, sore throat, trouble swallowing and voice change.    Eyes: Negative for pain and visual disturbance.   Respiratory: Negative for cough, shortness of breath and wheezing.    Cardiovascular: Negative for chest pain and palpitations.   Gastrointestinal: Negative for abdominal pain, blood in stool, constipation and diarrhea.   Endocrine: Negative for cold intolerance and polydipsia.   Genitourinary: Negative for difficulty urinating, flank pain and hematuria.   Musculoskeletal: Positive for arthralgias and back pain. Negative for gait problem, joint swelling and myalgias.   Skin: Negative for color change and rash.   Allergic/Immunologic: Negative.    Neurological: Positive for numbness (lower extremities ). Negative for syncope and headaches.   Hematological: Negative.    Psychiatric/Behavioral: Negative for dysphoric mood, self-injury, sleep disturbance and suicidal ideas.   All other systems reviewed and are negative.      Objective     /80   Pulse 99   Temp 98.5 °F (36.9 °C) (Tympanic)   Ht 165.1 cm (65\")   Wt 77.1 kg (170 lb)   SpO2 99%   BMI 28.29 kg/m²   Office Visit on 08/27/2018   Component Date Value Ref Range Status   • Color 08/27/2018 Yellow  Yellow, Straw, Dark Yellow, Aleta Final   • Clarity, UA 08/27/2018 Turbid* Clear Final   • Glucose, UA 08/27/2018 Negative  Negative, 1000 mg/dL (3+) mg/dL Final   • Bilirubin 08/27/2018 Negative  Negative Final   • Ketones, UA 08/27/2018 Negative  Negative Final   • Specific Gravity  08/27/2018 1.015  1.005 - 1.030 Final   • Blood, UA 08/27/2018 Negative  Negative Final   • pH, Urine 08/27/2018 6.5  5.0 - 8.0 Final   • Protein, POC 08/27/2018 Negative  Negative mg/dL Final   • " Urobilinogen, UA 08/27/2018 Normal  Normal Final   • Leukocytes 08/27/2018 Negative  Negative Final   • Nitrite, UA 08/27/2018 Negative  Negative Final   • HCG, Urine, QL 08/27/2018 Negative  Negative Final   • Lot Number 08/27/2018 [id number]   Final   • Internal Positive Control 08/27/2018 Positive   Final   • Internal Negative Control 08/27/2018 Negative   Final       Physical Exam   Constitutional: She is oriented to person, place, and time. Vital signs are normal. She appears well-developed and well-nourished. No distress.   Pleasant and appropriate 48-year-old female.   HENT:   Head: Normocephalic.   Right Ear: Hearing, tympanic membrane, external ear and ear canal normal.   Left Ear: Hearing, tympanic membrane, external ear and ear canal normal.   Nose: Nose normal. Right sinus exhibits no maxillary sinus tenderness and no frontal sinus tenderness. Left sinus exhibits no maxillary sinus tenderness and no frontal sinus tenderness.   Mouth/Throat: Oropharynx is clear and moist. No oropharyngeal exudate.   Eyes: Pupils are equal, round, and reactive to light. Conjunctivae are normal. Right eye exhibits no discharge. Left eye exhibits no discharge.   Neck: Normal range of motion. Neck supple. No tracheal deviation present. No thyromegaly present.   Cardiovascular: Normal rate, regular rhythm and normal heart sounds.  Exam reveals no gallop and no friction rub.    No murmur heard.  Pulmonary/Chest: Effort normal and breath sounds normal. No respiratory distress. She has no wheezes. She has no rales. She exhibits no tenderness.   Abdominal: Soft. Bowel sounds are normal. She exhibits no distension and no mass. There is no tenderness. There is no rebound and no guarding.   Musculoskeletal:        Lumbar back: She exhibits decreased range of motion, tenderness, pain and spasm.   Neurological: She is alert and oriented to person, place, and time. She is not disoriented. No cranial nerve deficit or sensory deficit. She  exhibits abnormal muscle tone.   Reflex Scores:       Patellar reflexes are 1+ on the right side and 2+ on the left side.  CN 2-12 grossly intact    Skin: Skin is warm and dry. Capillary refill takes less than 2 seconds. No rash noted. She is not diaphoretic. No erythema.   Psychiatric: She has a normal mood and affect. Her behavior is normal. Judgment and thought content normal.   Vitals reviewed.      Assessment/Plan     Problem List Items Addressed This Visit        Cardiovascular and Mediastinum    Controlled type 2 DM with peripheral circulatory disorder (CMS/HCC)    Essential hypertension       Digestive    Vitamin B 12 deficiency    Overview     Monthly b-12 injections            Musculoskeletal and Integument    Osteoarthritis, multiple sites    HNP (herniated nucleus pulposus), lumbar - Primary    Relevant Orders    Ambulatory Referral to Neurosurgery    XR Spine Lumbar 2 or 3 View    XR Hips Bilateral With or Without Pelvis 2 View    DDD (degenerative disc disease), lumbar    Relevant Medications    HYDROcodone-acetaminophen (NORCO)  MG per tablet    Bulge of lumbar disc without myelopathy    Overview     Degenerative disc changes in the lumbar disc spaces. There  is a grade 1 spondylolisthesis at L4-5 felt to be secondary to facet  joint arthritis. There is  narrowing of the lateral recesses at L3-4 and  L4-5. The spinal stenosis is most severe at L4-5. There is a fairly  large disc herniation at L5-S1.            Other    Tobacco use    Hx of laparoscopic gastric banding    High risk medication use    Encounter for vaccination    Relevant Orders    Fluarix/Fluzone/Afluria/FluLaval ([id number]) (Completed)        X-rays will be obtained today at [facility] per patient request.  Marques has been reviewed as consistent.  Uds is on file.   Goal: Improved quality of life and reduction in pain as evidenced by pt report.   Proper body mechanics has been reviewed and discussed today.  As part  of this patient's treatment plan they are being prescribed controlled substance/substances with potential for abuse. This patient has been made aware of the appropriate use of such medications, including potential risk of somnolence, limited ability to drive and / or work safely, and potential for overdose. It has also been made clear that these medications are for use by this patient only, without concomitant use of alcohol or other substances unless prescribed/advised by medical provider. Patient has completed controlled substance agreement detailing terms of continued prescribing of controlled substances including monitoring Marques reports, drug screens and pill counts. The patient was asked and states they are not receiving narcotic medication from any there provider or any provider that this office has not been made aware of. History and physical exam exhibit continued safe and appropriate use of controlled substances.   Norco 10-3 25 one by mouth every 6 hours when necessary #90 with no refill.     Patient's Body mass index is 28.29 kg/m². BMI is above normal parameters. Recommendations include: nutrition counseling.    I advised Mirian of the risks of continuing to use tobacco, and I provided her with tobacco cessation educational materials in the After Visit Summary.     During this visit, I spent 3 minutes counseling the patient regarding tobacco cessation.    I have discussed diagnosis in detail today allowing time for questions and answers. Pt is aware of reasons to seek urgent or emergent medical care as well as reasons to return to the clinic for evaluation. Possible side effects, interactions and progression of symptoms discussed as well. Pt / family states understanding.   Emotional support and active listening provided.     Follow up in one month, sooner if needed. Routine labs every 3-6 months.       Errors in dictation may reflect use of voice recognition software and not all errors in  transcription may have been detected prior to signing.             This document has been electronically signed by:  CHARMAINE Doran, NP-C

## 2018-10-22 ENCOUNTER — TELEPHONE (OUTPATIENT)
Dept: FAMILY MEDICINE CLINIC | Facility: CLINIC | Age: 48
End: 2018-10-22

## 2018-10-22 NOTE — TELEPHONE ENCOUNTER
----- Message from CHARMAINE Dejesus sent at 10/15/2018  1:52 PM EDT -----  Call for xray results from [facility], should have done today     She has not went and had xray done

## 2018-10-29 RX ORDER — DULOXETIN HYDROCHLORIDE 60 MG/1
CAPSULE, DELAYED RELEASE ORAL
Qty: 30 CAPSULE | Refills: 3 | Status: SHIPPED | OUTPATIENT
Start: 2018-10-29 | End: 2018-12-26 | Stop reason: SDUPTHER

## 2018-11-26 ENCOUNTER — OFFICE VISIT (OUTPATIENT)
Dept: FAMILY MEDICINE CLINIC | Facility: CLINIC | Age: 48
End: 2018-11-26

## 2018-11-26 VITALS
BODY MASS INDEX: 29.82 KG/M2 | HEIGHT: 65 IN | DIASTOLIC BLOOD PRESSURE: 80 MMHG | SYSTOLIC BLOOD PRESSURE: 120 MMHG | OXYGEN SATURATION: 98 % | WEIGHT: 179 LBS | HEART RATE: 99 BPM | TEMPERATURE: 98 F

## 2018-11-26 DIAGNOSIS — M48.062 SPINAL STENOSIS, LUMBAR REGION, WITH NEUROGENIC CLAUDICATION: ICD-10-CM

## 2018-11-26 DIAGNOSIS — M51.36 DDD (DEGENERATIVE DISC DISEASE), LUMBAR: ICD-10-CM

## 2018-11-26 DIAGNOSIS — J06.9 ACUTE URI: ICD-10-CM

## 2018-11-26 DIAGNOSIS — Z30.42 ENCOUNTER FOR SURVEILLANCE OF INJECTABLE CONTRACEPTIVE: Primary | ICD-10-CM

## 2018-11-26 PROCEDURE — 99214 OFFICE O/P EST MOD 30 MIN: CPT | Performed by: NURSE PRACTITIONER

## 2018-11-26 PROCEDURE — 81025 URINE PREGNANCY TEST: CPT | Performed by: NURSE PRACTITIONER

## 2018-11-26 PROCEDURE — 96372 THER/PROPH/DIAG INJ SC/IM: CPT | Performed by: NURSE PRACTITIONER

## 2018-11-26 RX ORDER — MEDROXYPROGESTERONE ACETATE 150 MG/ML
150 INJECTION, SUSPENSION INTRAMUSCULAR ONCE
Status: COMPLETED | OUTPATIENT
Start: 2018-11-26 | End: 2018-11-26

## 2018-11-26 RX ORDER — GUAIFENESIN/DEXTROMETHORPHAN 100-10MG/5
5 SYRUP ORAL 3 TIMES DAILY PRN
Qty: 236 ML | Refills: 0 | Status: SHIPPED | OUTPATIENT
Start: 2018-11-26 | End: 2018-12-26

## 2018-11-26 RX ORDER — HYDROCODONE BITARTRATE AND ACETAMINOPHEN 10; 325 MG/1; MG/1
1 TABLET ORAL EVERY 6 HOURS PRN
Qty: 90 TABLET | Refills: 0 | Status: SHIPPED | OUTPATIENT
Start: 2018-11-26 | End: 2018-12-26 | Stop reason: SDUPTHER

## 2018-11-26 RX ORDER — GABAPENTIN 600 MG/1
600 TABLET ORAL 3 TIMES DAILY
Qty: 90 TABLET | Refills: 2 | Status: SHIPPED | OUTPATIENT
Start: 2018-11-26 | End: 2019-01-23 | Stop reason: SDUPTHER

## 2018-11-26 RX ORDER — CEFTRIAXONE 1 G/1
1 INJECTION, POWDER, FOR SOLUTION INTRAMUSCULAR; INTRAVENOUS ONCE
Status: COMPLETED | OUTPATIENT
Start: 2018-11-26 | End: 2018-11-26

## 2018-11-26 RX ORDER — CEPHALEXIN 500 MG/1
500 CAPSULE ORAL 3 TIMES DAILY
Qty: 30 CAPSULE | Refills: 0 | Status: SHIPPED | OUTPATIENT
Start: 2018-11-26 | End: 2018-12-06

## 2018-11-26 RX ADMIN — MEDROXYPROGESTERONE ACETATE 150 MG: 150 INJECTION, SUSPENSION INTRAMUSCULAR at 12:13

## 2018-11-26 RX ADMIN — CEFTRIAXONE 1 G: 1 INJECTION, POWDER, FOR SOLUTION INTRAMUSCULAR; INTRAVENOUS at 12:11

## 2018-11-26 RX ADMIN — CYANOCOBALAMIN 1000 MCG: 1000 INJECTION, SOLUTION INTRAMUSCULAR; SUBCUTANEOUS at 12:16

## 2018-11-26 NOTE — PROGRESS NOTES
Subjective   Mirian Fischer is a 48 y.o. female.     Chief Complaint   Patient presents with   • Osteoarthritis       History of Present Illness:    Pt has been ordered xrays of her back and hips. She has not been able to have completed as she does not have a sitter for her handicapped child.   She can not set up an appointment to see Dr. Teran for follow up until her new xrays are completed.     Lumbar bulging/degenerative disc disease with herniated nucleus pulposis of lumbar-patient has been under the care of neurology.  In the past she has had some spinal injections which were mildly helpful.  Patient has chronic pain which she rates 6 on pain scale rating of 0-10.  Patient has no history of substance abuse or addiction.  She does currently receiving Neurontin and Norco.  Neurontin is helpful with her radiculopathy.  She reports that Norco is helpful to decrease her symptoms and improve her quality of life.  Patient describes her pain as aching, throbbing and sharp with radiation for her low back into both lower extremities.  At times her lower extremities are numb.  It has been recommended that she have spinal surgery which patient declines as she does not have anyone to care for her handicapped child.    Substance abuse risk is moderate as she takes 11 controlled medication.    B-12 deficiency-patient is requesting a B12 injection today.  She takes monthly supplementation.    Contraceptive management-patient is due a Depo-Provera Provera injection today.    Acute upper respiratory symptoms-symptoms present ×1 week.  Patient reports that she has some nasal congestion, productive cough.  Postnasal drainage is present.  Mild low-grade temp.  And sinus pressure.  Moderate in intensity.  Patient denies any shortness of breath or difficulty breathing.      The following portions of the patient's history were reviewed and updated as appropriate:  Allergies, current medications, past family history, past medical  "history, past social history, past surgical history and problem list.    Review of Systems   Constitutional: Negative for appetite change, fatigue and unexpected weight change.   HENT: Positive for congestion and sore throat. Negative for ear pain, nosebleeds, postnasal drip, rhinorrhea, trouble swallowing and voice change.    Eyes: Negative for pain and visual disturbance.   Respiratory: Positive for cough. Negative for shortness of breath and wheezing.    Cardiovascular: Negative for chest pain and palpitations.   Gastrointestinal: Negative for abdominal pain, blood in stool, constipation and diarrhea.   Endocrine: Negative for cold intolerance and polydipsia.   Genitourinary: Negative for difficulty urinating, flank pain and hematuria.   Musculoskeletal: Positive for arthralgias and back pain. Negative for gait problem, joint swelling and myalgias.   Skin: Negative for color change and rash.   Allergic/Immunologic: Negative.  Negative for environmental allergies and food allergies.   Neurological: Negative for dizziness, syncope, numbness and headaches.   Hematological: Negative.    Psychiatric/Behavioral: Negative for dysphoric mood, self-injury, sleep disturbance and suicidal ideas.   All other systems reviewed and are negative.      Objective     /80   Pulse 99   Temp 98 °F (36.7 °C) (Temporal)   Ht 165.1 cm (65\")   Wt 81.2 kg (179 lb)   SpO2 98%   BMI 29.79 kg/m²   Office Visit on 08/27/2018   Component Date Value Ref Range Status   • Color 08/27/2018 Yellow  Yellow, Straw, Dark Yellow, Aleta Final   • Clarity, UA 08/27/2018 Turbid* Clear Final   • Glucose, UA 08/27/2018 Negative  Negative, 1000 mg/dL (3+) mg/dL Final   • Bilirubin 08/27/2018 Negative  Negative Final   • Ketones, UA 08/27/2018 Negative  Negative Final   • Specific Gravity  08/27/2018 1.015  1.005 - 1.030 Final   • Blood, UA 08/27/2018 Negative  Negative Final   • pH, Urine 08/27/2018 6.5  5.0 - 8.0 Final   • Protein, POC " 08/27/2018 Negative  Negative mg/dL Final   • Urobilinogen, UA 08/27/2018 Normal  Normal Final   • Leukocytes 08/27/2018 Negative  Negative Final   • Nitrite, UA 08/27/2018 Negative  Negative Final   • HCG, Urine, QL 08/27/2018 Negative  Negative Final   • Lot Number 08/27/2018 [id number]   Final   • Internal Positive Control 08/27/2018 Positive   Final   • Internal Negative Control 08/27/2018 Negative   Final       Physical Exam   Constitutional: She is oriented to person, place, and time. Vital signs are normal. She appears well-developed and well-nourished. No distress.   Pleasant and appropriate 48-year-old female.   HENT:   Head: Normocephalic.   Right Ear: Hearing, external ear and ear canal normal. Tympanic membrane is bulging.   Left Ear: Hearing, external ear and ear canal normal. Tympanic membrane is bulging.   Nose: Right sinus exhibits maxillary sinus tenderness. Right sinus exhibits no frontal sinus tenderness. Left sinus exhibits maxillary sinus tenderness. Left sinus exhibits no frontal sinus tenderness.   Mouth/Throat: Oropharynx is clear and moist. No oropharyngeal exudate.   Eyes: Conjunctivae are normal. Pupils are equal, round, and reactive to light. Right eye exhibits no discharge. Left eye exhibits no discharge.   Neck: Normal range of motion. Neck supple. No tracheal deviation present. No thyromegaly present.   Cardiovascular: Normal rate, regular rhythm and normal heart sounds. Exam reveals no gallop and no friction rub.   No murmur heard.  Pulmonary/Chest: Effort normal and breath sounds normal. No respiratory distress. She has no wheezes. She has no rales. She exhibits no tenderness.   Abdominal: Soft. Bowel sounds are normal. She exhibits no distension and no mass. There is no tenderness. There is no rebound and no guarding.   Musculoskeletal:        Lumbar back: She exhibits decreased range of motion, tenderness, pain and spasm.   Neurological: She is alert and oriented to person, place,  and time. She is not disoriented. No cranial nerve deficit or sensory deficit. She exhibits abnormal muscle tone.   Reflex Scores:       Patellar reflexes are 1+ on the right side and 2+ on the left side.  CN 2-12 grossly intact    Skin: Skin is warm and dry. Capillary refill takes less than 2 seconds. No rash noted. She is not diaphoretic. No erythema.   Psychiatric: She has a normal mood and affect. Her speech is normal and behavior is normal. Judgment and thought content normal. Cognition and memory are normal.   Vitals reviewed.      Assessment/Plan     Problem List Items Addressed This Visit        Nervous and Auditory    Spinal stenosis, lumbar region, with neurogenic claudication    Overview     Pre-surgical planning w/u         Relevant Orders    Urine Drug Screen - Urine, Clean Catch       Musculoskeletal and Integument    DDD (degenerative disc disease), lumbar    Relevant Medications    HYDROcodone-acetaminophen (NORCO)  MG per tablet       Other    Encounter for contraceptive management - Primary    Relevant Orders    POCT pregnancy, urine (Completed)      Other Visit Diagnoses     Acute URI        Relevant Medications    cefTRIAXone (ROCEPHIN) injection 1 g    cephalexin (KEFLEX) 500 MG capsule      Rocephin injection today, start Keflex and Robitussin.  Fluids, rest, symptomatic treatment advised. Steam therapy. Dispose of tooth bush after 24 hours of starting antibiotics if ordered. Complete antibiotics as ordered.  Discussed possible side effects/interactions of medication. Discussed symptoms to report as well as reasons to seek urgent or emergent medical attention. Understanding stated.   Recommend follow up in 2-3 days if not improved, sooner if needed.     Discussed birth control options. Risks vs benefits explained in detail. Pt states understanding and voices desire to begin specific birth control choice.   Urine pregnancy is negative.  Proper body mechanics has been reviewed and discussed  today.      As part of this patient's treatment plan they are being prescribed controlled substance/substances with potential for abuse. This patient has been made aware of the appropriate use of such medications, including potential risk of somnolence, limited ability to drive and / or work safely, and potential for overdose. It has also been made clear that these medications are for use by this patient only, without concomitant use of alcohol or other substances unless prescribed/advised by medical provider. Patient has completed controlled substance agreement detailing terms of continued prescribing of controlled substances including monitoring Marques reports, drug screens and pill counts. The patient was asked and states they are not receiving narcotic medication from any there provider or any provider that this office has not been made aware of. History and physical exam exhibit continued safe and appropriate use of controlled substances.   Goal: Improved quality of life and reduction in pain as evidenced by pt report.   Marques has been reviewed as consistent.  UDS is on file.     Patient has been instructed and counseled regarding opioid misuse and risk of addiction.  We have discussed proper storage and disposal of controlled medication.  Neurontin and Norco prescriptions have been electronically prescribed.  B-12 injection tolerated well today.       Patient's Body mass index is 29.79 kg/m². BMI is above normal parameters. Recommendations include: exercise counseling and nutrition counseling.    I advised Mirian of the risks of continuing to use tobacco, and I provided her with tobacco cessation educational materials in the After Visit Summary.     During this visit, I spent 3 minutes counseling the patient regarding tobacco cessation.  I did give patient an order to have x-rays of the lumbar spine and cervical spine as well as hips at the [facility].  Patient states that she's not had time as  she does not have someone to watch her head.  I recommend patient go to date and have these x-rays performed while she has a .    I have discussed diagnosis in detail today allowing time for questions and answers. Patient is aware of reasons to seek urgent or emergent medical care as well as reasons to return to the clinic for evaluation. Possible side effects, interactions and progression of symptoms discussed as well. Patient / family states understanding.   Emotional support and active listening provided.     Follow up in one month, sooner if needed. Routine labs every 3-6 months.     Dictated utilizing Dragon dictation        This document has been electronically signed by:  CHARMAINE Doran, NP-C

## 2018-11-30 ENCOUNTER — TELEPHONE (OUTPATIENT)
Dept: FAMILY MEDICINE CLINIC | Facility: CLINIC | Age: 48
End: 2018-11-30

## 2018-12-03 ENCOUNTER — TELEPHONE (OUTPATIENT)
Dept: FAMILY MEDICINE CLINIC | Facility: CLINIC | Age: 48
End: 2018-12-03

## 2018-12-03 DIAGNOSIS — M54.9 BACK PAIN, UNSPECIFIED BACK LOCATION, UNSPECIFIED BACK PAIN LATERALITY, UNSPECIFIED CHRONICITY: Primary | ICD-10-CM

## 2018-12-26 ENCOUNTER — OFFICE VISIT (OUTPATIENT)
Dept: FAMILY MEDICINE CLINIC | Facility: CLINIC | Age: 48
End: 2018-12-26

## 2018-12-26 VITALS
OXYGEN SATURATION: 98 % | WEIGHT: 173 LBS | SYSTOLIC BLOOD PRESSURE: 140 MMHG | DIASTOLIC BLOOD PRESSURE: 80 MMHG | HEIGHT: 65 IN | BODY MASS INDEX: 28.82 KG/M2 | TEMPERATURE: 98.8 F | HEART RATE: 102 BPM

## 2018-12-26 DIAGNOSIS — R00.2 PALPITATION: Primary | ICD-10-CM

## 2018-12-26 DIAGNOSIS — J30.1 CHRONIC SEASONAL ALLERGIC RHINITIS DUE TO POLLEN: ICD-10-CM

## 2018-12-26 DIAGNOSIS — E53.8 VITAMIN B 12 DEFICIENCY: ICD-10-CM

## 2018-12-26 DIAGNOSIS — M51.36 DDD (DEGENERATIVE DISC DISEASE), LUMBAR: ICD-10-CM

## 2018-12-26 DIAGNOSIS — K21.9 GASTROESOPHAGEAL REFLUX DISEASE WITHOUT ESOPHAGITIS: ICD-10-CM

## 2018-12-26 PROCEDURE — 96372 THER/PROPH/DIAG INJ SC/IM: CPT | Performed by: NURSE PRACTITIONER

## 2018-12-26 PROCEDURE — 99214 OFFICE O/P EST MOD 30 MIN: CPT | Performed by: NURSE PRACTITIONER

## 2018-12-26 RX ORDER — ESOMEPRAZOLE MAGNESIUM 40 MG/1
40 CAPSULE, DELAYED RELEASE ORAL
Qty: 30 CAPSULE | Refills: 5 | Status: SHIPPED | OUTPATIENT
Start: 2018-12-26 | End: 2019-06-19 | Stop reason: SDUPTHER

## 2018-12-26 RX ORDER — NIZATIDINE 150 MG/1
150 CAPSULE ORAL 2 TIMES DAILY
Qty: 60 CAPSULE | Refills: 5 | Status: SHIPPED | OUTPATIENT
Start: 2018-12-26 | End: 2019-06-19 | Stop reason: SDUPTHER

## 2018-12-26 RX ORDER — NEOMYCIN/POLYMYXIN B/PRAMOXINE 3.5-10K-1
2 CREAM (GRAM) TOPICAL DAILY
Qty: 100 TABLET | Refills: 5 | Status: SHIPPED | OUTPATIENT
Start: 2018-12-26 | End: 2019-06-19 | Stop reason: SDUPTHER

## 2018-12-26 RX ORDER — METHOCARBAMOL 750 MG/1
750 TABLET, FILM COATED ORAL 2 TIMES DAILY
Qty: 60 TABLET | Refills: 5 | Status: SHIPPED | OUTPATIENT
Start: 2018-12-26 | End: 2019-06-19 | Stop reason: SDUPTHER

## 2018-12-26 RX ORDER — SIMVASTATIN 20 MG
20 TABLET ORAL NIGHTLY
Qty: 30 TABLET | Refills: 5 | Status: CANCELLED | OUTPATIENT
Start: 2018-12-26

## 2018-12-26 RX ORDER — POTASSIUM CHLORIDE 750 MG/1
10 TABLET, EXTENDED RELEASE ORAL 2 TIMES DAILY
Qty: 60 TABLET | Refills: 5 | Status: SHIPPED | OUTPATIENT
Start: 2018-12-26 | End: 2019-06-19 | Stop reason: SDUPTHER

## 2018-12-26 RX ORDER — LISINOPRIL 10 MG/1
10 TABLET ORAL DAILY
Qty: 30 TABLET | Refills: 5 | Status: SHIPPED | OUTPATIENT
Start: 2018-12-26 | End: 2019-06-19 | Stop reason: SDUPTHER

## 2018-12-26 RX ORDER — LEVOCETIRIZINE DIHYDROCHLORIDE 5 MG/1
5 TABLET, FILM COATED ORAL EVERY EVENING
Qty: 30 TABLET | Refills: 5 | Status: CANCELLED | OUTPATIENT
Start: 2018-12-26

## 2018-12-26 RX ORDER — LEVOCETIRIZINE DIHYDROCHLORIDE 5 MG/1
5 TABLET, FILM COATED ORAL EVERY EVENING
Qty: 30 TABLET | Refills: 5 | Status: SHIPPED | OUTPATIENT
Start: 2018-12-26 | End: 2019-01-08 | Stop reason: ALTCHOICE

## 2018-12-26 RX ORDER — ERGOCALCIFEROL 1.25 MG/1
50000 CAPSULE ORAL WEEKLY
Qty: 4 CAPSULE | Refills: 5 | Status: SHIPPED | OUTPATIENT
Start: 2018-12-26 | End: 2019-04-24

## 2018-12-26 RX ORDER — SIMVASTATIN 20 MG
20 TABLET ORAL NIGHTLY
Qty: 30 TABLET | Refills: 5 | Status: SHIPPED | OUTPATIENT
Start: 2018-12-26 | End: 2019-06-19 | Stop reason: SDUPTHER

## 2018-12-26 RX ORDER — DICLOFENAC SODIUM 75 MG/1
75 TABLET, DELAYED RELEASE ORAL 2 TIMES DAILY
Qty: 60 TABLET | Refills: 5 | Status: SHIPPED | OUTPATIENT
Start: 2018-12-26 | End: 2019-06-19 | Stop reason: SDUPTHER

## 2018-12-26 RX ORDER — DICLOFENAC SODIUM 75 MG/1
TABLET, DELAYED RELEASE ORAL
Qty: 60 TABLET | Refills: 5 | Status: CANCELLED | OUTPATIENT
Start: 2018-12-26

## 2018-12-26 RX ORDER — FLUTICASONE PROPIONATE 50 MCG
1 SPRAY, SUSPENSION (ML) NASAL DAILY
Qty: 16 G | Refills: 5 | Status: SHIPPED | OUTPATIENT
Start: 2018-12-26 | End: 2019-06-19 | Stop reason: SDUPTHER

## 2018-12-26 RX ORDER — NIZATIDINE 150 MG/1
CAPSULE ORAL
Qty: 60 CAPSULE | Refills: 5 | Status: CANCELLED | OUTPATIENT
Start: 2018-12-26

## 2018-12-26 RX ORDER — ATENOLOL 25 MG/1
25 TABLET ORAL EVERY 12 HOURS SCHEDULED
Qty: 60 TABLET | Refills: 5 | Status: SHIPPED | OUTPATIENT
Start: 2018-12-26 | End: 2019-06-19 | Stop reason: SDUPTHER

## 2018-12-26 RX ORDER — HYDROCODONE BITARTRATE AND ACETAMINOPHEN 10; 325 MG/1; MG/1
1 TABLET ORAL EVERY 6 HOURS PRN
Qty: 90 TABLET | Refills: 0 | Status: SHIPPED | OUTPATIENT
Start: 2018-12-26 | End: 2019-01-23 | Stop reason: SDUPTHER

## 2018-12-26 RX ORDER — DULOXETIN HYDROCHLORIDE 60 MG/1
60 CAPSULE, DELAYED RELEASE ORAL DAILY
Qty: 30 CAPSULE | Refills: 3 | Status: SHIPPED | OUTPATIENT
Start: 2018-12-26 | End: 2019-06-19 | Stop reason: SDUPTHER

## 2018-12-26 RX ADMIN — CYANOCOBALAMIN 1000 MCG: 1000 INJECTION, SOLUTION INTRAMUSCULAR; SUBCUTANEOUS at 17:05

## 2018-12-26 NOTE — PROGRESS NOTES
Subjective   Mirian Fischer is a 48 y.o. female.     Chief Complaint   Patient presents with   • Diabetes   • Osteoarthritis       History of Present Illness:    Patient reports that since she decreased her atenolol down to once a day she's had some palpitations.  The pressure has been elevated at times per home monitoring.  Patient states that she has started taking one of her sons aspirin daily.  She denies chest pain.      Constipation-stable with current medication regimen.      Lumbar bulging/degenerative disc disease with herniated nucleus pulposis of lumbar-patient has been under the care of neurology.  In the past she has had some spinal injections which were mildly helpful.  Patient has chronic pain which she rates 6 on pain scale rating of 0-10.  Patient has no history of substance abuse or addiction.  She does currently receiving Neurontin and Norco.  Neurontin is helpful with her radiculopathy.  She reports that Norco is helpful to decrease her symptoms and improve her quality of life.  Patient describes her pain as aching, throbbing and sharp with radiation for her low back into both lower extremities.  At times her lower extremities are numb.  It has been recommended that she have spinal surgery which patient declines as she does not have anyone to care for her handicapped child.     Substance abuse risk is moderate as she takes 11 controlled medication.     B-12 deficiency-patient is requesting a B12 injection today.  She takes monthly      chronic GERD-patient reports that she has frequent exacerbation of symptoms.  She has had a recent colonoscopy and EGD prior to weight loss surgery.  She has not had a GI consult recently.  Other than bariatrics.          The following portions of the patient's history were reviewed and updated as appropriate:  Allergies, current medications, past family history, past medical history, past social history, past surgical history and problem list.    Review of  "Systems   Constitutional: Negative for appetite change, fatigue and unexpected weight change.   HENT: Negative for congestion, ear pain, nosebleeds, postnasal drip, rhinorrhea, sore throat, trouble swallowing and voice change.    Eyes: Negative for pain and visual disturbance.   Respiratory: Negative for cough, chest tightness, shortness of breath and wheezing.    Cardiovascular: Positive for palpitations. Negative for chest pain and leg swelling.   Gastrointestinal: Positive for constipation. Negative for abdominal pain, blood in stool and diarrhea.   Endocrine: Negative for cold intolerance and polydipsia.   Genitourinary: Negative for difficulty urinating, dysuria, flank pain and hematuria.   Musculoskeletal: Positive for arthralgias and back pain. Negative for gait problem, joint swelling and myalgias.   Skin: Negative for color change and rash.   Allergic/Immunologic: Negative.    Neurological: Negative for syncope, numbness and headaches.   Hematological: Negative.    Psychiatric/Behavioral: Negative for dysphoric mood, self-injury, sleep disturbance and suicidal ideas.   All other systems reviewed and are negative.      Objective     /80   Pulse 102   Temp 98.8 °F (37.1 °C) (Tympanic)   Ht 165.1 cm (65\")   Wt 78.5 kg (173 lb)   SpO2 98%   BMI 28.79 kg/m²       Physical Exam   Constitutional: She is oriented to person, place, and time. Vital signs are normal. She appears well-developed and well-nourished. No distress.   Pleasant and appropriate 48-year-old female.   HENT:   Head: Normocephalic.   Right Ear: Hearing, tympanic membrane, external ear and ear canal normal.   Left Ear: Hearing, tympanic membrane, external ear and ear canal normal.   Nose: Nose normal. Right sinus exhibits no maxillary sinus tenderness and no frontal sinus tenderness. Left sinus exhibits no maxillary sinus tenderness and no frontal sinus tenderness.   Mouth/Throat: Oropharynx is clear and moist. No oropharyngeal exudate. "   Eyes: Conjunctivae are normal. Pupils are equal, round, and reactive to light. Right eye exhibits no discharge. Left eye exhibits no discharge.   Neck: Normal range of motion. Neck supple. No tracheal deviation present. No thyromegaly present.   Cardiovascular: Normal rate, regular rhythm, S1 normal, S2 normal, normal heart sounds and normal pulses. Exam reveals no gallop and no friction rub.   No murmur heard.  Pulmonary/Chest: Effort normal and breath sounds normal. No respiratory distress. She has no wheezes. She has no rales. She exhibits no tenderness.   Abdominal: Soft. Bowel sounds are normal. She exhibits no distension and no mass. There is no tenderness. There is no rebound and no guarding.   Musculoskeletal:        Lumbar back: She exhibits decreased range of motion, tenderness, pain and spasm.   Neurological: She is alert and oriented to person, place, and time. She is not disoriented. No cranial nerve deficit or sensory deficit. She exhibits abnormal muscle tone.   Reflex Scores:       Patellar reflexes are 1+ on the right side and 2+ on the left side.  CN 2-12 grossly intact    Skin: Skin is warm and dry. Capillary refill takes less than 2 seconds. No rash noted. She is not diaphoretic. No erythema.   Psychiatric: She has a normal mood and affect. Her behavior is normal. Judgment and thought content normal.   Vitals reviewed.      Assessment/Plan     Problem List Items Addressed This Visit        Digestive    Gastroesophageal reflux disease without esophagitis    Relevant Medications    esomeprazole (nexIUM) 40 MG capsule    nizatidine (AXID) 150 MG capsule    Other Relevant Orders    Ambulatory Referral to Gastroenterology    Vitamin B 12 deficiency    Overview     Monthly b-12 injections            Musculoskeletal and Integument    DDD (degenerative disc disease), lumbar    Relevant Medications    HYDROcodone-acetaminophen (NORCO)  MG per tablet      Other Visit Diagnoses     Palpitation    -   Primary    Relevant Orders    Ambulatory Referral to Cardiology    Chronic seasonal allergic rhinitis due to pollen        symptomatic treatment, steam therapy, fluids, stop smoking.     Relevant Medications    fluticasone (FLONASE) 50 MCG/ACT nasal spray          Proper body mechanics has been reviewed and discussed today.      As part of this patient's treatment plan they are being prescribed controlled substance/substances with potential for abuse. This patient has been made aware of the appropriate use of such medications, including potential risk of somnolence, limited ability to drive and / or work safely, and potential for overdose. It has also been made clear that these medications are for use by this patient only, without concomitant use of alcohol or other substances unless prescribed/advised by medical provider. Patient has completed controlled substance agreement detailing terms of continued prescribing of controlled substances including monitoring Marques reports, drug screens and pill counts. The patient was asked and states they are not receiving narcotic medication from any there provider or any provider that this office has not been made aware of. History and physical exam exhibit continued safe and appropriate use of controlled substances.   Goal: Improved quality of life and reduction in pain as evidenced by pt report.   Marques  has been reviewed as consistent.  UDS is on file.     Patient has been instructed and counseled regarding opioid misuse and risk of addiction.  We have discussed proper storage and disposal of controlled medication.    increase atenolol up to 25 mg twice daily.  Refer to cardiology.  Advised patient with any chest pain, palpitations, blood pressure elevations  or other changes/distress go straight to the emergency room or call 911 for emergency assistance.             Patient's Body mass index is 28.79 kg/m². BMI is above normal parameters. Recommendations include: exercise  counseling and nutrition counseling.    I advised Mirian of the risks of continuing to use tobacco, and I provided her with tobacco cessation educational materials in the After Visit Summary.     During this visit, I spent 3 minutes counseling the patient regarding tobacco cessation.      I have discussed diagnosis in detail today allowing time for questions and answers. Patient is aware of reasons to seek urgent or emergent medical care as well as reasons to return to the clinic for evaluation. Possible side effects, interactions and progression of symptoms discussed as well. Patient / family states understanding.   Emotional support and active listening provided.     Current Outpatient Medications:   •  atenolol (TENORMIN) 25 MG tablet, Take 1 tablet by mouth Every 12 (Twelve) Hours., Disp: 60 tablet, Rfl: 5  •  diclofenac (VOLTAREN) 75 MG EC tablet, Take 1 tablet by mouth 2 (Two) Times a Day., Disp: 60 tablet, Rfl: 5  •  DULoxetine (CYMBALTA) 60 MG capsule, Take 1 capsule by mouth Daily., Disp: 30 capsule, Rfl: 3  •  esomeprazole (nexIUM) 40 MG capsule, Take 1 capsule by mouth Every Morning Before Breakfast., Disp: 30 capsule, Rfl: 5  •  fluticasone (FLONASE) 50 MCG/ACT nasal spray, 1 spray by Each Nare route Daily., Disp: 16 g, Rfl: 5  •  gabapentin (NEURONTIN) 600 MG tablet, Take 1 tablet by mouth 3 (Three) Times a Day., Disp: 90 tablet, Rfl: 2  •  hydrochlorothiazide (HYDRODIURIL) 25 MG tablet, TAKE 1 TABLET BY MOUTH DAILY., Disp: 30 tablet, Rfl: 5  •  HYDROcodone-acetaminophen (NORCO)  MG per tablet, Take 1 tablet by mouth Every 6 (Six) Hours As Needed for Moderate Pain ., Disp: 90 tablet, Rfl: 0  •  Lactobacillus-Inulin ([facility]) capsule, Take 1 capsule by mouth 2 (Two) Times a Day., Disp: 60 capsule, Rfl: 5  •  levocetirizine (XYZAL) 5 MG tablet, Take 1 tablet by mouth Every Evening., Disp: 30 tablet, Rfl: 5  •  linaclotide (LINZESS) 290 MCG capsule capsule, Take 1 capsule by  mouth Every Morning Before Breakfast., Disp: 30 capsule, Rfl: 5  •  lisinopril (PRINIVIL,ZESTRIL) 10 MG tablet, Take 1 tablet by mouth Daily., Disp: 30 tablet, Rfl: 5  •  medroxyPROGESTERone (DEPO-PROVERA) 150 MG/ML injection, Inject 1 mL into the shoulder, thigh, or buttocks Every 3 (Three) Months., Disp: 1 mL, Rfl: 3  •  methocarbamol (ROBAXIN) 750 MG tablet, Take 1 tablet by mouth 2 (Two) Times a Day., Disp: 60 tablet, Rfl: 5  •  Multiple Vitamins-Minerals (MULTI-VITAMIN GUMMIES) chewable tablet, Chew 2 each Daily., Disp: 100 tablet, Rfl: 5  •  nizatidine (AXID) 150 MG capsule, Take 1 capsule by mouth 2 (Two) Times a Day., Disp: 60 capsule, Rfl: 5  •  ondansetron (ZOFRAN) 4 MG tablet, Take 4 mg by mouth Every 6 (Six) Hours As Needed for Nausea or Vomiting., Disp: , Rfl:   •  potassium chloride (K-DUR,KLOR-CON) 10 MEQ CR tablet, Take 1 tablet by mouth 2 (Two) Times a Day., Disp: 60 tablet, Rfl: 5  •  simvastatin (ZOCOR) 20 MG tablet, Take 1 tablet by mouth Every Night., Disp: 30 tablet, Rfl: 5  •  vitamin D (ERGOCALCIFEROL) 86611 units capsule capsule, Take 1 capsule by mouth 1 (One) Time Per Week., Disp: 4 capsule, Rfl: 5  •  aspirin 81 MG tablet, Take 1 tablet by mouth Daily., Disp: 90 tablet, Rfl: 1    Current Facility-Administered Medications:   •  cyanocobalamin injection 1,000 mcg, 1,000 mcg, Intramuscular, Q28 Days, Jaleesa Contreras APRN, 1,000 mcg at 12/26/18 1705    Dictated utilizing Dragon dictation    Follow up in one month, sooner if needed. Routine labs every 3-6 months.           This document has been electronically signed by:  CHARMAINE Doran, NP-C

## 2019-01-04 ENCOUNTER — HOSPITAL ENCOUNTER (OUTPATIENT)
Dept: MRI IMAGING | Facility: HOSPITAL | Age: 49
Discharge: HOME | End: 2019-01-04
Admitting: NURSE PRACTITIONER

## 2019-01-04 DIAGNOSIS — M54.9 BACK PAIN, UNSPECIFIED BACK LOCATION, UNSPECIFIED BACK PAIN LATERALITY, UNSPECIFIED CHRONICITY: ICD-10-CM

## 2019-01-04 PROCEDURE — 72148 MRI LUMBAR SPINE W/O DYE: CPT | Performed by: RADIOLOGY

## 2019-01-04 PROCEDURE — 72148 MRI LUMBAR SPINE W/O DYE: CPT

## 2019-01-07 DIAGNOSIS — J32.0 MAXILLARY SINUSITIS, UNSPECIFIED CHRONICITY: ICD-10-CM

## 2019-01-08 RX ORDER — LORATADINE, PSEUDOEPHEDRINE SULFATE 5; 120 MG/1; MG/1
TABLET, FILM COATED, EXTENDED RELEASE ORAL
Qty: 10 TABLET | Refills: 1 | Status: SHIPPED | OUTPATIENT
Start: 2019-01-08 | End: 2019-01-23 | Stop reason: SDUPTHER

## 2019-01-23 ENCOUNTER — OFFICE VISIT (OUTPATIENT)
Dept: FAMILY MEDICINE CLINIC | Facility: CLINIC | Age: 49
End: 2019-01-23

## 2019-01-23 VITALS
BODY MASS INDEX: 28.99 KG/M2 | HEIGHT: 65 IN | SYSTOLIC BLOOD PRESSURE: 118 MMHG | HEART RATE: 86 BPM | OXYGEN SATURATION: 96 % | DIASTOLIC BLOOD PRESSURE: 70 MMHG | TEMPERATURE: 98.3 F | WEIGHT: 174 LBS

## 2019-01-23 DIAGNOSIS — M51.36 DDD (DEGENERATIVE DISC DISEASE), LUMBAR: ICD-10-CM

## 2019-01-23 DIAGNOSIS — E53.8 VITAMIN B 12 DEFICIENCY: ICD-10-CM

## 2019-01-23 DIAGNOSIS — Z72.0 TOBACCO USE: ICD-10-CM

## 2019-01-23 DIAGNOSIS — M51.36 BULGE OF LUMBAR DISC WITHOUT MYELOPATHY: Primary | ICD-10-CM

## 2019-01-23 DIAGNOSIS — J32.0 MAXILLARY SINUSITIS, UNSPECIFIED CHRONICITY: ICD-10-CM

## 2019-01-23 PROCEDURE — 96372 THER/PROPH/DIAG INJ SC/IM: CPT | Performed by: NURSE PRACTITIONER

## 2019-01-23 PROCEDURE — 99407 BEHAV CHNG SMOKING > 10 MIN: CPT | Performed by: NURSE PRACTITIONER

## 2019-01-23 PROCEDURE — 99214 OFFICE O/P EST MOD 30 MIN: CPT | Performed by: NURSE PRACTITIONER

## 2019-01-23 RX ORDER — GUAIFENESIN/DEXTROMETHORPHAN 100-10MG/5
5 SYRUP ORAL 3 TIMES DAILY PRN
Qty: 236 ML | Refills: 0 | Status: SHIPPED | OUTPATIENT
Start: 2019-01-23 | End: 2019-04-24

## 2019-01-23 RX ORDER — AZITHROMYCIN 250 MG/1
TABLET, FILM COATED ORAL
Qty: 6 TABLET | Refills: 0 | Status: SHIPPED | OUTPATIENT
Start: 2019-01-23 | End: 2019-01-28

## 2019-01-23 RX ORDER — GABAPENTIN 600 MG/1
600 TABLET ORAL 3 TIMES DAILY
Qty: 90 TABLET | Refills: 2 | Status: SHIPPED | OUTPATIENT
Start: 2019-01-23 | End: 2019-04-24 | Stop reason: SDUPTHER

## 2019-01-23 RX ORDER — MEDROXYPROGESTERONE ACETATE 150 MG/ML
INJECTION, SUSPENSION INTRAMUSCULAR
Qty: 1 ML | Refills: 3 | Status: SHIPPED | OUTPATIENT
Start: 2019-01-23 | End: 2019-06-19 | Stop reason: SDUPTHER

## 2019-01-23 RX ORDER — HYDROCODONE BITARTRATE AND ACETAMINOPHEN 10; 325 MG/1; MG/1
1 TABLET ORAL EVERY 6 HOURS PRN
Qty: 90 TABLET | Refills: 0 | Status: SHIPPED | OUTPATIENT
Start: 2019-01-23 | End: 2019-02-28 | Stop reason: SDUPTHER

## 2019-01-23 RX ADMIN — CYANOCOBALAMIN 1000 MCG: 1000 INJECTION, SOLUTION INTRAMUSCULAR; SUBCUTANEOUS at 12:30

## 2019-01-23 NOTE — PROGRESS NOTES
Subjective   Mirian Fischer is a 48 y.o. female.     Chief Complaint   Patient presents with   • Diabetes     go over mri   • Hypertension   • Osteoarthritis       History of Present Illness:    B-12 deficiency-patient is requesting a B12 injection today.  She takes monthly supplementation.       Lumbar bulging/degenerative disc disease with herniated nucleus pulposis of lumbar-patient has been under the care of neurology.  In the past she has had some spinal injections which were mildly helpful.  Patient has chronic pain which she rates 8 on pain scale rating of 0-10.  Patient has no history of substance abuse or addiction.  She does currently receiving Neurontin and Norco.  Neurontin is helpful with her radiculopathy.  She reports that Norco is helpful to decrease her symptoms and improve her quality of life.  Patient describes her pain as aching, throbbing and sharp with radiation for her low back into both lower extremities.  At times her lower extremities are numb.  It has been recommended that she have spinal surgery which patient declines as she does not have anyone to care for her handicapped child.  Patient has had a new MRI since her last visit to review today.      Hypertension-stable    Type 2 diabetes-stable    Oral contraceptive management-Depo-Provera injection is due next month.     Acute sinus pain, sinus pressure and sore throat with ear pressure and mild cough.  Symptoms are moderate.  Present ×1 week.  Patient is requesting a refill on Alavert as this helps with her sinus pressure.    Smoker-patient smoked most of her adult life.  She states that she does want to quit smoking but she has not been able to.    The following portions of the patient's history were reviewed and updated as appropriate:  Allergies, current medications, past family history, past medical history, past social history, past surgical history and problem list.    Review of Systems   Constitutional: Negative for appetite  "change, fatigue and unexpected weight change.   HENT: Positive for ear pain, sinus pressure, sinus pain and sore throat. Negative for congestion, nosebleeds, postnasal drip, rhinorrhea, trouble swallowing and voice change.    Eyes: Negative for pain and visual disturbance.   Respiratory: Positive for cough. Negative for shortness of breath and wheezing.    Cardiovascular: Negative for chest pain and palpitations.   Gastrointestinal: Positive for constipation (improved with diet and med regimen ). Negative for abdominal pain, blood in stool and diarrhea.   Endocrine: Negative for cold intolerance and polydipsia.   Genitourinary: Negative for difficulty urinating, dysuria, flank pain and hematuria.        Hesitancy at times   Musculoskeletal: Positive for arthralgias and back pain. Negative for gait problem, joint swelling and myalgias.   Skin: Negative for color change and rash.   Allergic/Immunologic: Negative.    Neurological: Positive for weakness and numbness (legs). Negative for syncope and headaches.   Hematological: Negative.    Psychiatric/Behavioral: Negative for dysphoric mood, self-injury, sleep disturbance and suicidal ideas.   All other systems reviewed and are negative.      Objective     /70   Pulse 86 Comment: Apical  Temp 98.3 °F (36.8 °C) (Tympanic)   Ht 165.1 cm (65\")   Wt 78.9 kg (174 lb)   SpO2 96%   BMI 28.96 kg/m²       Physical Exam   Constitutional: She is oriented to person, place, and time. Vital signs are normal. She appears well-developed and well-nourished. No distress.   Pleasant and appropriate 48-year-old female.  She does appear to be in acute pain today.  Does appear to be acutely ill.   HENT:   Head: Normocephalic.   Right Ear: Hearing, external ear and ear canal normal. Tympanic membrane is bulging.   Left Ear: Hearing, external ear and ear canal normal. Tympanic membrane is bulging.   Nose: Right sinus exhibits maxillary sinus tenderness. Right sinus exhibits no " frontal sinus tenderness. Left sinus exhibits maxillary sinus tenderness. Left sinus exhibits no frontal sinus tenderness.   Mouth/Throat: Oropharynx is clear and moist. No oropharyngeal exudate.   Eyes: Conjunctivae are normal. Pupils are equal, round, and reactive to light. Right eye exhibits no discharge. Left eye exhibits no discharge.   Neck: Normal range of motion. Neck supple. No tracheal deviation present. No thyromegaly present.   Cardiovascular: Normal rate, regular rhythm and normal heart sounds. Exam reveals no gallop and no friction rub.   No murmur heard.  Pulmonary/Chest: Effort normal and breath sounds normal. No respiratory distress. She has no wheezes. She has no rales. She exhibits no tenderness.   Abdominal: Soft. Bowel sounds are normal. She exhibits no distension and no mass. There is no tenderness. There is no rebound and no guarding.   Musculoskeletal:        Lumbar back: She exhibits decreased range of motion, tenderness, pain and spasm.        Right foot: There is no swelling.        Left foot: There is no swelling.   Neurological: She is alert and oriented to person, place, and time. She is not disoriented. A sensory deficit (Decreased soft and sharp sensation bilateral lower extremities) is present. No cranial nerve deficit. She exhibits abnormal muscle tone.   Reflex Scores:       Patellar reflexes are 1+ on the right side and 2+ on the left side.  Skin: Skin is warm and dry. Capillary refill takes less than 2 seconds. No rash noted. She is not diaphoretic. No erythema.   Psychiatric: She has a normal mood and affect. Her speech is normal and behavior is normal. Judgment and thought content normal. Cognition and memory are normal.   Vitals reviewed.      Assessment/Plan     Problem List Items Addressed This Visit        Digestive    Vitamin B 12 deficiency    Overview     Monthly b-12 injections            Musculoskeletal and Integument    DDD (degenerative disc disease), lumbar     Overview     1/4/2019 MRI Lumbar Spine findings  Severe multilevel degenerative disc disease in combination with facet  arthropathy and congenital shortening of the pedicles resulting in  generalized central canal stenosis detailed above.  2. Superimposed disc herniations at L1/2 and L5/S1 contributing to  central canal stenosis and producing mass effect on the central nerve  roots.  3. Severe right neural foraminal stenosis L5/S1 with mass effect on  exiting L5 nerve root laterally.  4. Degenerative type spondylolisthesis anteriorly 8 mm of L4 on L5.  5. Hypertrophic facet arthropathy with periarticular inflammation of  L3/4 and L4/5.            Relevant Medications    HYDROcodone-acetaminophen (NORCO)  MG per tablet    Bulge of lumbar disc without myelopathy - Primary    Overview     Degenerative disc changes in the lumbar disc spaces. There  is a grade 1 spondylolisthesis at L4-5 felt to be secondary to facet  joint arthritis. There is  narrowing of the lateral recesses at L3-4 and  L4-5. The spinal stenosis is most severe at L4-5. There is a fairly  large disc herniation at L5-S1.      January 2019 MRI results   IMPRESSION:     1. Severe multilevel degenerative disc disease in combination with facet  arthropathy and congenital shortening of the pedicles resulting in  generalized central canal stenosis detailed above.  2. Superimposed disc herniations at L1/2 and L5/S1 contributing to  central canal stenosis and producing mass effect on the central nerve  roots.  3. Severe right neural foraminal stenosis L5/S1 with mass effect on  exiting L5 nerve root laterally.  4. Degenerative type spondylolisthesis anteriorly 8 mm of L4 on L5.  5. Hypertrophic facet arthropathy with periarticular inflammation of  L3/4 and L4/5.                 Other    Tobacco use      Other Visit Diagnoses     Maxillary sinusitis, unspecified chronicity        Relevant Medications    loratadine-pseudoephedrine (ALAVERT ALLERGY/SINUS)  5-120 MG per 12 hr tablet            b 12 injection today.  depoprovera next month.  Reviewed and discussed MRI results showing :   IMPRESSION:     1. Severe multilevel degenerative disc disease in combination with facet  arthropathy and congenital shortening of the pedicles resulting in  generalized central canal stenosis detailed above.  2. Superimposed disc herniations at L1/2 and L5/S1 contributing to  central canal stenosis and producing mass effect on the central nerve  roots.  3. Severe right neural foraminal stenosis L5/S1 with mass effect on  exiting L5 nerve root laterally.  4. Degenerative type spondylolisthesis anteriorly 8 mm of L4 on L5.  5. Hypertrophic facet arthropathy with periarticular inflammation of  L3/4 and L4/5.     Asked the staff to call Dr. Teran's office and see if he would like to see the patient earlier given her current MRI results.  Mirian is very worried about her back as she can tell that it is progressing.    Remain on Neurontin and Norco at this time.  Proper body mechanics has been reviewed and discussed today.      As part of this patient's treatment plan they are being prescribed controlled substance/substances with potential for abuse. This patient has been made aware of the appropriate use of such medications, including potential risk of somnolence, limited ability to drive and / or work safely, and potential for overdose. It has also been made clear that these medications are for use by this patient only, without concomitant use of alcohol or other substances unless prescribed/advised by medical provider. Patient has completed controlled substance agreement detailing terms of continued prescribing of controlled substances including monitoring Marques reports, drug screens and pill counts. The patient was asked and states they are not receiving narcotic medication from any there provider or any provider that this office has not been made aware of. History and physical exam  exhibit continued safe and appropriate use of controlled substances.   Goal: Improved quality of life and reduction in pain as evidenced by pt report.   Marques  has been reviewed as consistent.  UDS is on file.     Moderate risk for substance abuse or addiction as she has chronic pain and receives more than 1 controlled medication.  Her medication regimen is appropriate and we will continue to monitor her closely for compliance and the need to continue controlled medication.    Neurontin 600 mg 1 by mouth 3 times a day #90 with 2 refills.  Norco 10-3 25 mg 1 by mouth every 6 hours when necessary #90 with no refill      Patient has been instructed and counseled regarding opioid misuse and risk of addiction.  We have discussed proper storage and disposal of controlled medication.         Patient's Body mass index is 28.96 kg/m². BMI is above normal parameters. Recommendations include: exercise counseling and nutrition counseling.    I advised Mirian of the risks of continuing to use tobacco, and I provided her with tobacco cessation educational materials in the After Visit Summary.     During this visit, I spent 11 minutes counseling the patient regarding tobacco cessation.    Smoke cessation education provided.  We have established a quit date. Reviewed the adverse effects of smoking. Discussed community programs as well as medical options to assist with cessation. Total time today spent on smoke cessation education 11 minutes. We have discussed the risk versus benefits of nicotine patches, Wellbutrin and Chantix.  We have discussed methods to distract from the habit of having a cigarette in hand such as carrying a strong chewing gum.  Individualized plan of care has been developed.     I have discussed diagnosis in detail today allowing time for questions and answers. Patient is aware of reasons to seek urgent or emergent medical care as well as reasons to return to the clinic for evaluation. Possible side effects,  interactions and progression of symptoms discussed as well. Patient / family states understanding.   Emotional support and active listening provided.         This document has been electronically signed by:  CHARMAINE Doran, NP-C

## 2019-01-24 RX ORDER — DULOXETIN HYDROCHLORIDE 60 MG/1
CAPSULE, DELAYED RELEASE ORAL
Qty: 30 CAPSULE | Refills: 3 | Status: SHIPPED | OUTPATIENT
Start: 2019-01-24 | End: 2019-02-28

## 2019-01-24 RX ORDER — POTASSIUM CHLORIDE 750 MG/1
TABLET, FILM COATED, EXTENDED RELEASE ORAL
Qty: 60 TABLET | Refills: 2 | Status: SHIPPED | OUTPATIENT
Start: 2019-01-24 | End: 2019-06-19 | Stop reason: SDUPTHER

## 2019-01-24 RX ORDER — ERGOCALCIFEROL 1.25 MG/1
CAPSULE ORAL
Qty: 4 CAPSULE | Refills: 5 | Status: SHIPPED | OUTPATIENT
Start: 2019-01-24 | End: 2019-06-19 | Stop reason: SDUPTHER

## 2019-01-28 ENCOUNTER — OFFICE VISIT (OUTPATIENT)
Dept: CARDIOLOGY | Facility: CLINIC | Age: 49
End: 2019-01-28

## 2019-01-28 ENCOUNTER — TELEPHONE (OUTPATIENT)
Dept: CARDIOLOGY | Facility: CLINIC | Age: 49
End: 2019-01-28

## 2019-01-28 VITALS
RESPIRATION RATE: 16 BRPM | WEIGHT: 175.4 LBS | DIASTOLIC BLOOD PRESSURE: 65 MMHG | BODY MASS INDEX: 29.22 KG/M2 | HEART RATE: 70 BPM | HEIGHT: 65 IN | SYSTOLIC BLOOD PRESSURE: 105 MMHG

## 2019-01-28 DIAGNOSIS — R07.2 PRECORDIAL PAIN: Primary | ICD-10-CM

## 2019-01-28 DIAGNOSIS — I10 ESSENTIAL HYPERTENSION: ICD-10-CM

## 2019-01-28 DIAGNOSIS — Z72.0 TOBACCO ABUSE: ICD-10-CM

## 2019-01-28 DIAGNOSIS — K21.9 GASTROESOPHAGEAL REFLUX DISEASE WITHOUT ESOPHAGITIS: ICD-10-CM

## 2019-01-28 DIAGNOSIS — M48.062 SPINAL STENOSIS, LUMBAR REGION, WITH NEUROGENIC CLAUDICATION: ICD-10-CM

## 2019-01-28 PROCEDURE — 99406 BEHAV CHNG SMOKING 3-10 MIN: CPT | Performed by: INTERNAL MEDICINE

## 2019-01-28 PROCEDURE — 99204 OFFICE O/P NEW MOD 45 MIN: CPT | Performed by: INTERNAL MEDICINE

## 2019-01-28 PROCEDURE — 93000 ELECTROCARDIOGRAM COMPLETE: CPT | Performed by: INTERNAL MEDICINE

## 2019-01-28 RX ORDER — HYDROCHLOROTHIAZIDE 25 MG/1
25 TABLET ORAL DAILY
Qty: 30 TABLET | Refills: 5 | Status: SHIPPED | OUTPATIENT
Start: 2019-01-28 | End: 2019-06-19 | Stop reason: SDUPTHER

## 2019-01-28 RX ORDER — NICOTINE 21-14-7MG
1 KIT TRANSDERMAL DAILY
Qty: 56 EACH | Refills: 1 | Status: ON HOLD | OUTPATIENT
Start: 2019-01-28 | End: 2019-03-28

## 2019-01-28 NOTE — PROGRESS NOTES
Jaleesa Contreras, CHARMAINE  Mirian Fischer  1970 01/28/2019    Patient Active Problem List   Diagnosis   • Osteoarthritis, multiple sites   • HNP (herniated nucleus pulposus), lumbar   • Spinal stenosis, lumbar region, with neurogenic claudication   • Tobacco use   • Encounter for contraceptive management   • Diverticulitis of large intestine with perforation without bleeding   • DDD (degenerative disc disease), lumbar   • Bulge of lumbar disc without myelopathy   • Controlled type 2 DM with peripheral circulatory disorder (CMS/HCC)   • Essential hypertension   • Chronic left shoulder pain   • Cubital tunnel syndrome   • Anemia due to vitamin B12 deficiency   • Urinary and bowel incontinence   • Colovaginal fistula   • Hx of laparoscopic gastric banding   • Slow transit constipation   • Nicotine addiction   • Stress incontinence   • Gastroesophageal reflux disease without esophagitis   • Normal gynecologic examination   • Encounter for surveillance of injectable contraceptive   • High risk medication use   • Vitamin B 12 deficiency   • Encounter for vaccination   • Vitamin D deficiency   • Reactive depression   • Excessive cerumen in both ear canals       Dear Jaleesa Contreras, APRN:    Subjective     Mirian Fischer is a 48 y.o. female with the problems as listed above, presents    Chief complaint: Chest pains    History of Present Illness: Ms. Fischer is a pleasant 48-year-old  female with no history of known heart disease or coronary artery disease, has history of hypertension and smoking of about 2 packs a day and has been a smoker for 30 years and a positive family history, presents with complaint of having recurrent chest pains for about a couple months.  The first episode was about a couple months ago when she had fairly severe chest pain which is described as aching pain that was associated with nausea and some sweating.  This apparently resolved with an aspirin and extra  dose of her blood pressure medicine. She had another episode about a month and a half ago at which time her atenolol dose was increased by her PCP and then she has not had any more chest pain since then.  She denies any significant dyspnea, PND orthopnea.  She has some intermittent palpitations and rapid heartbeat associated with dizziness but no syncope.  She has chronic back pains from degenerative disc disease.  She had bariatric surgery in March of last year and reportedly lost about 75 pounds since.    Cardiac risk factors:hypertension, smoking and Positive family Hx. of premature athersclerotivc disease.    No Known Allergies:      Current Outpatient Medications:   •  atenolol (TENORMIN) 25 MG tablet, Take 1 tablet by mouth Every 12 (Twelve) Hours., Disp: 60 tablet, Rfl: 5  •  diclofenac (VOLTAREN) 75 MG EC tablet, Take 1 tablet by mouth 2 (Two) Times a Day., Disp: 60 tablet, Rfl: 5  •  DULoxetine (CYMBALTA) 60 MG capsule, Take 1 capsule by mouth Daily., Disp: 30 capsule, Rfl: 3  •  DULoxetine (CYMBALTA) 60 MG capsule, TAKE 1 CAPSULE BY MOUTH DAILY., Disp: 30 capsule, Rfl: 3  •  esomeprazole (nexIUM) 40 MG capsule, Take 1 capsule by mouth Every Morning Before Breakfast., Disp: 30 capsule, Rfl: 5  •  fluticasone (FLONASE) 50 MCG/ACT nasal spray, 1 spray by Each Nare route Daily., Disp: 16 g, Rfl: 5  •  gabapentin (NEURONTIN) 600 MG tablet, Take 1 tablet by mouth 3 (Three) Times a Day., Disp: 90 tablet, Rfl: 2  •  guaifenesin-dextromethorphan (ROBITUSSIN DM) 100-10 MG/5ML syrup, Take 5 mL by mouth 3 (Three) Times a Day As Needed for Congestion., Disp: 236 mL, Rfl: 0  •  hydrochlorothiazide (HYDRODIURIL) 25 MG tablet, TAKE 1 TABLET BY MOUTH DAILY., Disp: 30 tablet, Rfl: 5  •  HYDROcodone-acetaminophen (NORCO)  MG per tablet, Take 1 tablet by mouth Every 6 (Six) Hours As Needed for Moderate Pain ., Disp: 90 tablet, Rfl: 0  •  Lactobacillus-Inulin ([facility] DIGESTIVE HEALTH) capsule, Take 1 capsule by mouth  2 (Two) Times a Day., Disp: 60 capsule, Rfl: 5  •  linaclotide (LINZESS) 290 MCG capsule capsule, Take 1 capsule by mouth Every Morning Before Breakfast., Disp: 30 capsule, Rfl: 5  •  lisinopril (PRINIVIL,ZESTRIL) 10 MG tablet, Take 1 tablet by mouth Daily., Disp: 30 tablet, Rfl: 5  •  loratadine-pseudoephedrine (ALAVERT ALLERGY/SINUS) 5-120 MG per 12 hr tablet, Take 1 tablet by mouth 2 (Two) Times a Day., Disp: 10 tablet, Rfl: 1  •  MedroxyPROGESTERone Acetate 150 MG/ML suspension prefilled syringe, INJECT 1ML INTO THE SHOULDER,THIGH OR BUTTOCKS EVERY 3 MONTHS, Disp: 1 mL, Rfl: 3  •  methocarbamol (ROBAXIN) 750 MG tablet, Take 1 tablet by mouth 2 (Two) Times a Day., Disp: 60 tablet, Rfl: 5  •  Multiple Vitamins-Minerals (MULTI-VITAMIN GUMMIES) chewable tablet, Chew 2 each Daily., Disp: 100 tablet, Rfl: 5  •  nizatidine (AXID) 150 MG capsule, Take 1 capsule by mouth 2 (Two) Times a Day., Disp: 60 capsule, Rfl: 5  •  ondansetron (ZOFRAN) 4 MG tablet, Take 4 mg by mouth Every 6 (Six) Hours As Needed for Nausea or Vomiting., Disp: , Rfl:   •  potassium chloride (K-DUR) 10 MEQ CR tablet, TAKE 1 TABLET BY MOUTH TWICE DAILY, Disp: 60 tablet, Rfl: 2  •  potassium chloride (K-DUR,KLOR-CON) 10 MEQ CR tablet, Take 1 tablet by mouth 2 (Two) Times a Day., Disp: 60 tablet, Rfl: 5  •  simvastatin (ZOCOR) 20 MG tablet, Take 1 tablet by mouth Every Night., Disp: 30 tablet, Rfl: 5  •  vitamin D (ERGOCALCIFEROL) 35469 units capsule capsule, Take 1 capsule by mouth 1 (One) Time Per Week., Disp: 4 capsule, Rfl: 5  •  vitamin D (ERGOCALCIFEROL) 68730 units capsule capsule, TAKE 1 CAPSULE BY MOUTH ONCE WEEKLY, Disp: 4 capsule, Rfl: 5  •  Nicotine 21-14-7 MG/24HR kit, Place 1 patch on the skin as directed by provider Daily., Disp: 56 each, Rfl: 1    Current Facility-Administered Medications:   •  cyanocobalamin injection 1,000 mcg, 1,000 mcg, Intramuscular, Q28 Days, Jaleesa Contreras APRN, 1,000 mcg at 01/23/19 1230    Past  Medical History:   Diagnosis Date   • Allergic rhinitis    • Anxiety disorder    • Cellulitis and abscess     labia and groin   • Conjunctival folliculosis    • Constipation    • Controlled type 2 DM with peripheral circulatory disorder (CMS/HCC)    • Degenerative disc disease at L5-S1 level    • Dermatitis due to drug    • Diverticulitis    • Fistula    • Flushing    • Herniated cervical disc    • Hypertension    • Hypertension    • Impacted cerumen    • Lumbago    • Malaise and fatigue    • Nausea    • Osteoarthritis, multiple sites    • Sleep apnea    • Swelling, limb    • Tobacco use    • Vitamin D deficiency      Past Surgical History:   Procedure Laterality Date   •  SECTION     • CHOLECYSTECTOMY     • COLONOSCOPY N/A 2016    Procedure: COLONOSCOPY;  Surgeon: Bakari Pruitt MD;  Location: [facility];  Service:    • FINGER REIMPLANTATION     • LAPAROSCOPIC GASTRIC BANDING     • TYMPANOPLASTY     • WISDOM TOOTH EXTRACTION       Family History   Problem Relation Age of Onset   • Arthritis Mother    • Asthma Mother    • Cancer Mother    • COPD Mother    • Depression Mother    • Diabetes Mother    • Heart disease Mother    • Hyperlipidemia Mother    • Hypertension Mother    • Arthritis Father    • Asthma Father    • COPD Father    • Diabetes Father    • Hyperlipidemia Father    • Hypertension Father    • Arthritis Sister    • Diabetes Sister    • Hyperlipidemia Sister    • Hypertension Sister    • Arthritis Brother    • Diabetes Brother    • Hyperlipidemia Brother    • Hypertension Brother    • Diabetes Maternal Grandmother    • Hyperlipidemia Maternal Grandmother    • Hypertension Maternal Grandmother    • Diabetes Maternal Grandfather    • Hyperlipidemia Maternal Grandfather    • Hypertension Maternal Grandfather    • Diabetes Paternal Grandmother    • Hyperlipidemia Paternal Grandmother    • Hypertension Paternal Grandmother    • Diabetes Paternal Grandfather    • Hyperlipidemia Paternal  "Grandfather    • Hypertension Paternal Grandfather      Social History     Tobacco Use   • Smoking status: Current Every Day Smoker     Packs/day: 1.00     Years: 30.00     Pack years: 30.00     Types: Cigarettes   • Smokeless tobacco: Never Used   Substance Use Topics   • Alcohol use: No   • Drug use: No       Review of Systems   Constitution: Positive for malaise/fatigue. Negative for chills, diaphoresis and fever.   Cardiovascular: Positive for chest pain and leg swelling. Negative for orthopnea, palpitations and paroxysmal nocturnal dyspnea.   Respiratory: Negative for cough, hemoptysis and shortness of breath.    Endocrine: Negative for cold intolerance and heat intolerance.   Hematologic/Lymphatic: Does not bruise/bleed easily.   Skin: Negative for rash.   Musculoskeletal: Positive for back pain and muscle weakness. Negative for myalgias.   Gastrointestinal: Negative for abdominal pain, constipation, diarrhea, nausea and vomiting.   Genitourinary: Negative for dysuria and hematuria.   Neurological: Positive for light-headedness and numbness. Negative for dizziness and focal weakness.   Psychiatric/Behavioral: Positive for depression.       Objective   Blood pressure 105/65, pulse 70, resp. rate 16, height 165.1 cm (65\"), weight 79.6 kg (175 lb 6.4 oz).  Body mass index is 29.19 kg/m².        Physical Exam   Constitutional: She is oriented to person, place, and time. She appears well-developed and well-nourished.   HENT:   Mouth/Throat: Oropharynx is clear and moist.   Eyes: EOM are normal. Pupils are equal, round, and reactive to light.   Neck: Neck supple. No JVD present. No tracheal deviation present. No thyromegaly present.   Cardiovascular: Normal rate, regular rhythm, S1 normal and S2 normal. Exam reveals no gallop, no S3, no S4 and no friction rub.   No murmur heard.  Pulmonary/Chest: Effort normal and breath sounds normal.   Abdominal: Soft. Bowel sounds are normal. She exhibits no mass. There is no " tenderness.   Musculoskeletal: Normal range of motion. She exhibits no edema.   Lymphadenopathy:     She has no cervical adenopathy.   Neurological: She is alert and oriented to person, place, and time.   Skin: Skin is warm and dry. No rash noted.   Psychiatric: She has a normal mood and affect.       Lab Results   Component Value Date     05/17/2018    K 4.3 05/17/2018     05/17/2018    CO2 27.5 05/17/2018    BUN 8 05/17/2018    CREATININE 0.49 05/17/2018    GLUCOSE 107 07/12/2017    CALCIUM 8.4 05/17/2018    AST 15 05/17/2018    ALT 19 05/17/2018    ALKPHOS 71 05/17/2018    LABIL2 2.1 05/17/2018     Lab Results   Component Value Date    CKTOTAL 87 11/05/2015     Lab Results   Component Value Date    WBC 9.05 05/17/2018    HGB 12.5 05/17/2018    HCT 38.5 05/17/2018     05/17/2018     Lab Results   Component Value Date    INR 0.98 10/05/2016     No results found for: MG  Lab Results   Component Value Date    TSH 1.845 05/17/2018    CHLPL 204 (H) 11/28/2017    TRIG 216 (H) 11/28/2017    HDL 43 (L) 11/28/2017     (H) 11/28/2017        ECG 12 Lead  Date/Time: 1/28/2019 9:25 AM  Performed by: Fernie Trejo MD  Authorized by: Fernie Trejo MD   Rhythm: sinus rhythm  BPM: 66  Other findings: PRWP              Assessment/Plan    Diagnosis Plan   1. Precordial pain  Stress Test With Myocardial Perfusion.   2. Essential hypertension     3. Tobacco abuse     4. Gastroesophageal reflux disease    5. Spinal stenosis, lumbar region, with neurogenic claudication        Recommendations:    Orders Placed This Encounter   Procedures   • Stress Test With Myocardial Perfusion (1 Day)   • ECG 12 Lead        1. Start enteric-coated aspirin 81 mg daily.  2. Evaluate further with a Lexiscan sestamibi study ( due to chronic back pains).  I advised Mirian of the risks of continuing to use tobacco, and I provided her with tobacco cessation educational materials in the After Visit Summary. During this visit, I  spent 5 minutes counseling the patient regarding tobacco cessation.    Return in about 6 weeks (around 3/11/2019).    As always, Jaleesa  I appreciate very much the opportunity to participate in the cardiovascular care of your patients. Please do not hesitate to call me with any questions with regards to Mirian Fischer's evaluation and management.       With Best Regards,        Fernie Trejo MD, [facility]    Dragon disclaimer:  Much of this encounter note is an electronic transcription/translation of spoken language to printed text. The electronic translation of spoken language may permit erroneous, or at times, nonsensical words or phrases to be inadvertently transcribed; Although I have reviewed the note for such errors, some may still exist.

## 2019-01-28 NOTE — TELEPHONE ENCOUNTER
Syeda from [facility] called about patients nicotine patches prescribed?     Thanks!

## 2019-02-21 ENCOUNTER — HOSPITAL ENCOUNTER (OUTPATIENT)
Dept: NUCLEAR MEDICINE | Facility: HOSPITAL | Age: 49
Discharge: HOME | End: 2019-02-21
Attending: INTERNAL MEDICINE

## 2019-02-21 ENCOUNTER — HOSPITAL ENCOUNTER (OUTPATIENT)
Dept: CARDIOLOGY | Facility: HOSPITAL | Age: 49
Discharge: HOME | End: 2019-02-21
Attending: INTERNAL MEDICINE

## 2019-02-21 DIAGNOSIS — R07.2 PRECORDIAL PAIN: ICD-10-CM

## 2019-02-21 LAB
BH CV NUCLEAR PRIOR STUDY: 3
BH CV STRESS BP STAGE 1: NORMAL
BH CV STRESS BP STAGE 2: NORMAL
BH CV STRESS COMMENTS STAGE 1: NORMAL
BH CV STRESS COMMENTS STAGE 2: NORMAL
BH CV STRESS DOSE REGADENOSON STAGE 1: 0.4
BH CV STRESS DURATION MIN STAGE 1: 0
BH CV STRESS DURATION MIN STAGE 2: 4
BH CV STRESS DURATION SEC STAGE 1: 10
BH CV STRESS DURATION SEC STAGE 2: 0
BH CV STRESS HR STAGE 1: 103
BH CV STRESS HR STAGE 2: 91
BH CV STRESS PROTOCOL 1: NORMAL
BH CV STRESS RECOVERY BP: NORMAL MMHG
BH CV STRESS RECOVERY HR: 91 BPM
BH CV STRESS STAGE 1: 1
BH CV STRESS STAGE 2: 2
LV EF NUC BP: 67 %
MAXIMAL PREDICTED HEART RATE: 170 BPM
PERCENT MAX PREDICTED HR: 60.59 %
STRESS BASELINE BP: NORMAL MMHG
STRESS BASELINE HR: 81 BPM
STRESS PERCENT HR: 71 %
STRESS POST PEAK BP: NORMAL MMHG
STRESS POST PEAK HR: 103 BPM
STRESS TARGET HR: 145 BPM

## 2019-02-21 PROCEDURE — 0 TECHNETIUM SESTAMIBI: Performed by: INTERNAL MEDICINE

## 2019-02-21 PROCEDURE — 93017 CV STRESS TEST TRACING ONLY: CPT

## 2019-02-21 PROCEDURE — 93018 CV STRESS TEST I&R ONLY: CPT | Performed by: INTERNAL MEDICINE

## 2019-02-21 PROCEDURE — 25010000002 REGADENOSON 0.4 MG/5ML SOLUTION: Performed by: INTERNAL MEDICINE

## 2019-02-21 PROCEDURE — 78452 HT MUSCLE IMAGE SPECT MULT: CPT

## 2019-02-21 PROCEDURE — A9500 TC99M SESTAMIBI: HCPCS | Performed by: INTERNAL MEDICINE

## 2019-02-21 PROCEDURE — 78452 HT MUSCLE IMAGE SPECT MULT: CPT | Performed by: INTERNAL MEDICINE

## 2019-02-21 RX ADMIN — TECHNETIUM TC 99M SESTAMIBI 1 DOSE: 1 INJECTION INTRAVENOUS at 09:38

## 2019-02-21 RX ADMIN — REGADENOSON 0.4 MG: 0.08 INJECTION, SOLUTION INTRAVENOUS at 09:38

## 2019-02-21 RX ADMIN — TECHNETIUM TC 99M SESTAMIBI 1 DOSE: 1 INJECTION INTRAVENOUS at 07:45

## 2019-02-26 DIAGNOSIS — M51.36 DDD (DEGENERATIVE DISC DISEASE), LUMBAR: ICD-10-CM

## 2019-02-27 RX ORDER — HYDROCODONE BITARTRATE AND ACETAMINOPHEN 10; 325 MG/1; MG/1
1 TABLET ORAL EVERY 6 HOURS PRN
Qty: 90 TABLET | Refills: 0 | OUTPATIENT
Start: 2019-02-27

## 2019-02-27 RX ORDER — GABAPENTIN 600 MG/1
600 TABLET ORAL 3 TIMES DAILY
Qty: 90 TABLET | Refills: 2 | OUTPATIENT
Start: 2019-02-27

## 2019-02-28 ENCOUNTER — OFFICE VISIT (OUTPATIENT)
Dept: NEUROSURGERY | Facility: CLINIC | Age: 49
End: 2019-02-28

## 2019-02-28 ENCOUNTER — OFFICE VISIT (OUTPATIENT)
Dept: FAMILY MEDICINE CLINIC | Facility: CLINIC | Age: 49
End: 2019-02-28

## 2019-02-28 VITALS
BODY MASS INDEX: 28.99 KG/M2 | HEART RATE: 86 BPM | SYSTOLIC BLOOD PRESSURE: 110 MMHG | TEMPERATURE: 98.2 F | WEIGHT: 174 LBS | DIASTOLIC BLOOD PRESSURE: 70 MMHG | OXYGEN SATURATION: 99 % | HEIGHT: 65 IN

## 2019-02-28 VITALS
DIASTOLIC BLOOD PRESSURE: 69 MMHG | RESPIRATION RATE: 18 BRPM | TEMPERATURE: 97 F | HEIGHT: 65 IN | WEIGHT: 175 LBS | OXYGEN SATURATION: 98 % | SYSTOLIC BLOOD PRESSURE: 106 MMHG | HEART RATE: 71 BPM | BODY MASS INDEX: 29.16 KG/M2

## 2019-02-28 DIAGNOSIS — M51.36 DDD (DEGENERATIVE DISC DISEASE), LUMBAR: ICD-10-CM

## 2019-02-28 DIAGNOSIS — M15.9 PRIMARY OSTEOARTHRITIS INVOLVING MULTIPLE JOINTS: ICD-10-CM

## 2019-02-28 DIAGNOSIS — M48.062 SPINAL STENOSIS, LUMBAR REGION, WITH NEUROGENIC CLAUDICATION: Primary | ICD-10-CM

## 2019-02-28 DIAGNOSIS — J01.01 ACUTE RECURRENT MAXILLARY SINUSITIS: Primary | ICD-10-CM

## 2019-02-28 PROCEDURE — 99203 OFFICE O/P NEW LOW 30 MIN: CPT | Performed by: NEUROLOGICAL SURGERY

## 2019-02-28 PROCEDURE — 99407 BEHAV CHNG SMOKING > 10 MIN: CPT | Performed by: NURSE PRACTITIONER

## 2019-02-28 PROCEDURE — 96372 THER/PROPH/DIAG INJ SC/IM: CPT | Performed by: NURSE PRACTITIONER

## 2019-02-28 PROCEDURE — 99214 OFFICE O/P EST MOD 30 MIN: CPT | Performed by: NURSE PRACTITIONER

## 2019-02-28 RX ORDER — CEFTRIAXONE 1 G/1
1 INJECTION, POWDER, FOR SOLUTION INTRAMUSCULAR; INTRAVENOUS ONCE
Status: COMPLETED | OUTPATIENT
Start: 2019-02-28 | End: 2019-02-28

## 2019-02-28 RX ORDER — HYDROCODONE BITARTRATE AND ACETAMINOPHEN 10; 325 MG/1; MG/1
1 TABLET ORAL EVERY 6 HOURS PRN
Qty: 90 TABLET | Refills: 0 | Status: SHIPPED | OUTPATIENT
Start: 2019-02-28 | End: 2019-03-26 | Stop reason: SDUPTHER

## 2019-02-28 RX ORDER — VARENICLINE TARTRATE 1 MG/1
1 TABLET, FILM COATED ORAL 2 TIMES DAILY
Qty: 60 TABLET | Refills: 2 | Status: SHIPPED | OUTPATIENT
Start: 2019-02-28 | End: 2019-05-23 | Stop reason: SDUPTHER

## 2019-02-28 RX ORDER — METHYLPREDNISOLONE ACETATE 80 MG/ML
80 INJECTION, SUSPENSION INTRA-ARTICULAR; INTRALESIONAL; INTRAMUSCULAR; SOFT TISSUE ONCE
Status: COMPLETED | OUTPATIENT
Start: 2019-02-28 | End: 2019-02-28

## 2019-02-28 RX ORDER — VARENICLINE TARTRATE 0.5 MG/1
TABLET, FILM COATED ORAL
Qty: 11 TABLET | Refills: 0 | Status: SHIPPED | OUTPATIENT
Start: 2019-02-28 | End: 2019-10-15

## 2019-02-28 RX ADMIN — CEFTRIAXONE 1 G: 1 INJECTION, POWDER, FOR SOLUTION INTRAMUSCULAR; INTRAVENOUS at 14:49

## 2019-02-28 RX ADMIN — METHYLPREDNISOLONE ACETATE 80 MG: 80 INJECTION, SUSPENSION INTRA-ARTICULAR; INTRALESIONAL; INTRAMUSCULAR; SOFT TISSUE at 14:50

## 2019-02-28 RX ADMIN — CYANOCOBALAMIN 1000 MCG: 1000 INJECTION, SOLUTION INTRAMUSCULAR; SUBCUTANEOUS at 14:51

## 2019-02-28 NOTE — PROGRESS NOTES
Mirian Fischer  1970  [id number]      Chief Complaint   Patient presents with   • Neck Pain   • Arm Pain   • Back Pain   • Leg Pain       HISTORY OF PRESENT ILLNESS: Well-known to my service presenting with increasing back and bilateral leg pain.  She has known degenerative osteoarthritis associated with a spondylolisthesis L4-L5, disc bulging and degeneration L5-S1 and less so at L3-L4.  Her symptoms have increased.  She has had a lap band and lost weight.  Unfortunately she continued to smoke approximately 2 packs of cigarettes per day.  He has had increasing symptoms of claudication; her MRIs been repeated and she referred for neurosurgical consultation.    Past Medical History:   Diagnosis Date   • Allergic rhinitis    • Anxiety disorder    • Cellulitis and abscess     labia and groin   • Conjunctival folliculosis    • Constipation    • Controlled type 2 DM with peripheral circulatory disorder (CMS/HCC)    • Degenerative disc disease at L5-S1 level    • Dermatitis due to drug    • Diverticulitis    • Fistula    • Flushing    • Herniated cervical disc    • Hypertension    • Hypertension    • Impacted cerumen    • Lumbago    • Malaise and fatigue    • Nausea    • Osteoarthritis, multiple sites    • Sleep apnea    • Swelling, limb    • Tobacco use    • Vitamin D deficiency        Past Surgical History:   Procedure Laterality Date   •  SECTION     • CHOLECYSTECTOMY     • COLONOSCOPY N/A 2016    Procedure: COLONOSCOPY;  Surgeon: Bakari Pruitt MD;  Location: [facility];  Service:    • FINGER REIMPLANTATION     • LAPAROSCOPIC GASTRIC BANDING     • TYMPANOPLASTY     • WISDOM TOOTH EXTRACTION         Family History   Problem Relation Age of Onset   • Arthritis Mother    • Asthma Mother    • Cancer Mother    • COPD Mother    • Depression Mother    • Diabetes Mother    • Heart disease Mother    • Hyperlipidemia Mother    • Hypertension Mother    • Arthritis Father    • Asthma Father    • COPD  Father    • Diabetes Father    • Hyperlipidemia Father    • Hypertension Father    • Arthritis Sister    • Diabetes Sister    • Hyperlipidemia Sister    • Hypertension Sister    • Arthritis Brother    • Diabetes Brother    • Hyperlipidemia Brother    • Hypertension Brother    • Diabetes Maternal Grandmother    • Hyperlipidemia Maternal Grandmother    • Hypertension Maternal Grandmother    • Diabetes Maternal Grandfather    • Hyperlipidemia Maternal Grandfather    • Hypertension Maternal Grandfather    • Diabetes Paternal Grandmother    • Hyperlipidemia Paternal Grandmother    • Hypertension Paternal Grandmother    • Diabetes Paternal Grandfather    • Hyperlipidemia Paternal Grandfather    • Hypertension Paternal Grandfather        Social History     Socioeconomic History   • Marital status: Single     Spouse name: Not on file   • Number of children: Not on file   • Years of education: Not on file   • Highest education level: Not on file   Social Needs   • Financial resource strain: Not on file   • Food insecurity - worry: Not on file   • Food insecurity - inability: Not on file   • Transportation needs - medical: Not on file   • Transportation needs - non-medical: Not on file   Occupational History   • Not on file   Tobacco Use   • Smoking status: Current Every Day Smoker     Packs/day: 1.00     Years: 30.00     Pack years: 30.00     Types: Cigarettes   • Smokeless tobacco: Never Used   Substance and Sexual Activity   • Alcohol use: No   • Drug use: No   • Sexual activity: No   Other Topics Concern   • Not on file   Social History Narrative   • Not on file       No Known Allergies      Current Outpatient Medications:   •  atenolol (TENORMIN) 25 MG tablet, Take 1 tablet by mouth Every 12 (Twelve) Hours., Disp: 60 tablet, Rfl: 5  •  diclofenac (VOLTAREN) 75 MG EC tablet, Take 1 tablet by mouth 2 (Two) Times a Day., Disp: 60 tablet, Rfl: 5  •  DULoxetine (CYMBALTA) 60 MG capsule, Take 1 capsule by mouth Daily., Disp:  30 capsule, Rfl: 3  •  DULoxetine (CYMBALTA) 60 MG capsule, TAKE 1 CAPSULE BY MOUTH DAILY., Disp: 30 capsule, Rfl: 3  •  esomeprazole (nexIUM) 40 MG capsule, Take 1 capsule by mouth Every Morning Before Breakfast., Disp: 30 capsule, Rfl: 5  •  fluticasone (FLONASE) 50 MCG/ACT nasal spray, 1 spray by Each Nare route Daily., Disp: 16 g, Rfl: 5  •  gabapentin (NEURONTIN) 600 MG tablet, Take 1 tablet by mouth 3 (Three) Times a Day., Disp: 90 tablet, Rfl: 2  •  guaifenesin-dextromethorphan (ROBITUSSIN DM) 100-10 MG/5ML syrup, Take 5 mL by mouth 3 (Three) Times a Day As Needed for Congestion., Disp: 236 mL, Rfl: 0  •  hydrochlorothiazide (HYDRODIURIL) 25 MG tablet, TAKE 1 TABLET BY MOUTH DAILY., Disp: 30 tablet, Rfl: 5  •  HYDROcodone-acetaminophen (NORCO)  MG per tablet, Take 1 tablet by mouth Every 6 (Six) Hours As Needed for Moderate Pain ., Disp: 90 tablet, Rfl: 0  •  Lactobacillus-Inulin ([facility] DIGESTIVE HEALTH) capsule, Take 1 capsule by mouth 2 (Two) Times a Day., Disp: 60 capsule, Rfl: 5  •  linaclotide (LINZESS) 290 MCG capsule capsule, Take 1 capsule by mouth Every Morning Before Breakfast., Disp: 30 capsule, Rfl: 5  •  lisinopril (PRINIVIL,ZESTRIL) 10 MG tablet, Take 1 tablet by mouth Daily., Disp: 30 tablet, Rfl: 5  •  loratadine-pseudoephedrine (ALAVERT ALLERGY/SINUS) 5-120 MG per 12 hr tablet, Take 1 tablet by mouth 2 (Two) Times a Day., Disp: 10 tablet, Rfl: 1  •  MedroxyPROGESTERone Acetate 150 MG/ML suspension prefilled syringe, INJECT 1ML INTO THE SHOULDER,THIGH OR BUTTOCKS EVERY 3 MONTHS, Disp: 1 mL, Rfl: 3  •  methocarbamol (ROBAXIN) 750 MG tablet, Take 1 tablet by mouth 2 (Two) Times a Day., Disp: 60 tablet, Rfl: 5  •  Multiple Vitamins-Minerals (MULTI-VITAMIN GUMMIES) chewable tablet, Chew 2 each Daily., Disp: 100 tablet, Rfl: 5  •  Nicotine 21-14-7 MG/24HR kit, Place 1 patch on the skin as directed by provider Daily., Disp: 56 each, Rfl: 1  •  nizatidine (AXID) 150 MG capsule, Take 1  capsule by mouth 2 (Two) Times a Day., Disp: 60 capsule, Rfl: 5  •  ondansetron (ZOFRAN) 4 MG tablet, Take 4 mg by mouth Every 6 (Six) Hours As Needed for Nausea or Vomiting., Disp: , Rfl:   •  potassium chloride (K-DUR) 10 MEQ CR tablet, TAKE 1 TABLET BY MOUTH TWICE DAILY, Disp: 60 tablet, Rfl: 2  •  potassium chloride (K-DUR,KLOR-CON) 10 MEQ CR tablet, Take 1 tablet by mouth 2 (Two) Times a Day., Disp: 60 tablet, Rfl: 5  •  simvastatin (ZOCOR) 20 MG tablet, Take 1 tablet by mouth Every Night., Disp: 30 tablet, Rfl: 5  •  vitamin D (ERGOCALCIFEROL) 89256 units capsule capsule, Take 1 capsule by mouth 1 (One) Time Per Week., Disp: 4 capsule, Rfl: 5  •  vitamin D (ERGOCALCIFEROL) 40986 units capsule capsule, TAKE 1 CAPSULE BY MOUTH ONCE WEEKLY, Disp: 4 capsule, Rfl: 5    Current Facility-Administered Medications:   •  cyanocobalamin injection 1,000 mcg, 1,000 mcg, Intramuscular, Q28 Days, Jaleesa Contreras APRN, 1,000 mcg at 01/23/19 1230    Review of Systems   Constitutional: Positive for activity change, appetite change, chills and fatigue.   HENT: Positive for postnasal drip, rhinorrhea, sinus pressure, sinus pain, sneezing and sore throat.    Respiratory: Positive for apnea and cough.    Cardiovascular: Positive for leg swelling.   Gastrointestinal: Positive for constipation, nausea and rectal pain.   Endocrine: Positive for cold intolerance and polydipsia.   Genitourinary: Positive for dysuria, enuresis and pelvic pain.   Musculoskeletal: Positive for arthralgias, back pain, gait problem, joint swelling, myalgias, neck pain and neck stiffness.   Allergic/Immunologic: Positive for environmental allergies.   Neurological: Positive for dizziness, weakness, light-headedness and numbness.   Psychiatric/Behavioral: Positive for agitation, decreased concentration and dysphoric mood. The patient is nervous/anxious.    All other systems reviewed and are negative.      Vitals:    02/28/19 1205   BP: 106/69   BP  "Location: Right arm   Patient Position: Sitting   Pulse: 71   Resp: 18   Temp: 97 °F (36.1 °C)   SpO2: 98%   Weight: 79.4 kg (175 lb)   Height: 165.1 cm (65\")       Neurological Examination:    Mental status/speech: The patient is alert and oriented.  Speech is clear without aphysia or dysarthria.  No overt cognitive deficits.    Cranial nerve examination:    Olfaction: Smell is intact.  Vision: Vision is intact without visual field abnormalities.  Funduscopic examination is normal.  No pupillary irregularity.  Ocular motor examination: The extraocular muscles are intact.  There is no diplopia.  The pupil is round and reactive to both light and accommodation.  There is no nystagmus.  Facial movement/sensation: There is no facial weakness.  Sensation is intact in the first, second, and third divisions of the trigeminal nerve.  The corneal reflex is intact.  Auditory: Hearing is intact to finger rub bilaterally.  Cranial nerves IX, X, XI, XII: Phonation is normal.  No dysphagia.  Tongue is protruded in the midline without atrophy.  The gag reflex is intact.  Shoulder shrug is normal.    Musculoligamentous ligamentous examination: She has limitation of range of motion both the cervical lumbar spine.  Straight leg raising the Sagon flip test are negative.  The left Achilles reflex is diminished while the others are easily elicitable.  There is no weakness.  There is no dysmetria.          Medical Decision Making:     Diagnostic Data Set: I have compared the lumbar MRI done recently with that of 2016.  They are essentially the same.  She has a an acquired listhesis at L4-L5 with high-grade spinal stenosis.  She has a disc protrusion L5-S1 which is central.  It is actually slightly smaller now than it was in 2016.  At L3-4 she has mild spinal stenosis.      Assessment: Spinal stenosis          Recommendations: She is a candidate for surgical intervention however her smoking precludes an acceptable outcome.  The fusion " simply will not take.  She will need to have spinal fusion from L3 through S1 in order to treat this adequately.  She will take all this advisability and call me when she is made a decision.  At the present time it is not mandatory other than her symptoms.        I greatly appreciate the opportunity to see and evaluate this individual.  If you have questions or concerns regarding issues that I may have overlooked please call me at any time: [phone].  Patrick Teran M.D.  [facility]  [address] [place] [address].  [facility]  [id number]

## 2019-02-28 NOTE — PROGRESS NOTES
Subjective   Mirian Fischer is a 49 y.o. female.     Chief Complaint   Patient presents with   • Diabetes   • Back Pain       History of Present Illness:    Patient was seen today by Dr. Anatoly Teran for neurology consult.  Has reviewed her recent MRI discussed his recommendation for surgery.  Patient has degenerative osteoarthritis associated with spondylolithiasis L4-L5, disc bulging and degeneration L5-S1 and L3-L4.  She has chronic low back pain rated 8 on a pain scale rating of 0-10 today.  Patient does have presence of some urinary incontinence at times as well as constipation.  Patient has been seen by multiple physical therapist and neurologist in the past.  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  Patient currently receives Norco and Neurontin.  She has no history of substance abuse or addiction.    See neurosurgeon consult by Dr. Teran with today's date for further information.    Patient complains of sinus pressure and drainage times 24 hours-history of recurrent sinusitis.  She is a current smoker.    Smoker-patient has been recommended for spinal fusion.  Her neurologist has told her that she must stop smoking prior to spinal surgery.  Patient currently smokes around a pack a day.  She is willing to go back on Chantix which helped her with cessation in the past.        The following portions of the patient's history were reviewed and updated as appropriate:  Allergies, current medications, past family history, past medical history, past social history, past surgical history and problem list.    Review of Systems   Constitutional: Positive for activity change. Negative for appetite change, fatigue and unexpected weight change.   HENT: Positive for sinus pressure and sinus pain. Negative for congestion, ear pain, nosebleeds, postnasal drip, rhinorrhea, sore throat, trouble swallowing and voice change.    Eyes: Negative for pain and visual  "disturbance.   Respiratory: Negative for cough, shortness of breath and wheezing.    Cardiovascular: Negative for chest pain and palpitations.   Gastrointestinal: Negative for abdominal pain, blood in stool, constipation and diarrhea.   Endocrine: Negative for cold intolerance and polydipsia.   Genitourinary: Negative for difficulty urinating, flank pain and hematuria.   Musculoskeletal: Positive for arthralgias, back pain and myalgias. Negative for gait problem and joint swelling.   Skin: Negative for color change and rash.   Allergic/Immunologic: Negative.    Neurological: Negative for syncope, numbness and headaches.   Hematological: Negative.    Psychiatric/Behavioral: Negative for dysphoric mood, self-injury, sleep disturbance and suicidal ideas.   All other systems reviewed and are negative.      Objective     /70   Pulse 86   Temp 98.2 °F (36.8 °C) (Tympanic)   Ht 165.1 cm (65\")   Wt 78.9 kg (174 lb)   SpO2 99%   BMI 28.96 kg/m²   Hospital Outpatient Visit on 02/21/2019   Component Date Value Ref Range Status   • Nuclear Prior Study 02/21/2019 3   Final   • Target HR (85%) 02/21/2019 145  bpm Final   • Max. Pred. HR (100%) 02/21/2019 170  bpm Final   • BH CV STRESS PROTOCOL 1 02/21/2019 Pharmacologic   Final   • Stage 1 02/21/2019 1   Final   • HR Stage 1 02/21/2019 103   Final   • BP Stage 1 02/21/2019 112/61   Final   • Duration Min Stage 1 02/21/2019 0   Final   • Duration Sec Stage 1 02/21/2019 10   Final   • Stress Dose Regadenoson Stage 1 02/21/2019 0.4   Final   • Stress Comments Stage 1 02/21/2019 10 sec bolus injection   Final   • Stage 2 02/21/2019 2   Final   • HR Stage 2 02/21/2019 91   Final   • BP Stage 2 02/21/2019 115/64   Final   • Duration Min Stage 2 02/21/2019 4   Final   • Duration Sec Stage 2 02/21/2019 0   Final   • Stress Comments Stage 2 02/21/2019 recovery   Final   • Baseline HR 02/21/2019 81  bpm Final   • Baseline BP 02/21/2019 116/59  mmHg Final   • Peak HR 02/21/2019 " 103  bpm Final   • Percent Max Pred HR 02/21/2019 60.59  % Final   • Percent Target HR 02/21/2019 71  % Final   • Peak BP 02/21/2019 112/61  mmHg Final   • Recovery HR 02/21/2019 91  bpm Final   • Recovery BP 02/21/2019 115/64  mmHg Final   • Nuc Stress EF 02/21/2019 67  % Final       Physical Exam   Constitutional: She is oriented to person, place, and time. Vital signs are normal. She appears well-developed and well-nourished. No distress.   Pleasant and appropriate 49 year old female.    HENT:   Head: Normocephalic.   Right Ear: Hearing, external ear and ear canal normal. Tympanic membrane is bulging.   Left Ear: Hearing, external ear and ear canal normal. Tympanic membrane is bulging.   Nose: Right sinus exhibits maxillary sinus tenderness. Right sinus exhibits no frontal sinus tenderness. Left sinus exhibits maxillary sinus tenderness and frontal sinus tenderness.   Mouth/Throat: Oropharynx is clear and moist. No oropharyngeal exudate.   Eyes: Conjunctivae are normal. Pupils are equal, round, and reactive to light. Right eye exhibits no discharge. Left eye exhibits no discharge.   Neck: Normal range of motion. Neck supple. No tracheal deviation present. No thyromegaly present.   Cardiovascular: Normal rate, regular rhythm and normal heart sounds. Exam reveals no gallop and no friction rub.   No murmur heard.  Pulmonary/Chest: Effort normal and breath sounds normal. No respiratory distress. She has no wheezes. She has no rales. She exhibits no tenderness.   Abdominal: Soft. Bowel sounds are normal. She exhibits no distension and no mass. There is no tenderness. There is no rebound and no guarding.   Musculoskeletal:        Lumbar back: She exhibits decreased range of motion, tenderness, pain and spasm.        Right foot: There is no swelling.        Left foot: There is no swelling.   Neurological: She is alert and oriented to person, place, and time. She is not disoriented. A sensory deficit (Decreased soft and  sharp sensation bilateral lower extremities) is present. No cranial nerve deficit. She exhibits abnormal muscle tone.   Reflex Scores:       Patellar reflexes are 1+ on the right side and 2+ on the left side.  Skin: Skin is warm and dry. Capillary refill takes less than 2 seconds. No rash noted. She is not diaphoretic. No erythema.   Psychiatric: She has a normal mood and affect. Her speech is normal and behavior is normal. Judgment and thought content normal. Cognition and memory are normal.   Vitals reviewed.      Assessment/Plan     Problem List Items Addressed This Visit        Musculoskeletal and Integument    DDD (degenerative disc disease), lumbar    Overview     1/4/2019 MRI Lumbar Spine findings  Severe multilevel degenerative disc disease in combination with facet  arthropathy and congenital shortening of the pedicles resulting in  generalized central canal stenosis detailed above.  2. Superimposed disc herniations at L1/2 and L5/S1 contributing to  central canal stenosis and producing mass effect on the central nerve  roots.  3. Severe right neural foraminal stenosis L5/S1 with mass effect on  exiting L5 nerve root laterally.  4. Degenerative type spondylolisthesis anteriorly 8 mm of L4 on L5.  5. Hypertrophic facet arthropathy with periarticular inflammation of  L3/4 and L4/5.            Relevant Medications    HYDROcodone-acetaminophen (NORCO)  MG per tablet      Other Visit Diagnoses     Acute recurrent maxillary sinusitis    -  Primary    Relevant Medications    cefTRIAXone (ROCEPHIN) injection 1 g    methylPREDNISolone acetate (DEPO-medrol) injection 80 mg        Proper body mechanics has been reviewed and discussed today.      As part of this patient's treatment plan they are being prescribed controlled substance/substances with potential for abuse. This patient has been made aware of the appropriate use of such medications, including potential risk of somnolence, limited ability to drive and  / or work safely, and potential for overdose. It has also been made clear that these medications are for use by this patient only, without concomitant use of alcohol or other substances unless prescribed/advised by medical provider. Patient has completed controlled substance agreement detailing terms of continued prescribing of controlled substances including monitoring Marques reports, drug screens and pill counts. The patient was asked and states they are not receiving narcotic medication from any there provider or any provider that this office has not been made aware of. History and physical exam exhibit continued safe and appropriate use of controlled substances.   Goal: Improved quality of life and reduction in pain as evidenced by pt report.   Marques has been reviewed as consistent.  UDS is on file.   Norco prescription provided  Patient has been instructed and counseled regarding opioid misuse and risk of addiction.  We have discussed proper storage and disposal of controlled medication.  Pt is at low risk for substance abuse or addiction. Pt will be monitored closely for compliance and the need to continue plan of care.    Fluids, rest, symptomatic treatment advised. Steam therapy. Dispose of tooth bush after 24 hours of starting antibiotics if ordered. Complete antibiotics as ordered.  Discussed possible side effects/interactions of medication. Discussed symptoms to report as well as reasons to seek urgent or emergent medical attention. Understanding stated.   Recommend follow up in 2-3 days if not improved, sooner if needed.        Patient's Body mass index is 28.96 kg/m². BMI is above normal parameters. Recommendations include: exercise counseling and nutrition counseling.    I advised Mirian of the risks of continuing to use tobacco, and I provided her with tobacco cessation educational materials in the After Visit Summary.     During this visit, I spent 11 minutes counseling the patient regarding  tobacco cessation.  Smoke cessation education provided.  We have established a quit date. Reviewed the adverse effects of smoking. Discussed community programs as well as medical options to assist with cessation. Total time today spent on smoke cessation education 11 minutes. We have discussed the risk versus benefits of nicotine patches, Wellbutrin and Chantix.  We have discussed methods to distract from the habit of having a cigarette in hand such as carrying a strong chewing gum.  Individualized plan of care has been developed.   Start Chantix    I have discussed diagnosis in detail today allowing time for questions and answers. Patient is aware of reasons to seek urgent or emergent medical care as well as reasons to return to the clinic for evaluation. Possible side effects, interactions and progression of symptoms discussed as well. Patient / family states understanding.   Emotional support and active listening provided.     RTC 2-5 days if not improved, sooner if condition worsens/changes. Symptomatic care advised as well as reasons for urgent or emergent care. Pt / family state understanding.     Follow up in one month, sooner if needed. Routine labs every 3-6 months.     Dictated utilizing Dragon dictation        This document has been electronically signed by:  CHARMAINE Doran, NP-C

## 2019-03-26 ENCOUNTER — OFFICE VISIT (OUTPATIENT)
Dept: FAMILY MEDICINE CLINIC | Facility: CLINIC | Age: 49
End: 2019-03-26

## 2019-03-26 VITALS
SYSTOLIC BLOOD PRESSURE: 110 MMHG | HEART RATE: 74 BPM | HEIGHT: 65 IN | OXYGEN SATURATION: 99 % | WEIGHT: 179 LBS | DIASTOLIC BLOOD PRESSURE: 80 MMHG | TEMPERATURE: 98.4 F | BODY MASS INDEX: 29.82 KG/M2

## 2019-03-26 DIAGNOSIS — K61.2: ICD-10-CM

## 2019-03-26 DIAGNOSIS — Z79.899 LONG-TERM USE OF HIGH-RISK MEDICATION: Primary | ICD-10-CM

## 2019-03-26 DIAGNOSIS — G56.20 CUBITAL TUNNEL SYNDROME, UNSPECIFIED LATERALITY: ICD-10-CM

## 2019-03-26 DIAGNOSIS — M51.36 DDD (DEGENERATIVE DISC DISEASE), LUMBAR: Primary | ICD-10-CM

## 2019-03-26 DIAGNOSIS — I10 ESSENTIAL HYPERTENSION: ICD-10-CM

## 2019-03-26 DIAGNOSIS — D51.3 OTHER DIETARY VITAMIN B12 DEFICIENCY ANEMIA: ICD-10-CM

## 2019-03-26 DIAGNOSIS — K57.20 DIVERTICULITIS OF LARGE INTESTINE WITH PERFORATION WITHOUT BLEEDING: ICD-10-CM

## 2019-03-26 DIAGNOSIS — E55.9 VITAMIN D DEFICIENCY: ICD-10-CM

## 2019-03-26 DIAGNOSIS — Z12.39 SCREENING FOR BREAST CANCER: ICD-10-CM

## 2019-03-26 DIAGNOSIS — Z98.84 HX OF LAPAROSCOPIC GASTRIC BANDING: ICD-10-CM

## 2019-03-26 DIAGNOSIS — N82.4 COLOVAGINAL FISTULA: ICD-10-CM

## 2019-03-26 DIAGNOSIS — E11.51 CONTROLLED TYPE 2 DM WITH PERIPHERAL CIRCULATORY DISORDER (HCC): ICD-10-CM

## 2019-03-26 DIAGNOSIS — J32.0 MAXILLARY SINUSITIS, UNSPECIFIED CHRONICITY: ICD-10-CM

## 2019-03-26 DIAGNOSIS — E53.8 VITAMIN B 12 DEFICIENCY: ICD-10-CM

## 2019-03-26 DIAGNOSIS — R30.0 DYSURIA: ICD-10-CM

## 2019-03-26 DIAGNOSIS — Z72.0 TOBACCO USE: ICD-10-CM

## 2019-03-26 DIAGNOSIS — F32.9 REACTIVE DEPRESSION: ICD-10-CM

## 2019-03-26 DIAGNOSIS — K59.01 SLOW TRANSIT CONSTIPATION: ICD-10-CM

## 2019-03-26 LAB
BILIRUB BLD-MCNC: NEGATIVE MG/DL
BILIRUB BLD-MCNC: NORMAL MG/DL
CLARITY, POC: ABNORMAL
CLARITY, POC: NORMAL
COLOR UR: NORMAL
COLOR UR: YELLOW
GLUCOSE UR STRIP-MCNC: NEGATIVE MG/DL
GLUCOSE UR STRIP-MCNC: NORMAL MG/DL
KETONES UR QL: NEGATIVE
KETONES UR QL: NORMAL
LEUKOCYTE EST, POC: NEGATIVE
LEUKOCYTE EST, POC: NORMAL
NITRITE UR-MCNC: NEGATIVE MG/ML
NITRITE UR-MCNC: NORMAL MG/ML
PH UR: 7 [PH] (ref 5–8)
PH UR: NORMAL [PH] (ref 5–8)
PROT UR STRIP-MCNC: NEGATIVE MG/DL
PROT UR STRIP-MCNC: NORMAL MG/DL
RBC # UR STRIP: NEGATIVE /UL
RBC # UR STRIP: NORMAL /UL
SP GR UR: 1.01 (ref 1–1.03)
SP GR UR: NORMAL (ref 1–1.03)
UROBILINOGEN UR QL: NORMAL
UROBILINOGEN UR QL: NORMAL

## 2019-03-26 PROCEDURE — 96372 THER/PROPH/DIAG INJ SC/IM: CPT | Performed by: NURSE PRACTITIONER

## 2019-03-26 PROCEDURE — G0439 PPPS, SUBSEQ VISIT: HCPCS | Performed by: NURSE PRACTITIONER

## 2019-03-26 PROCEDURE — 99213 OFFICE O/P EST LOW 20 MIN: CPT | Performed by: NURSE PRACTITIONER

## 2019-03-26 PROCEDURE — 81002 URINALYSIS NONAUTO W/O SCOPE: CPT | Performed by: NURSE PRACTITIONER

## 2019-03-26 PROCEDURE — 99406 BEHAV CHNG SMOKING 3-10 MIN: CPT | Performed by: NURSE PRACTITIONER

## 2019-03-26 PROCEDURE — G0444 DEPRESSION SCREEN ANNUAL: HCPCS | Performed by: NURSE PRACTITIONER

## 2019-03-26 PROCEDURE — 96160 PT-FOCUSED HLTH RISK ASSMT: CPT | Performed by: NURSE PRACTITIONER

## 2019-03-26 RX ORDER — SULFAMETHOXAZOLE AND TRIMETHOPRIM 800; 160 MG/1; MG/1
1 TABLET ORAL 2 TIMES DAILY
Qty: 20 TABLET | Refills: 0 | Status: SHIPPED | OUTPATIENT
Start: 2019-03-26 | End: 2019-04-05

## 2019-03-26 RX ORDER — HYDROCODONE BITARTRATE AND ACETAMINOPHEN 10; 325 MG/1; MG/1
1 TABLET ORAL EVERY 6 HOURS PRN
Qty: 90 TABLET | Refills: 0 | Status: SHIPPED | OUTPATIENT
Start: 2019-03-26 | End: 2019-04-24 | Stop reason: SDUPTHER

## 2019-03-26 RX ADMIN — CYANOCOBALAMIN 1000 MCG: 1000 INJECTION, SOLUTION INTRAMUSCULAR; SUBCUTANEOUS at 11:04

## 2019-03-26 NOTE — ASSESSMENT & PLAN NOTE
Proper body mechanics has been reviewed and discussed today.      As part of this patient's treatment plan they are being prescribed controlled substance/substances with potential for abuse. This patient has been made aware of the appropriate use of such medications, including potential risk of somnolence, limited ability to drive and / or work safely, and potential for overdose. It has also been made clear that these medications are for use by this patient only, without concomitant use of alcohol or other substances unless prescribed/advised by medical provider. Patient has completed controlled substance agreement detailing terms of continued prescribing of controlled substances including monitoring Marques reports, drug screens and pill counts. The patient was asked and states they are not receiving narcotic medication from any there provider or any provider that this office has not been made aware of. History and physical exam exhibit continued safe and appropriate use of controlled substances.   Goal: Improved quality of life and reduction in pain as evidenced by pt report.   Marques #[id number] has been reviewed as consistent.  UDS is on file.     Patient has been instructed and counseled regarding opioid misuse and risk of addiction.  We have discussed proper storage and disposal of controlled medication.  Pt is at low risk for substance abuse or addiction. Pt will be monitored closely for compliance and the need to continue plan of care.

## 2019-03-26 NOTE — PROGRESS NOTES
Subsequent Medicare Wellness Visit   The ABC's of the Annual Wellness Visit    Chief Complaint   Patient presents with   • Medicare Wellness-subsequent   • Other     Abscess       HPI:  Mirian Fischer, -1970, is a 49 y.o. female who presents for a Subsequent Medicare Wellness Visit.    Patient is here today for her Medicare wellness exam.      She does have an acute complaint of a rectal abscess.    Carpal tunnel syndrome-under the care of neurology    Chronic pain/stenosis of the lumbar region with neurogenic claudication, herniated nucleus pulposus of the lumbar spine, osteoarthritis of multiple sites, bulging lumbar disc and degenerative disc disease of the lumbar spine.-she is under the care of neurology.  She does have radiology on file.  Patient currently receives Norco which helps decrease her pain.  Rates her pain as 7 on a pain scale rating of 0-10 today.  Pain is worse with activity.  Patient is under the care of a neurosurgeon at this time.  It is been recommended that she have surgery.  She has a handicapped child for which she provides 100 of his care which makes scheduling surgery difficult for her.  She has no history of substance abuse or addiction.  Her pain is described as sharp and aching with radiation into both lower extremities.  Patient is failed physical therapy and conservative treatment.    Vitamin D deficiency-stable supplementation    B12 deficiency-stable with supplementation    Tobacco use-patient is tried multiple times to quit smoking though she continues to smoke.    Anemia due to B12 deficiency-taking B12 supplementation    Essential hypertension-stable with current medication regimen.  Does check her blood pressure randomly.    Controlled type 2 diabetes-stable with lifestyle changes and weight loss  History of bariatric surgery-has a lap band with successful weight loss.  History of colovaginal fistula with surgical repair.    Carpal tunnel syndrome-bilateral  wrist.    History of diverticulitis with large intestine perforation without bleeding-no current rectal bleeding.    Reactive depression-stable with current antidepressant medication.  Denies thoughts of hurting self or others.  Depression was in relation to chronic health issues.    GERD-stable with Axid.      Acute complaint today of rectal abscess with tenderness and pain.  Patient states it has been there for a few weeks and she was hoping it would go away.  She has a history of recurrent abscesses which have required a surgical intervention in the past.  She denies fever or chills at this time.        Recent Hospitalizations:  No hospitalization(s) within the last year..    Current Medical Providers:  Patient Care Team:  Jaleesa Contreras APRN as PCP - General  Jaleesa Contreras APRN as PCP - Family Medicine    Health Habits and Functional and Cognitive Screening and Depression Screening:  Functional & Cognitive Status 3/26/2019   Do you have difficulty preparing food and eating? Yes   Do you have difficulty bathing yourself, getting dressed or grooming yourself? Yes   Do you have difficulty using the toilet? Yes   Do you have difficulty moving around from place to place? Yes   Do you have trouble with steps or getting out of a bed or a chair? Yes   In the past year have you fallen or experienced a near fall? Yes   Current Diet Unhealthy Diet   Dental Exam Not up to date   Eye Exam Not up to date   Exercise (times per week) 0 times per week   Current Exercise Activities Include None   Do you need help using the phone?  No   Are you deaf or do you have serious difficulty hearing?  Yes   Do you need help with transportation? Yes   Do you need help shopping? Yes   Do you need help preparing meals?  Yes   Do you need help with housework?  Yes   Do you need help with laundry? Yes   Do you need help taking your medications? No   Do you need help managing money? No   Do you ever drive or ride in a  car without wearing a seat belt? No   Who do you live with? Alone   If you need help, do you have trouble finding someone available to you? Yes   Have you been bothered in the last four weeks by sexual problems? No   Do you have difficulty concentrating, remembering or making decisions? Yes       Compared to one year ago, the patient feels her physical health is worse and her mental health is worse.    Depression Screen:  PHQ-2/PHQ-9 Depression Screening 3/26/2019   Little interest or pleasure in doing things 2   Feeling down, depressed, or hopeless 2   Trouble falling or staying asleep, or sleeping too much 2   Feeling tired or having little energy 3   Poor appetite or overeating 2   Feeling bad about yourself - or that you are a failure or have let yourself or your family down 2   Trouble concentrating on things, such as reading the newspaper or watching television 2   Moving or speaking so slowly that other people could have noticed. Or the opposite - being so fidgety or restless that you have been moving around a lot more than usual 2   Thoughts that you would be better off dead, or of hurting yourself in some way 0   Total Score 17   If you checked off any problems, how difficult have these problems made it for you to do your work, take care of things at home, or get along with other people? Very difficult     Patient declines behavioral health consult at this time.  She denies any thoughts of hurting self or others.  Continue current antidepressant medications.    Past Medical/Family/Social History:  The following portions of the patient's history were reviewed and updated as appropriate: allergies, current medications, past family history, past medical history, past social history, past surgical history and problem list.    No Known Allergies      Current Outpatient Medications:   •  atenolol (TENORMIN) 25 MG tablet, Take 1 tablet by mouth Every 12 (Twelve) Hours., Disp: 60 tablet, Rfl: 5  •  diclofenac  (VOLTAREN) 75 MG EC tablet, Take 1 tablet by mouth 2 (Two) Times a Day., Disp: 60 tablet, Rfl: 5  •  DULoxetine (CYMBALTA) 60 MG capsule, Take 1 capsule by mouth Daily., Disp: 30 capsule, Rfl: 3  •  esomeprazole (nexIUM) 40 MG capsule, Take 1 capsule by mouth Every Morning Before Breakfast., Disp: 30 capsule, Rfl: 5  •  fluticasone (FLONASE) 50 MCG/ACT nasal spray, 1 spray by Each Nare route Daily., Disp: 16 g, Rfl: 5  •  gabapentin (NEURONTIN) 600 MG tablet, Take 1 tablet by mouth 3 (Three) Times a Day., Disp: 90 tablet, Rfl: 2  •  guaifenesin-dextromethorphan (ROBITUSSIN DM) 100-10 MG/5ML syrup, Take 5 mL by mouth 3 (Three) Times a Day As Needed for Congestion., Disp: 236 mL, Rfl: 0  •  hydrochlorothiazide (HYDRODIURIL) 25 MG tablet, TAKE 1 TABLET BY MOUTH DAILY., Disp: 30 tablet, Rfl: 5  •  HYDROcodone-acetaminophen (NORCO)  MG per tablet, Take 1 tablet by mouth Every 6 (Six) Hours As Needed for Moderate Pain ., Disp: 90 tablet, Rfl: 0  •  Lactobacillus-Inulin ([facility] DIG[facility]) capsule, Take 1 capsule by mouth 2 (Two) Times a Day., Disp: 60 capsule, Rfl: 5  •  linaclotide (LINZESS) 290 MCG capsule capsule, Take 1 capsule by mouth Every Morning Before Breakfast., Disp: 30 capsule, Rfl: 5  •  lisinopril (PRINIVIL,ZESTRIL) 10 MG tablet, Take 1 tablet by mouth Daily., Disp: 30 tablet, Rfl: 5  •  loratadine-pseudoephedrine (ALAVERT ALLERGY/SINUS) 5-120 MG per 12 hr tablet, Take 1 tablet by mouth 2 (Two) Times a Day., Disp: 30 tablet, Rfl: 3  •  MedroxyPROGESTERone Acetate 150 MG/ML suspension prefilled syringe, INJECT 1ML INTO THE SHOULDER,THIGH OR BUTTOCKS EVERY 3 MONTHS, Disp: 1 mL, Rfl: 3  •  methocarbamol (ROBAXIN) 750 MG tablet, Take 1 tablet by mouth 2 (Two) Times a Day., Disp: 60 tablet, Rfl: 5  •  Multiple Vitamins-Minerals (MULTI-VITAMIN GUMMIES) chewable tablet, Chew 2 each Daily., Disp: 100 tablet, Rfl: 5  •  Nicotine 21-14-7 MG/24HR kit, Place 1 patch on the skin as directed by provider  Daily., Disp: 56 each, Rfl: 1  •  nizatidine (AXID) 150 MG capsule, Take 1 capsule by mouth 2 (Two) Times a Day., Disp: 60 capsule, Rfl: 5  •  ondansetron (ZOFRAN) 4 MG tablet, Take 4 mg by mouth Every 6 (Six) Hours As Needed for Nausea or Vomiting., Disp: , Rfl:   •  potassium chloride (K-DUR) 10 MEQ CR tablet, TAKE 1 TABLET BY MOUTH TWICE DAILY, Disp: 60 tablet, Rfl: 2  •  potassium chloride (K-DUR,KLOR-CON) 10 MEQ CR tablet, Take 1 tablet by mouth 2 (Two) Times a Day., Disp: 60 tablet, Rfl: 5  •  simvastatin (ZOCOR) 20 MG tablet, Take 1 tablet by mouth Every Night., Disp: 30 tablet, Rfl: 5  •  varenicline (CHANTIX CONTINUING MONTH JOHNATHAN) 1 MG tablet, Take 1 tablet by mouth 2 (Two) Times a Day., Disp: 60 tablet, Rfl: 2  •  varenicline (CHANTIX) 0.5 MG tablet, Daily for three days then twice daily for four days, Disp: 11 tablet, Rfl: 0  •  vitamin D (ERGOCALCIFEROL) 28398 units capsule capsule, Take 1 capsule by mouth 1 (One) Time Per Week., Disp: 4 capsule, Rfl: 5  •  vitamin D (ERGOCALCIFEROL) 95195 units capsule capsule, TAKE 1 CAPSULE BY MOUTH ONCE WEEKLY, Disp: 4 capsule, Rfl: 5  •  sulfamethoxazole-trimethoprim (BACTRIM DS,SEPTRA DS) 800-160 MG per tablet, Take 1 tablet by mouth 2 (Two) Times a Day for 10 days., Disp: 20 tablet, Rfl: 0    Current Facility-Administered Medications:   •  cyanocobalamin injection 1,000 mcg, 1,000 mcg, Intramuscular, Q28 Days, Jaleesa Contreras APRN, 1,000 mcg at 03/26/19 1104    Aspirin use counseling: Taking ASA appropriately as indicated    Current medication list contains high risk medications. Some potential harmful drug interactions identified. Plan of action: monitor for and educate pt on possible risks vs benefits    Family History   Problem Relation Age of Onset   • Arthritis Mother    • Asthma Mother    • Cancer Mother    • COPD Mother    • Depression Mother    • Diabetes Mother    • Heart disease Mother    • Hyperlipidemia Mother    • Hypertension Mother    •  Arthritis Father    • Asthma Father    • COPD Father    • Diabetes Father    • Hyperlipidemia Father    • Hypertension Father    • Arthritis Sister    • Diabetes Sister    • Hyperlipidemia Sister    • Hypertension Sister    • Arthritis Brother    • Diabetes Brother    • Hyperlipidemia Brother    • Hypertension Brother    • Diabetes Maternal Grandmother    • Hyperlipidemia Maternal Grandmother    • Hypertension Maternal Grandmother    • Diabetes Maternal Grandfather    • Hyperlipidemia Maternal Grandfather    • Hypertension Maternal Grandfather    • Diabetes Paternal Grandmother    • Hyperlipidemia Paternal Grandmother    • Hypertension Paternal Grandmother    • Diabetes Paternal Grandfather    • Hyperlipidemia Paternal Grandfather    • Hypertension Paternal Grandfather        Social History     Tobacco Use   • Smoking status: Current Every Day Smoker     Packs/day: 1.00     Years: 30.00     Pack years: 30.00     Types: Cigarettes   • Smokeless tobacco: Never Used   Substance Use Topics   • Alcohol use: No       Past Surgical History:   Procedure Laterality Date   •  SECTION     • CHOLECYSTECTOMY     • COLONOSCOPY N/A 2016    Procedure: COLONOSCOPY;  Surgeon: Bakari Pruitt MD;  Location: [facility];  Service:    • FINGER REIMPLANTATION     • LAPAROSCOPIC GASTRIC BANDING     • TYMPANOPLASTY     • WISDOM TOOTH EXTRACTION         Patient Active Problem List   Diagnosis   • Osteoarthritis, multiple sites   • HNP (herniated nucleus pulposus), lumbar   • Spinal stenosis, lumbar region, with neurogenic claudication   • Tobacco use   • Encounter for contraceptive management   • Diverticulitis of large intestine with perforation without bleeding   • DDD (degenerative disc disease), lumbar   • Bulge of lumbar disc without myelopathy   • Controlled type 2 DM with peripheral circulatory disorder (CMS/HCC)   • Essential hypertension   • Chronic left shoulder pain   • Cubital tunnel syndrome   • Anemia due to  vitamin B12 deficiency   • Urinary and bowel incontinence   • Colovaginal fistula   • Hx of laparoscopic gastric banding   • Slow transit constipation   • Nicotine addiction   • Stress incontinence   • Gastroesophageal reflux disease without esophagitis   • Normal gynecologic examination   • Encounter for surveillance of injectable contraceptive   • High risk medication use   • Vitamin B 12 deficiency   • Encounter for vaccination   • Vitamin D deficiency   • Reactive depression   • Excessive cerumen in both ear canals   • Abscess of anorectal fissure       Review of Systems   Constitutional: Positive for activity change. Negative for appetite change, chills, fatigue and unexpected weight change.   HENT: Positive for sinus pressure and sinus pain. Negative for congestion, ear pain, nosebleeds, postnasal drip, rhinorrhea, sore throat, trouble swallowing and voice change.    Eyes: Negative for pain and visual disturbance.   Respiratory: Negative for cough, shortness of breath and wheezing.    Cardiovascular: Negative for chest pain and palpitations.   Gastrointestinal: Negative for abdominal pain, blood in stool, constipation and diarrhea.   Endocrine: Negative for cold intolerance and polydipsia.   Genitourinary: Negative for difficulty urinating, flank pain and hematuria.   Musculoskeletal: Positive for arthralgias and back pain. Negative for gait problem, joint swelling and myalgias.   Skin: Positive for color change. Negative for rash.   Allergic/Immunologic: Negative.    Neurological: Negative for syncope, numbness and headaches.   Hematological: Negative.    Psychiatric/Behavioral: Negative for dysphoric mood, self-injury, sleep disturbance and suicidal ideas.   All other systems reviewed and are negative.        This document has been electronically signed by:  CHARMAINE Doran, NP-C    Objective     Vitals:    03/26/19 1039   BP: 110/80   Pulse: 74   Temp: 98.4 °F (36.9 °C)   TempSrc: Tympanic   SpO2:  "99%   Weight: 81.2 kg (179 lb)   Height: 165.1 cm (65\")       Patient's Body mass index is 29.79 kg/m². BMI is above normal parameters. Recommendations include: exercise counseling and nutrition counseling.       Visual Acuity Screening    Right eye Left eye Both eyes   Without correction: 20/20 20/20    With correction:        Hearing adequate per whisper test     The patient has no evidence of cognitve impairment.     Physical Exam   Constitutional: She is oriented to person, place, and time. She appears well-developed. No distress.   HENT:   Head: Normocephalic.   Right Ear: External ear normal.   Left Ear: External ear normal.   Nose: Right sinus exhibits maxillary sinus tenderness and frontal sinus tenderness. Left sinus exhibits maxillary sinus tenderness and frontal sinus tenderness.   Mouth/Throat: Oropharynx is clear and moist. No oropharyngeal exudate.   Eyes: Pupils are equal, round, and reactive to light. Right eye exhibits no discharge. Left eye exhibits no discharge.   Neck: Neck supple. No thyromegaly present.   Cardiovascular: Normal rate, regular rhythm, normal heart sounds and normal pulses.   Pulmonary/Chest: Breath sounds normal. No respiratory distress.   Abdominal: Soft. Bowel sounds are normal. There is no tenderness. There is no guarding.   Musculoskeletal:        Lumbar back: She exhibits decreased range of motion, tenderness and spasm.   Lymphadenopathy:     She has no cervical adenopathy.   Neurological: She is alert and oriented to person, place, and time. No cranial nerve deficit.   Reflex Scores:       Patellar reflexes are 1+ on the right side and 1+ on the left side.  Skin: Skin is warm and dry. Capillary refill takes less than 2 seconds. Lesion noted. She is not diaphoretic. There is erythema. No pallor.        Psychiatric: She has a normal mood and affect. Her behavior is normal. Thought content normal.       Recent Lab Results:  Lab Results   Component Value Date    GLU 81 " 05/17/2018     Lab Results   Component Value Date    CHOL 147 02/27/2017    TRIG 216 (H) 11/28/2017    HDL 43 (L) 11/28/2017    VLDL 43.2 11/28/2017    LDLHDL 0.93 02/27/2017       Assessment/Plan   Age-appropriate Screening Schedule:  Refer to the list below for future screening recommendations based on patient's age, sex and/or medical conditions.      Health Maintenance   Topic Date Due   • DIABETIC FOOT EXAM  09/19/2018   • MAMMOGRAM  09/19/2018   • DIABETIC EYE EXAM  09/19/2018   • URINE MICROALBUMIN  10/02/2018   • HEMOGLOBIN A1C  11/17/2018   • DXA SCAN  01/17/2019   • ZOSTER VACCINE (1 of 2) 02/13/2019   • TDAP/TD VACCINES (1 - Tdap) 06/10/2019 (Originally 2/13/1989)   • PAP SMEAR  01/23/2021   • COLONOSCOPY  11/17/2021   • PNEUMOCOCCAL VACCINE (19-64 MEDIUM RISK)  Completed   • INFLUENZA VACCINE  Completed       Medicare Risks and Personalized Health Plan:  Osteoprorosis risk identified;  Use Calcium and Vitamin D as directed, avoid caffeine, participate in weight bearing exercises for better bone health  Cardiovascular risk;  Patient advised to follow heart healthy diet to help decrease cardiovascular risk  and (additional patient information in the After Visit Summary)     Tobacco Use/Dependance;  I advised Mirian of the risks of continuing to use tobacco, and I provided her with tobacco cessation educational materials in the After Visit Summary.     During this visit, I spent 4 minutes counseling the patient regarding tobacco cessation.    Mammogram Screening is due; Screening Mammogram ordered    Obesity/Overweight condition;  Weight Watchers advised    Inactivity/Poor Exercise Habits-patient reported; Inactivity; Follow Exercise Information (additional patient information in the After Visit Summary)  Low fat diet       CMS-Preventive Services Quick Reference  Medicare Preventive Services Addressed:  Annual Wellness Visit (AWV)  Counseling to Prevent Tobacco Use (use of smartset and @cessation@  harjeet for documentation)  Depression Screening (15 minutes face to face, Code )  Diabetes Self-Management Training (DSMT)   Screening Mammography     Advance Care Planning:  Patient does not have an advance directive - not interested in additional information    Diagnoses and all orders for this visit:    1. DDD (degenerative disc disease), lumbar (Primary)  Assessment & Plan:  Proper body mechanics has been reviewed and discussed today.      As part of this patient's treatment plan they are being prescribed controlled substance/substances with potential for abuse. This patient has been made aware of the appropriate use of such medications, including potential risk of somnolence, limited ability to drive and / or work safely, and potential for overdose. It has also been made clear that these medications are for use by this patient only, without concomitant use of alcohol or other substances unless prescribed/advised by medical provider. Patient has completed controlled substance agreement detailing terms of continued prescribing of controlled substances including monitoring Marques reports, drug screens and pill counts. The patient was asked and states they are not receiving narcotic medication from any there provider or any provider that this office has not been made aware of. History and physical exam exhibit continued safe and appropriate use of controlled substances.   Goal: Improved quality of life and reduction in pain as evidenced by pt report.   Marques #[id number] has been reviewed as consistent.  UDS is on file.     Patient has been instructed and counseled regarding opioid misuse and risk of addiction.  We have discussed proper storage and disposal of controlled medication.  Pt is at low risk for substance abuse or addiction. Pt will be monitored closely for compliance and the need to continue plan of care.     Orders:  -     CBC & Differential  -     Comprehensive Metabolic Panel  -     Lipid  Panel  -     TSH  -     Hemoglobin A1c  -     Vitamin D 25 Hydroxy  -     Vitamin B12  -     Microalbumin / Creatinine Urine Ratio - Urine, Clean Catch  -     HYDROcodone-acetaminophen (NORCO)  MG per tablet; Take 1 tablet by mouth Every 6 (Six) Hours As Needed for Moderate Pain .  Dispense: 90 tablet; Refill: 0  -     Urine Drug Screen - Urine, Clean Catch; Future    2. Other dietary vitamin B12 deficiency anemia  -     CBC & Differential  -     Comprehensive Metabolic Panel  -     Lipid Panel  -     TSH  -     Hemoglobin A1c  -     Vitamin D 25 Hydroxy  -     Vitamin B12  -     Microalbumin / Creatinine Urine Ratio - Urine, Clean Catch    3. Vitamin B 12 deficiency  -     CBC & Differential  -     Comprehensive Metabolic Panel  -     Lipid Panel  -     TSH  -     Hemoglobin A1c  -     Vitamin D 25 Hydroxy  -     Vitamin B12  -     Microalbumin / Creatinine Urine Ratio - Urine, Clean Catch    4. Vitamin D deficiency  -     CBC & Differential  -     Comprehensive Metabolic Panel  -     Lipid Panel  -     TSH  -     Hemoglobin A1c  -     Vitamin D 25 Hydroxy  -     Vitamin B12  -     Microalbumin / Creatinine Urine Ratio - Urine, Clean Catch    5. Tobacco use  -     CBC & Differential  -     Comprehensive Metabolic Panel  -     Lipid Panel  -     TSH  -     Hemoglobin A1c  -     Vitamin D 25 Hydroxy  -     Vitamin B12  -     Microalbumin / Creatinine Urine Ratio - Urine, Clean Catch    6. Controlled type 2 DM with peripheral circulatory disorder (CMS/HCC)  Assessment & Plan:  Diabetes is improving with lifestyle modifications.   Continue current treatment regimen.  Diabetes will be reassessed in 1 month.    Orders:  -     CBC & Differential  -     Comprehensive Metabolic Panel  -     Lipid Panel  -     TSH  -     Hemoglobin A1c  -     Vitamin D 25 Hydroxy  -     Vitamin B12  -     Microalbumin / Creatinine Urine Ratio - Urine, Clean Catch    7. Abscess of anorectal fissure  Assessment & Plan:  Further general  surgeon.  Scheduled tomorrow with Dr. toney.  Start  Bactrim    Orders:  -     sulfamethoxazole-trimethoprim (BACTRIM DS,SEPTRA DS) 800-160 MG per tablet; Take 1 tablet by mouth 2 (Two) Times a Day for 10 days.  Dispense: 20 tablet; Refill: 0  -     Ambulatory Referral to General Surgery    8. Maxillary sinusitis, unspecified chronicity  -     loratadine-pseudoephedrine (ALAVERT ALLERGY/SINUS) 5-120 MG per 12 hr tablet; Take 1 tablet by mouth 2 (Two) Times a Day.  Dispense: 30 tablet; Refill: 3    9. Dysuria  -     sulfamethoxazole-trimethoprim (BACTRIM DS,SEPTRA DS) 800-160 MG per tablet; Take 1 tablet by mouth 2 (Two) Times a Day for 10 days.  Dispense: 20 tablet; Refill: 0  -     POC Urinalysis Dipstick, Automated  -     POC Urinalysis Dipstick    10. Essential hypertension  -     CBC & Differential  -     Microalbumin / Creatinine Urine Ratio - Urine, Clean Catch    11. Hx of laparoscopic gastric banding  Assessment & Plan:  Bariatric precautions reviewed      12. Colovaginal fistula  -     Ambulatory Referral to General Surgery    13. Cubital tunnel syndrome, unspecified laterality  -     CBC & Differential    14. Diverticulitis of large intestine with perforation without bleeding  -     CBC & Differential  -     Comprehensive Metabolic Panel    15. Slow transit constipation  -     CBC & Differential  -     Comprehensive Metabolic Panel    16. Reactive depression  -     CBC & Differential  -     Comprehensive Metabolic Panel  -     TSH    17. Screening for breast cancer  -     Mammo Screening Bilateral With CAD; Future    Medicare wellness exam has been performed today.  Medication list has been reviewed and discussed with patient.  Recommended preventative and screenings has been discussed with patient.    Goal: pt will report improved anxiety symptoms.   Gerd - pt has gerd symptoms which are controlled with current medication. Some exacerbation when eats certain foods. Tries to avoid spicy foods.   Goal:  Improved quality of life and reduction in pain as evidenced by pt report.     I have discussed diagnosis in detail today allowing time for questions and answers. Pt is aware of reasons to seek urgent or emergent medical care as well as reasons to return to the clinic for evaluation. Possible side effects, interactions and progression of symptoms discussed as well. Pt / family states understanding.   Emotional support and active listening provided.      Counseling/anticipatory guidance: Nutrition, family planning/contraception, physical activity, healthy weight, injury prevention, misuse of tobacco, alcohol and drugs, sexual behavior and STDs, dental health, mental health, immunizations and sex/age appropriate screenings.     Labs/ diagnostic: Cholesterol, diabetes, colorectal cancer screening beginning at age 50 years.     Separate visit for acute complaint of abscess.  Start Bactrim.  Refer to general surgery for evaluation.    An After Visit Summary and PPPS with all of these plans were given to the patient.      Follow Up:  Return in about 1 month (around 4/26/2019) for CM 30 min.        Follow up in one month, sooner if needed. Routine labs every 3-6 months.   Dictated utilizing Dragon dictation

## 2019-03-27 ENCOUNTER — OFFICE VISIT (OUTPATIENT)
Dept: SURGERY | Facility: CLINIC | Age: 49
End: 2019-03-27

## 2019-03-27 VITALS
WEIGHT: 179 LBS | SYSTOLIC BLOOD PRESSURE: 109 MMHG | BODY MASS INDEX: 29.82 KG/M2 | DIASTOLIC BLOOD PRESSURE: 75 MMHG | HEIGHT: 65 IN | HEART RATE: 82 BPM

## 2019-03-27 DIAGNOSIS — K61.0 PERIANAL ABSCESS: Primary | ICD-10-CM

## 2019-03-27 PROCEDURE — 99214 OFFICE O/P EST MOD 30 MIN: CPT | Performed by: SURGERY

## 2019-03-27 RX ORDER — CEFAZOLIN SODIUM 2 G/50ML
2 SOLUTION INTRAVENOUS ONCE
Status: CANCELLED | OUTPATIENT
Start: 2019-03-28 | End: 2019-03-27

## 2019-03-28 ENCOUNTER — ANESTHESIA (OUTPATIENT)
Dept: PERIOP | Facility: HOSPITAL | Age: 49
End: 2019-03-28

## 2019-03-28 ENCOUNTER — HOSPITAL ENCOUNTER (OUTPATIENT)
Facility: HOSPITAL | Age: 49
Setting detail: HOSPITAL OUTPATIENT SURGERY
Discharge: HOME | End: 2019-03-28
Attending: SURGERY | Admitting: SURGERY

## 2019-03-28 ENCOUNTER — APPOINTMENT (OUTPATIENT)
Dept: PREADMISSION TESTING | Facility: HOSPITAL | Age: 49
End: 2019-03-28

## 2019-03-28 ENCOUNTER — ANESTHESIA EVENT (OUTPATIENT)
Dept: PERIOP | Facility: HOSPITAL | Age: 49
End: 2019-03-28

## 2019-03-28 VITALS
DIASTOLIC BLOOD PRESSURE: 73 MMHG | HEART RATE: 83 BPM | RESPIRATION RATE: 16 BRPM | TEMPERATURE: 97.8 F | SYSTOLIC BLOOD PRESSURE: 116 MMHG | OXYGEN SATURATION: 99 %

## 2019-03-28 DIAGNOSIS — K61.0 PERIANAL ABSCESS: ICD-10-CM

## 2019-03-28 LAB — GLUCOSE BLDC GLUCOMTR-MCNC: 90 MG/DL (ref 70–130)

## 2019-03-28 PROCEDURE — 25010000002 MIDAZOLAM PER 1 MG: Performed by: NURSE ANESTHETIST, CERTIFIED REGISTERED

## 2019-03-28 PROCEDURE — 10061 I&D ABSCESS COMP/MULTIPLE: CPT | Performed by: SURGERY

## 2019-03-28 PROCEDURE — 25010000002 ONDANSETRON PER 1 MG: Performed by: NURSE ANESTHETIST, CERTIFIED REGISTERED

## 2019-03-28 PROCEDURE — 25010000002 PROPOFOL 10 MG/ML EMULSION: Performed by: NURSE ANESTHETIST, CERTIFIED REGISTERED

## 2019-03-28 PROCEDURE — 25010000002 FENTANYL CITRATE (PF) 100 MCG/2ML SOLUTION: Performed by: NURSE ANESTHETIST, CERTIFIED REGISTERED

## 2019-03-28 PROCEDURE — 25010000003 CEFAZOLIN SODIUM-DEXTROSE 2-3 GM-%(50ML) RECONSTITUTED SOLUTION: Performed by: SURGERY

## 2019-03-28 PROCEDURE — 82962 GLUCOSE BLOOD TEST: CPT

## 2019-03-28 RX ORDER — IPRATROPIUM BROMIDE AND ALBUTEROL SULFATE 2.5; .5 MG/3ML; MG/3ML
3 SOLUTION RESPIRATORY (INHALATION) ONCE AS NEEDED
Status: DISCONTINUED | OUTPATIENT
Start: 2019-03-28 | End: 2019-03-28 | Stop reason: HOSPADM

## 2019-03-28 RX ORDER — ONDANSETRON 2 MG/ML
4 INJECTION INTRAMUSCULAR; INTRAVENOUS ONCE AS NEEDED
Status: DISCONTINUED | OUTPATIENT
Start: 2019-03-28 | End: 2019-03-28 | Stop reason: HOSPADM

## 2019-03-28 RX ORDER — BUPIVACAINE HYDROCHLORIDE AND EPINEPHRINE 2.5; 5 MG/ML; UG/ML
INJECTION, SOLUTION EPIDURAL; INFILTRATION; INTRACAUDAL; PERINEURAL AS NEEDED
Status: DISCONTINUED | OUTPATIENT
Start: 2019-03-28 | End: 2019-03-28 | Stop reason: HOSPADM

## 2019-03-28 RX ORDER — MEPERIDINE HYDROCHLORIDE 25 MG/ML
12.5 INJECTION INTRAMUSCULAR; INTRAVENOUS; SUBCUTANEOUS
Status: DISCONTINUED | OUTPATIENT
Start: 2019-03-28 | End: 2019-03-28 | Stop reason: HOSPADM

## 2019-03-28 RX ORDER — SODIUM CHLORIDE 0.9 % (FLUSH) 0.9 %
3-10 SYRINGE (ML) INJECTION AS NEEDED
Status: DISCONTINUED | OUTPATIENT
Start: 2019-03-28 | End: 2019-03-28 | Stop reason: HOSPADM

## 2019-03-28 RX ORDER — SODIUM CHLORIDE 0.9 % (FLUSH) 0.9 %
3 SYRINGE (ML) INJECTION EVERY 12 HOURS SCHEDULED
Status: DISCONTINUED | OUTPATIENT
Start: 2019-03-28 | End: 2019-03-28 | Stop reason: HOSPADM

## 2019-03-28 RX ORDER — MIDAZOLAM HYDROCHLORIDE 1 MG/ML
2 INJECTION INTRAMUSCULAR; INTRAVENOUS
Status: DISCONTINUED | OUTPATIENT
Start: 2019-03-28 | End: 2019-03-28 | Stop reason: HOSPADM

## 2019-03-28 RX ORDER — SODIUM CHLORIDE, SODIUM LACTATE, POTASSIUM CHLORIDE, CALCIUM CHLORIDE 600; 310; 30; 20 MG/100ML; MG/100ML; MG/100ML; MG/100ML
125 INJECTION, SOLUTION INTRAVENOUS CONTINUOUS
Status: DISCONTINUED | OUTPATIENT
Start: 2019-03-28 | End: 2019-03-28 | Stop reason: HOSPADM

## 2019-03-28 RX ORDER — CEFAZOLIN SODIUM 2 G/50ML
2 SOLUTION INTRAVENOUS ONCE
Status: COMPLETED | OUTPATIENT
Start: 2019-03-28 | End: 2019-03-28

## 2019-03-28 RX ORDER — MIDAZOLAM HYDROCHLORIDE 1 MG/ML
1 INJECTION INTRAMUSCULAR; INTRAVENOUS
Status: DISCONTINUED | OUTPATIENT
Start: 2019-03-28 | End: 2019-03-28 | Stop reason: HOSPADM

## 2019-03-28 RX ORDER — OXYCODONE HYDROCHLORIDE AND ACETAMINOPHEN 5; 325 MG/1; MG/1
1 TABLET ORAL ONCE AS NEEDED
Status: DISCONTINUED | OUTPATIENT
Start: 2019-03-28 | End: 2019-03-28 | Stop reason: HOSPADM

## 2019-03-28 RX ORDER — FENTANYL CITRATE 50 UG/ML
INJECTION, SOLUTION INTRAMUSCULAR; INTRAVENOUS AS NEEDED
Status: DISCONTINUED | OUTPATIENT
Start: 2019-03-28 | End: 2019-03-28 | Stop reason: SURG

## 2019-03-28 RX ORDER — MIDAZOLAM HYDROCHLORIDE 1 MG/ML
INJECTION INTRAMUSCULAR; INTRAVENOUS AS NEEDED
Status: DISCONTINUED | OUTPATIENT
Start: 2019-03-28 | End: 2019-03-28 | Stop reason: SURG

## 2019-03-28 RX ORDER — MAGNESIUM HYDROXIDE 1200 MG/15ML
LIQUID ORAL AS NEEDED
Status: DISCONTINUED | OUTPATIENT
Start: 2019-03-28 | End: 2019-03-28 | Stop reason: HOSPADM

## 2019-03-28 RX ORDER — PROPOFOL 10 MG/ML
VIAL (ML) INTRAVENOUS AS NEEDED
Status: DISCONTINUED | OUTPATIENT
Start: 2019-03-28 | End: 2019-03-28 | Stop reason: SURG

## 2019-03-28 RX ORDER — HYDROCODONE BITARTRATE AND ACETAMINOPHEN 5; 325 MG/1; MG/1
1 TABLET ORAL EVERY 4 HOURS PRN
Qty: 5 TABLET | Refills: 0 | Status: SHIPPED | OUTPATIENT
Start: 2019-03-28 | End: 2019-04-24

## 2019-03-28 RX ORDER — FAMOTIDINE 10 MG/ML
INJECTION, SOLUTION INTRAVENOUS AS NEEDED
Status: DISCONTINUED | OUTPATIENT
Start: 2019-03-28 | End: 2019-03-28 | Stop reason: SURG

## 2019-03-28 RX ORDER — ONDANSETRON 2 MG/ML
INJECTION INTRAMUSCULAR; INTRAVENOUS AS NEEDED
Status: DISCONTINUED | OUTPATIENT
Start: 2019-03-28 | End: 2019-03-28 | Stop reason: SURG

## 2019-03-28 RX ORDER — FENTANYL CITRATE 50 UG/ML
50 INJECTION, SOLUTION INTRAMUSCULAR; INTRAVENOUS
Status: DISCONTINUED | OUTPATIENT
Start: 2019-03-28 | End: 2019-03-28 | Stop reason: HOSPADM

## 2019-03-28 RX ADMIN — FAMOTIDINE 20 MG: 10 INJECTION, SOLUTION INTRAVENOUS at 12:06

## 2019-03-28 RX ADMIN — PROPOFOL 140 MG: 10 INJECTION, EMULSION INTRAVENOUS at 12:04

## 2019-03-28 RX ADMIN — FENTANYL CITRATE 50 MCG: 50 INJECTION INTRAMUSCULAR; INTRAVENOUS at 12:19

## 2019-03-28 RX ADMIN — SODIUM CHLORIDE, POTASSIUM CHLORIDE, SODIUM LACTATE AND CALCIUM CHLORIDE 125 ML/HR: 600; 310; 30; 20 INJECTION, SOLUTION INTRAVENOUS at 11:20

## 2019-03-28 RX ADMIN — MIDAZOLAM HYDROCHLORIDE 2 MG: 1 INJECTION, SOLUTION INTRAMUSCULAR; INTRAVENOUS at 11:59

## 2019-03-28 RX ADMIN — FENTANYL CITRATE 50 MCG: 50 INJECTION INTRAMUSCULAR; INTRAVENOUS at 12:10

## 2019-03-28 RX ADMIN — FENTANYL CITRATE 100 MCG: 50 INJECTION INTRAMUSCULAR; INTRAVENOUS at 11:59

## 2019-03-28 RX ADMIN — CEFAZOLIN SODIUM 2 G: 2 SOLUTION INTRAVENOUS at 12:06

## 2019-03-28 RX ADMIN — ONDANSETRON 4 MG: 2 INJECTION, SOLUTION INTRAMUSCULAR; INTRAVENOUS at 12:06

## 2019-03-28 NOTE — PROGRESS NOTES
Subjective   Mirian Fischer is a 49 y.o. female is being seen for consultation today at the request of Jaleesa Contreras APRN    Mirian Fischer is a 49 y.o. female With history of diverticulitis with perforation and abscess requiring sigmoid colectomy and abscess drainage.  The patient now presents with right perianal abscess that could represent fistula as well.  This is chronically draining at home but the patient has an area of fluctuance and redness measuring 2.5-3 cm in diameter.  No fever or systemic symptoms.  Digital rectal examination shows no internal opening or purulent drainage at this time.        Past Medical History:   Diagnosis Date   • Allergic rhinitis    • Anemia    • Anxiety    • Anxiety disorder    • Cellulitis and abscess     labia and groin   • Conjunctival folliculosis    • Constipation    • Controlled type 2 DM with peripheral circulatory disorder (CMS/HCC)    • Degenerative disc disease at L5-S1 level    • Depression    • Dermatitis due to drug    • Diverticulitis    • Elevated cholesterol    • Fistula    • Flushing    • GERD (gastroesophageal reflux disease)    • Herniated cervical disc    • Hypertension    • Hypertension    • Impacted cerumen    • Lumbago    • Malaise and fatigue    • Nausea    • Osteoarthritis, multiple sites    • Sleep apnea    • Sleep apnea    • Swelling, limb    • Tobacco use    • Vitamin D deficiency        Family History   Problem Relation Age of Onset   • Arthritis Mother    • Asthma Mother    • Cancer Mother    • COPD Mother    • Depression Mother    • Diabetes Mother    • Heart disease Mother    • Hyperlipidemia Mother    • Hypertension Mother    • Arthritis Father    • Asthma Father    • COPD Father    • Diabetes Father    • Hyperlipidemia Father    • Hypertension Father    • Arthritis Sister    • Diabetes Sister    • Hyperlipidemia Sister    • Hypertension Sister    • Arthritis Brother    • Diabetes Brother    • Hyperlipidemia Brother    •  Hypertension Brother    • Diabetes Maternal Grandmother    • Hyperlipidemia Maternal Grandmother    • Hypertension Maternal Grandmother    • Diabetes Maternal Grandfather    • Hyperlipidemia Maternal Grandfather    • Hypertension Maternal Grandfather    • Diabetes Paternal Grandmother    • Hyperlipidemia Paternal Grandmother    • Hypertension Paternal Grandmother    • Diabetes Paternal Grandfather    • Hyperlipidemia Paternal Grandfather    • Hypertension Paternal Grandfather        Social History     Socioeconomic History   • Marital status: Single     Spouse name: Not on file   • Number of children: Not on file   • Years of education: Not on file   • Highest education level: Not on file   Tobacco Use   • Smoking status: Current Every Day Smoker     Packs/day: 1.50     Years: 30.00     Pack years: 45.00     Types: Cigarettes   • Smokeless tobacco: Never Used   Substance and Sexual Activity   • Alcohol use: No   • Drug use: No   • Sexual activity: No       Past Surgical History:   Procedure Laterality Date   •  SECTION     • CHOLECYSTECTOMY     • COLON RESECTION     • COLONOSCOPY N/A 2016    Procedure: COLONOSCOPY;  Surgeon: Bakari Pruitt MD;  Location: [facility];  Service:    • ENDOSCOPY     • FINGER REIMPLANTATION     • FRACTURE SURGERY      right hand   • LAPAROSCOPIC GASTRIC BANDING     • TYMPANOPLASTY     • WISDOM TOOTH EXTRACTION         Review of Systems   Constitutional: Negative for activity change, appetite change, chills and fever.   HENT: Negative for sore throat and trouble swallowing.    Eyes: Negative for visual disturbance.   Respiratory: Negative for cough and shortness of breath.    Cardiovascular: Negative for chest pain and palpitations.   Gastrointestinal: Negative for abdominal distention, abdominal pain, blood in stool, constipation, diarrhea, nausea and vomiting.   Endocrine: Negative for cold intolerance and heat intolerance.   Genitourinary: Negative for dysuria.  "  Musculoskeletal: Negative for joint swelling.   Skin: Positive for wound. Negative for color change and rash.   Allergic/Immunologic: Negative for immunocompromised state.   Neurological: Negative for dizziness, seizures, weakness and headaches.   Hematological: Negative for adenopathy. Does not bruise/bleed easily.   Psychiatric/Behavioral: Negative for agitation and confusion.         /75   Pulse 82   Ht 165.1 cm (65\")   Wt 81.2 kg (179 lb)   LMP  (LMP Unknown)   BMI 29.79 kg/m²   Objective   Physical Exam   Constitutional: She is oriented to person, place, and time. She appears well-developed.   HENT:   Head: Normocephalic and atraumatic.   Mouth/Throat: Mucous membranes are normal.   Eyes: Conjunctivae are normal. Pupils are equal, round, and reactive to light.   Neck: Neck supple. No JVD present. No tracheal deviation present. No thyromegaly present.   Cardiovascular: Normal rate and regular rhythm. Exam reveals no gallop and no friction rub.   No murmur heard.  Pulmonary/Chest: Effort normal and breath sounds normal.   Abdominal: Soft. She exhibits no distension. There is no splenomegaly or hepatomegaly. There is no tenderness. No hernia.   Genitourinary:   Genitourinary Comments: Right perianal abscess 3 cm in diameter with central fluctuance and cellulitis   Musculoskeletal: Normal range of motion. She exhibits no deformity.   Neurological: She is alert and oriented to person, place, and time.   Skin: Skin is warm and dry.   Psychiatric: She has a normal mood and affect.             Assessment   Mirian was seen today for abscess of anorectal fissure.    Diagnoses and all orders for this visit:    Perianal abscess  -     Case Request; Standing  -     Cancel: ceFAZolin Sodium-Dextrose (ANCEF) IVPB (duplex) 2 g  -     Case Request    Other orders  -     Follow Anesthesia Guidelines / Standing Orders; Future  -     Provide NPO Instructions to Patient; Future  -     Clorhexidine skin prep  -     " Cancel: Follow Anesthesia Guidelines / Standing Orders; Standing  -     Cancel: Verify NPO Status; Standing  -     Cancel: Obtain informed consent; Standing  -     Cancel: Verify / Perform Chlorhexidine Skin Prep if Indicated (If Not Already Completed); Standing      Mirian Fischer is a 49 y.o. female with right perianal abscess with cellulitis could represent fistula in anal.  The patient understands risks and benefits of surgery and will undergo drainage in the office with possible fistulotomy or seton placement.    Patient's Body mass index is 29.79 kg/m². BMI is above normal parameters. Recommendations include: educational material.    I advised Mirian of the risks of continuing to use tobacco, and I provided her with tobacco cessation educational materials in the After Visit Summary.     During this visit, I spent 3 minutes counseling the patient regarding tobacco cessation.

## 2019-03-28 NOTE — ANESTHESIA POSTPROCEDURE EVALUATION
Patient: Mirian Fischer    Procedure Summary     Date:  03/28/19 Room / Location:   [facility] 02 /  [facility]    Anesthesia Start:  1159 Anesthesia Stop:  1224    Procedure:  INCISION AND DRAINAGE ABSCESS (Right Abdomen) Diagnosis:       Perianal abscess      (Perianal abscess [K61.0])    Surgeon:  Bakari Pruitt MD Provider:  Vince Jean MD    Anesthesia Type:  general ASA Status:  3          Anesthesia Type: general  Last vitals  BP   120/74 (03/28/19 1304)   Temp   97.7 °F (36.5 °C) (03/28/19 1304)   Pulse   85 (03/28/19 1304)   Resp   16 (03/28/19 1304)     SpO2   97 % (03/28/19 1304)     Post Anesthesia Care and Evaluation    Patient location during evaluation: PHASE II  Patient participation: complete - patient participated  Level of consciousness: awake and alert  Pain score: 0  Pain management: adequate  Airway patency: patent  Anesthetic complications: No anesthetic complications    Cardiovascular status: acceptable  Respiratory status: acceptable  Hydration status: acceptable

## 2019-03-28 NOTE — ANESTHESIA PROCEDURE NOTES
Airway  Urgency: elective    Airway not difficult    General Information and Staff    Patient location during procedure: OR  CRNA: Eve Romero CRNA    Indications and Patient Condition  Indications for airway management: airway protection    Preoxygenated: yes  MILS maintained throughout  Mask difficulty assessment: 0 - not attempted    Final Airway Details  Final airway type: supraglottic airway      Successful airway: unique  Size 4    Number of attempts at approach: 1

## 2019-03-28 NOTE — ANESTHESIA PREPROCEDURE EVALUATION
Anesthesia Evaluation     Patient summary reviewed and Nursing notes reviewed   no history of anesthetic complications:  NPO Solid Status: > 8 hours  NPO Liquid Status: > 8 hours           Airway   Mallampati: II  TM distance: <3 FB  Neck ROM: full  possible difficult intubation and anterior  Dental - normal exam     Pulmonary - normal exam   (+) a smoker Current, COPD, sleep apnea on CPAP,   (-) asthma  Cardiovascular - normal exam  Exercise tolerance: good (4-7 METS)    NYHA Classification: II  Patient on routine beta blocker and Beta blocker given within 24 hours of surgery    (+) hypertension, dysrhythmias, PVD, hyperlipidemia,   (-) past MI, angina, CHF      Neuro/Psych  (+) numbness, psychiatric history Depression,     (-) seizures, CVA  GI/Hepatic/Renal/Endo    (+) obesity,  GERD,  diabetes mellitus,   (-) morbid obesity    Musculoskeletal     Abdominal  - normal exam    Bowel sounds: normal.   Substance History - negative use     OB/GYN negative ob/gyn ROS         Other   (+) arthritis                       Anesthesia Plan    ASA 3     general     intravenous induction   Anesthetic plan, all risks, benefits, and alternatives have been provided, discussed and informed consent has been obtained with: patient.  Use of blood products discussed with patient  Consented to blood products.

## 2019-04-24 ENCOUNTER — OFFICE VISIT (OUTPATIENT)
Dept: FAMILY MEDICINE CLINIC | Facility: CLINIC | Age: 49
End: 2019-04-24

## 2019-04-24 ENCOUNTER — TELEPHONE (OUTPATIENT)
Dept: FAMILY MEDICINE CLINIC | Facility: CLINIC | Age: 49
End: 2019-04-24

## 2019-04-24 VITALS
TEMPERATURE: 97.8 F | HEART RATE: 84 BPM | DIASTOLIC BLOOD PRESSURE: 80 MMHG | OXYGEN SATURATION: 99 % | HEIGHT: 65 IN | SYSTOLIC BLOOD PRESSURE: 120 MMHG | WEIGHT: 179 LBS | BODY MASS INDEX: 29.82 KG/M2

## 2019-04-24 DIAGNOSIS — M51.36 DDD (DEGENERATIVE DISC DISEASE), LUMBAR: ICD-10-CM

## 2019-04-24 DIAGNOSIS — K61.2: Primary | ICD-10-CM

## 2019-04-24 DIAGNOSIS — M48.062 SPINAL STENOSIS, LUMBAR REGION, WITH NEUROGENIC CLAUDICATION: ICD-10-CM

## 2019-04-24 DIAGNOSIS — D51.3 OTHER DIETARY VITAMIN B12 DEFICIENCY ANEMIA: Primary | ICD-10-CM

## 2019-04-24 DIAGNOSIS — N82.4 COLOVAGINAL FISTULA: ICD-10-CM

## 2019-04-24 PROBLEM — Z01.419 NORMAL GYNECOLOGIC EXAMINATION: Status: RESOLVED | Noted: 2018-01-23 | Resolved: 2019-04-24

## 2019-04-24 PROCEDURE — 96372 THER/PROPH/DIAG INJ SC/IM: CPT | Performed by: NURSE PRACTITIONER

## 2019-04-24 PROCEDURE — 99214 OFFICE O/P EST MOD 30 MIN: CPT | Performed by: NURSE PRACTITIONER

## 2019-04-24 RX ORDER — HYDROCODONE BITARTRATE AND ACETAMINOPHEN 10; 325 MG/1; MG/1
1 TABLET ORAL EVERY 6 HOURS PRN
Qty: 90 TABLET | Refills: 0 | Status: SHIPPED | OUTPATIENT
Start: 2019-04-24 | End: 2019-05-22 | Stop reason: SDUPTHER

## 2019-04-24 RX ORDER — GABAPENTIN 600 MG/1
600 TABLET ORAL 3 TIMES DAILY
Qty: 90 TABLET | Refills: 2 | Status: SHIPPED | OUTPATIENT
Start: 2019-04-24 | End: 2019-06-19 | Stop reason: SDUPTHER

## 2019-04-24 RX ADMIN — CYANOCOBALAMIN 1000 MCG: 1000 INJECTION, SOLUTION INTRAMUSCULAR; SUBCUTANEOUS at 12:26

## 2019-04-24 NOTE — ASSESSMENT & PLAN NOTE
Proper body mechanics has been reviewed and discussed today.      As part of this patient's treatment plan they are being prescribed controlled substance/substances with potential for abuse. This patient has been made aware of the appropriate use of such medications, including potential risk of somnolence, limited ability to drive and / or work safely, and potential for overdose. It has also been made clear that these medications are for use by this patient only, without concomitant use of alcohol or other substances unless prescribed/advised by medical provider. Patient has completed controlled substance agreement detailing terms of continued prescribing of controlled substances including monitoring Marques reports, drug screens and pill counts. The patient was asked and states they are not receiving narcotic medication from any there provider or any provider that this office has not been made aware of. History and physical exam exhibit continued safe and appropriate use of controlled substances.   Goal: Improved quality of life and reduction in pain as evidenced by pt report.   Marques #[id number] has been reviewed as consistent.  UDS is on file.     Patient has been instructed and counseled regarding opioid misuse and risk of addiction.  We have discussed proper storage and disposal of controlled medication.  Pt is at low risk for substance abuse or addiction. Pt will be monitored closely for compliance and the need to continue plan of care.

## 2019-04-24 NOTE — TELEPHONE ENCOUNTER
----- Message from CHARMAINE Dejesus sent at 4/24/2019 12:20 PM EDT -----  Get follow up with dr toney please

## 2019-04-24 NOTE — ASSESSMENT & PLAN NOTE
Proper body mechanics has been reviewed and discussed today.      Encouraged patient to stop smoking so she can have back surgery.

## 2019-04-24 NOTE — PROGRESS NOTES
Subjective   Mirian Fischer is a 49 y.o. female.     Chief Complaint   Patient presents with   • Diabetes     Degenerative disc disease  B12 deficiency    History of Present Illness:    Type 2 diabetes-controlled with weight loss and dietary changes.  Denies any hyperglycemic or hypoglycemic episodes.    B12 deficiency anemia-stable with monthly B12 injections.    Lumbar bulging/degenerative disc disease with herniated nucleus pulposis of lumbar-patient has been under the care of neurology.  In the past she has had some spinal injections which were mildly helpful.  Patient has chronic pain which she rates 8 on pain scale rating of 0-10.  Patient has no history of substance abuse or addiction.  She does currently receiving Neurontin and Norco.  Neurontin is helpful with her radiculopathy.  She reports that Norco is helpful to decrease her symptoms and improve her quality of life.  Patient describes her pain as aching, throbbing and sharp with radiation for her low back into both lower extremities.  At times her lower extremities are numb.  It has been recommended that she have spinal surgery which patient declines as she does not have anyone to care for her handicapped child     She has been seen by Dr. Anatoly Teran neurosurgeon and spinal fusion is recommended.  Patient must quit smoking prior to surgery due to risk of complications and smokers.  She continues to smoke at this time.      She has a an acquired listhesis at L4-L5 with high-grade spinal stenosis.  She has a disc protrusion L5-S1 which is central.  It is actually slightly smaller now than it was in 2016.  At L3-4 she has mild spinal stenosis.    Patient has been seen by Dr. toney since her last visit in this office for abscess of an anal rectal fissure with repair performed.  She reports that all symptoms have resolved.      The following portions of the patient's history were reviewed and updated as appropriate:  Allergies, current medications,  "past family history, past medical history, past social history, past surgical history and problem list.    Review of Systems   Constitutional: Negative for appetite change, fatigue and unexpected weight change.   HENT: Negative for congestion, ear pain, nosebleeds, postnasal drip, rhinorrhea, sore throat, trouble swallowing and voice change.    Eyes: Negative for pain and visual disturbance.   Respiratory: Negative for cough, shortness of breath and wheezing.    Cardiovascular: Negative for chest pain and palpitations.   Gastrointestinal: Negative for abdominal pain, blood in stool, constipation, diarrhea and vomiting.   Endocrine: Negative for cold intolerance and polydipsia.   Genitourinary: Positive for urgency (not of new onset ). Negative for difficulty urinating, flank pain and hematuria.   Musculoskeletal: Positive for arthralgias and back pain. Negative for gait problem, joint swelling and myalgias.   Skin: Negative for color change and rash.   Allergic/Immunologic: Negative.    Neurological: Positive for numbness. Negative for dizziness, syncope, facial asymmetry, speech difficulty, weakness and headaches.   Hematological: Negative.    Psychiatric/Behavioral: Negative for dysphoric mood, self-injury, sleep disturbance and suicidal ideas.   All other systems reviewed and are negative.      Objective     /80   Pulse 84   Temp 97.8 °F (36.6 °C) (Tympanic)   Ht 165.1 cm (65\")   Wt 81.2 kg (179 lb)   LMP  (LMP Unknown)   SpO2 99%   BMI 29.79 kg/m²   Admission on 03/28/2019, Discharged on 03/28/2019   Component Date Value Ref Range Status   • Glucose 03/28/2019 90  70 - 130 mg/dL Final       Physical Exam   Constitutional: She is oriented to person, place, and time. Vital signs are normal. She appears well-developed and well-nourished. No distress.   Pleasant and appropriate 49 year old female.    HENT:   Head: Normocephalic.   Right Ear: Hearing, external ear and ear canal normal. No drainage. " Tympanic membrane is not bulging.   Left Ear: Hearing, external ear and ear canal normal. No drainage. Tympanic membrane is not bulging.   Nose: No mucosal edema. Right sinus exhibits no maxillary sinus tenderness and no frontal sinus tenderness. Left sinus exhibits no maxillary sinus tenderness and no frontal sinus tenderness.   Mouth/Throat: Oropharynx is clear and moist. No oropharyngeal exudate or posterior oropharyngeal erythema.   Eyes: Conjunctivae are normal. Pupils are equal, round, and reactive to light. Right eye exhibits no discharge. Left eye exhibits no discharge.   Neck: Normal range of motion. Neck supple. No tracheal deviation present. No thyromegaly present.   Cardiovascular: Normal rate, regular rhythm and normal heart sounds. Exam reveals no gallop and no friction rub.   No murmur heard.  Pulmonary/Chest: Effort normal and breath sounds normal. No respiratory distress. She has no wheezes. She has no rales. She exhibits no tenderness.   Abdominal: Soft. Bowel sounds are normal. She exhibits no distension and no mass. There is no tenderness. There is no rebound and no guarding.   Musculoskeletal:        Lumbar back: She exhibits decreased range of motion, tenderness, pain and spasm.        Right foot: There is no swelling.        Left foot: There is no swelling.   Neurological: She is alert and oriented to person, place, and time. She is not disoriented. A sensory deficit (Decreased soft and sharp sensation bilateral lower extremities) is present. No cranial nerve deficit. She exhibits abnormal muscle tone.   Reflex Scores:       Patellar reflexes are 1+ on the right side and 2+ on the left side.  Skin: Skin is warm and dry. Capillary refill takes less than 2 seconds. No rash noted. She is not diaphoretic. No erythema.   Psychiatric: She has a normal mood and affect. Her speech is normal and behavior is normal. Judgment and thought content normal. Cognition and memory are normal.   Vitals  reviewed.      Assessment/Plan     Problem List Items Addressed This Visit        Digestive    Anemia due to vitamin B12 deficiency - Primary    Current Assessment & Plan     Monthly b 12 injections             Nervous and Auditory    Spinal stenosis, lumbar region, with neurogenic claudication    Overview     Surgery has been recommended         Current Assessment & Plan     Proper body mechanics has been reviewed and discussed today.      As part of this patient's treatment plan they are being prescribed controlled substance/substances with potential for abuse. This patient has been made aware of the appropriate use of such medications, including potential risk of somnolence, limited ability to drive and / or work safely, and potential for overdose. It has also been made clear that these medications are for use by this patient only, without concomitant use of alcohol or other substances unless prescribed/advised by medical provider. Patient has completed controlled substance agreement detailing terms of continued prescribing of controlled substances including monitoring Marques reports, drug screens and pill counts. The patient was asked and states they are not receiving narcotic medication from any there provider or any provider that this office has not been made aware of. History and physical exam exhibit continued safe and appropriate use of controlled substances.   Goal: Improved quality of life and reduction in pain as evidenced by pt report.   Marques #[id number] has been reviewed as consistent.  UDS is on file.     Patient has been instructed and counseled regarding opioid misuse and risk of addiction.  We have discussed proper storage and disposal of controlled medication.  Pt is at low risk for substance abuse or addiction. Pt will be monitored closely for compliance and the need to continue plan of care.          Relevant Medications    gabapentin (NEURONTIN) 600 MG tablet       Musculoskeletal and  Integument    DDD (degenerative disc disease), lumbar    Overview     1/4/2019 MRI Lumbar Spine findings  Severe multilevel degenerative disc disease in combination with facet  arthropathy and congenital shortening of the pedicles resulting in  generalized central canal stenosis detailed above.  2. Superimposed disc herniations at L1/2 and L5/S1 contributing to  central canal stenosis and producing mass effect on the central nerve  roots.  3. Severe right neural foraminal stenosis L5/S1 with mass effect on  exiting L5 nerve root laterally.  4. Degenerative type spondylolisthesis anteriorly 8 mm of L4 on L5.  5. Hypertrophic facet arthropathy with periarticular inflammation of  L3/4 and L4/5.            Current Assessment & Plan     Proper body mechanics has been reviewed and discussed today.      Encouraged patient to stop smoking so she can have back surgery.         Relevant Medications    HYDROcodone-acetaminophen (NORCO)  MG per tablet                   Patient's Body mass index is 29.79 kg/m². BMI is above normal parameters. Recommendations include: exercise counseling and nutrition counseling.    I advised Mirian of the risks of continuing to use tobacco, and I provided her with tobacco cessation educational materials in the After Visit Summary.     During this visit, I spent 3 minutes counseling the patient regarding tobacco cessation.      I have discussed diagnosis in detail today allowing time for questions and answers. Patient is aware of reasons to seek urgent or emergent medical care as well as reasons to return to the clinic for evaluation. Possible side effects, interactions and progression of symptoms discussed as well. Patient / family states understanding.   Emotional support and active listening provided.     Follow up in one month, sooner if needed. Routine labs every 3-6 months.     Dictated utilizing Dragon dictation          This document has been electronically signed by:  Jaleesa  CHARMAINE Contreras, NP-C

## 2019-05-22 ENCOUNTER — OFFICE VISIT (OUTPATIENT)
Dept: FAMILY MEDICINE CLINIC | Facility: CLINIC | Age: 49
End: 2019-05-22

## 2019-05-22 VITALS
WEIGHT: 179 LBS | TEMPERATURE: 97.4 F | OXYGEN SATURATION: 96 % | BODY MASS INDEX: 29.82 KG/M2 | HEART RATE: 71 BPM | DIASTOLIC BLOOD PRESSURE: 70 MMHG | HEIGHT: 65 IN | SYSTOLIC BLOOD PRESSURE: 130 MMHG

## 2019-05-22 DIAGNOSIS — M51.36 DDD (DEGENERATIVE DISC DISEASE), LUMBAR: ICD-10-CM

## 2019-05-22 DIAGNOSIS — E53.8 VITAMIN B 12 DEFICIENCY: ICD-10-CM

## 2019-05-22 DIAGNOSIS — M51.36 BULGE OF LUMBAR DISC WITHOUT MYELOPATHY: ICD-10-CM

## 2019-05-22 DIAGNOSIS — Z30.42 ENCOUNTER FOR SURVEILLANCE OF INJECTABLE CONTRACEPTIVE: Primary | ICD-10-CM

## 2019-05-22 DIAGNOSIS — G89.4 CHRONIC PAIN SYNDROME: ICD-10-CM

## 2019-05-22 PROCEDURE — 96372 THER/PROPH/DIAG INJ SC/IM: CPT | Performed by: NURSE PRACTITIONER

## 2019-05-22 PROCEDURE — 99214 OFFICE O/P EST MOD 30 MIN: CPT | Performed by: NURSE PRACTITIONER

## 2019-05-22 PROCEDURE — 81025 URINE PREGNANCY TEST: CPT | Performed by: NURSE PRACTITIONER

## 2019-05-22 RX ORDER — MEDROXYPROGESTERONE ACETATE 150 MG/ML
150 INJECTION, SUSPENSION INTRAMUSCULAR ONCE
Status: COMPLETED | OUTPATIENT
Start: 2019-05-22 | End: 2019-05-22

## 2019-05-22 RX ORDER — HYDROCODONE BITARTRATE AND ACETAMINOPHEN 10; 325 MG/1; MG/1
1 TABLET ORAL EVERY 6 HOURS PRN
Qty: 90 TABLET | Refills: 0 | Status: SHIPPED | OUTPATIENT
Start: 2019-05-22 | End: 2019-06-19 | Stop reason: SDUPTHER

## 2019-05-22 RX ADMIN — CYANOCOBALAMIN 1000 MCG: 1000 INJECTION, SOLUTION INTRAMUSCULAR; SUBCUTANEOUS at 11:39

## 2019-05-22 RX ADMIN — MEDROXYPROGESTERONE ACETATE 150 MG: 150 INJECTION, SUSPENSION INTRAMUSCULAR at 11:43

## 2019-05-22 NOTE — PROGRESS NOTES
Subjective   Mirian Fischer is a 49 y.o. female.     Chief Complaint   Patient presents with   • Diabetes   • Hypertension       History of Present Illness:    B12 deficiency-stable with monthly injections    Type 2 diabetes-stable with weight loss and diet changes    Contraceptive management- need for Depo-Provera injection today.  Not currently sexually active.  Heavy periods are regulated with Depo-Provera.    Patient attempted fasting labs today.  After several venipuncture attempts staff are unable to obtain enough sample to send for her routine labs.    Chronic pain syndrome-degenerative disc changes in the lumbar spine with radiology showing a grade 1 spondylolisthesis at L4-5 felt to be secondary to facet joint arthritis.  There is narrowing of the lateral recesses at L3-4 and L4-5.  Spinal stenosis is most severe at L4-5.  There is a large disc herniation at L5-S1.  She has multilevel degenerative disc disease in combination with facet arthropathy and congenital shortening of the pedicles resulting in a generalized central canal stenosis.  She has been for multiple neurology consults.  Surgery is recommended however is not a option as she is the sole provider of care for her handicapped child.  She rates her pain as 5 on a pain scale rating of 0-10.  Pain is described as sharp, catching and constant.  Pain is made worse with activity such as lifting, bending, twisting and the activities of daily living.  Patient reports that at times she has difficulty doing her daily routine due to severe pain.  She currently receives Neurontin and Norco.  She states that these medications do help decrease her pain and improve her quality of life though they do not control her pain.  We have discussed pain clinic consult in the past.  Patient does report having difficulty with transportation and childcare for out of town medical appointments.  She is willing to consider a pain clinic consult.  She has a family member  "that is to a pain clinic in [place] and if a referral is placed she would like to see Dr. Chadd Chand so she could ride with her family member.  Patient has been compliant with her urine drug screen and Marques.  She has no history of substance abuse or addiction.        The following portions of the patient's history were reviewed and updated as appropriate:  Allergies, current medications, past family history, past medical history, past social history, past surgical history and problem list.    Review of Systems   Constitutional: Negative for activity change, appetite change, fatigue and unexpected weight change.   HENT: Positive for sinus pressure. Negative for congestion, ear pain, nosebleeds, postnasal drip, rhinorrhea, sore throat, trouble swallowing and voice change.    Eyes: Negative for pain and visual disturbance.   Respiratory: Negative for cough, chest tightness, shortness of breath and wheezing.         Smoker    Cardiovascular: Negative for chest pain and palpitations.   Gastrointestinal: Positive for constipation (chronic , taking meds which helps ). Negative for abdominal pain, blood in stool and diarrhea.   Endocrine: Negative for cold intolerance and polydipsia.   Genitourinary: Negative for difficulty urinating, flank pain and hematuria.   Musculoskeletal: Positive for arthralgias and back pain. Negative for gait problem, joint swelling and myalgias.   Skin: Negative for color change and rash.   Allergic/Immunologic: Negative.    Neurological: Negative for syncope, numbness and headaches.   Hematological: Negative.    Psychiatric/Behavioral: Negative for dysphoric mood, self-injury, sleep disturbance and suicidal ideas. The patient is not nervous/anxious.    All other systems reviewed and are negative.      Objective     /70   Pulse 71   Temp 97.4 °F (36.3 °C) (Tympanic)   Ht 165.1 cm (65\")   Wt 81.2 kg (179 lb)   SpO2 96%   BMI 29.79 kg/m²   Admission on 03/28/2019, Discharged on " 03/28/2019   Component Date Value Ref Range Status   • Glucose 03/28/2019 90  70 - 130 mg/dL Final       Physical Exam   Constitutional: She is oriented to person, place, and time. Vital signs are normal. She appears well-developed and well-nourished. No distress.   Pleasant and appropriate 49 year old female.  Visibly tearful when discussing pain clinic referral.   HENT:   Head: Normocephalic.   Right Ear: Hearing, external ear and ear canal normal. No drainage. Tympanic membrane is not bulging.   Left Ear: Hearing, external ear and ear canal normal. No drainage. Tympanic membrane is not bulging.   Nose: No mucosal edema. Right sinus exhibits no maxillary sinus tenderness and no frontal sinus tenderness. Left sinus exhibits no maxillary sinus tenderness and no frontal sinus tenderness.   Mouth/Throat: Oropharynx is clear and moist. No oropharyngeal exudate or posterior oropharyngeal erythema.   Eyes: Conjunctivae are normal. Pupils are equal, round, and reactive to light. Right eye exhibits no discharge. Left eye exhibits no discharge.   Neck: Normal range of motion. Neck supple. No tracheal deviation present. No thyromegaly present.   Cardiovascular: Normal rate, regular rhythm and normal heart sounds. Exam reveals no gallop and no friction rub.   No murmur heard.  Pulmonary/Chest: Effort normal and breath sounds normal. No respiratory distress. She has no wheezes. She has no rales. She exhibits no tenderness.   Abdominal: Soft. Bowel sounds are normal. She exhibits no distension and no mass. There is no tenderness. There is no rebound and no guarding.   Musculoskeletal:        Lumbar back: She exhibits decreased range of motion, tenderness, pain and spasm.        Right foot: There is no swelling.        Left foot: There is no swelling.   Neurological: She is alert and oriented to person, place, and time. She is not disoriented. A sensory deficit (Decreased soft and sharp sensation bilateral lower extremities) is  present. No cranial nerve deficit. She exhibits abnormal muscle tone.   Reflex Scores:       Patellar reflexes are 1+ on the right side and 2+ on the left side.  Skin: Skin is warm and dry. Capillary refill takes less than 2 seconds. No rash noted. She is not diaphoretic. No erythema.   Psychiatric: She has a normal mood and affect. Her speech is normal and behavior is normal. Judgment and thought content normal. Her affect is not inappropriate. Cognition and memory are normal.   Vitals reviewed.      Assessment/Plan     Problem List Items Addressed This Visit        Digestive    Vitamin B 12 deficiency    Overview     Monthly b-12 injections            Nervous and Auditory    Chronic pain syndrome    Current Assessment & Plan     Proper body mechanics has been reviewed and discussed today.      As part of this patient's treatment plan they are being prescribed controlled substance/substances with potential for abuse. This patient has been made aware of the appropriate use of such medications, including potential risk of somnolence, limited ability to drive and / or work safely, and potential for overdose. It has also been made clear that these medications are for use by this patient only, without concomitant use of alcohol or other substances unless prescribed/advised by medical provider. Patient has completed controlled substance agreement detailing terms of continued prescribing of controlled substances including monitoring Marques reports, drug screens and pill counts. The patient was asked and states they are not receiving narcotic medication from any there provider or any provider that this office has not been made aware of. History and physical exam exhibit continued safe and appropriate use of controlled substances.   Goal: Improved quality of life and reduction in pain as evidenced by pt report.   Marques #[id number] has been reviewed as consistent.  UDS is on file.     Patient has been instructed and counseled  regarding opioid misuse and risk of addiction.  We have discussed proper storage and disposal of controlled medication.  Pt is at low risk for substance abuse or addiction. Pt will be monitored closely for compliance and the need to continue plan of care.     Patient advised that this provider will become more limited with the prescribing of chronic schedule II opioids after the end of this year.  Reviewed options including tapering off opioid therapy, changing to different treatment other than opiod, or referral to pain management.  Patient will consider and options will be discussed further at upcoming office visit(s).   Prescribing will always be at the discretion of the provider based on individual patient status and acute needs.    Referral to pain clinic placed and discussed need to keep this appointment.            Relevant Orders    Ambulatory Referral to Pain Management (Completed)       Musculoskeletal and Integument    DDD (degenerative disc disease), lumbar    Overview     1/4/2019 MRI Lumbar Spine findings  Severe multilevel degenerative disc disease in combination with facet  arthropathy and congenital shortening of the pedicles resulting in  generalized central canal stenosis detailed above.  2. Superimposed disc herniations at L1/2 and L5/S1 contributing to  central canal stenosis and producing mass effect on the central nerve  roots.  3. Severe right neural foraminal stenosis L5/S1 with mass effect on  exiting L5 nerve root laterally.  4. Degenerative type spondylolisthesis anteriorly 8 mm of L4 on L5.  5. Hypertrophic facet arthropathy with periarticular inflammation of  L3/4 and L4/5.            Relevant Medications    HYDROcodone-acetaminophen (NORCO)  MG per tablet    Other Relevant Orders    Urine Drug Screen - Urine, Clean Catch    Bulge of lumbar disc without myelopathy    Overview     Degenerative disc changes in the lumbar disc spaces. There  is a grade 1 spondylolisthesis at L4-5  felt to be secondary to facet  joint arthritis. There is  narrowing of the lateral recesses at L3-4 and  L4-5. The spinal stenosis is most severe at L4-5. There is a fairly  large disc herniation at L5-S1.      January 2019 MRI results   IMPRESSION:     1. Severe multilevel degenerative disc disease in combination with facet  arthropathy and congenital shortening of the pedicles resulting in  generalized central canal stenosis detailed above.  2. Superimposed disc herniations at L1/2 and L5/S1 contributing to  central canal stenosis and producing mass effect on the central nerve  roots.  3. Severe right neural foraminal stenosis L5/S1 with mass effect on  exiting L5 nerve root laterally.  4. Degenerative type spondylolisthesis anteriorly 8 mm of L4 on L5.  5. Hypertrophic facet arthropathy with periarticular inflammation of  L3/4 and L4/5.              Relevant Orders    Ambulatory Referral to Pain Management (Completed)       Other    Encounter for contraceptive management - Primary    Relevant Medications    MedroxyPROGESTERone Acetate (DEPO-PROVERA) injection 150 mg (Completed)    Other Relevant Orders    POCT pregnancy, urine (Completed)            Continue Neurontin.  No refills needed today.  Continue Norco  p.o. every 6 hours as needed #90 with no refill.         Patient's Body mass index is 29.79 kg/m². BMI is above normal parameters. Recommendations include: exercise counseling and nutrition counseling.    I advised Mirian of the risks of continuing to use tobacco, and I provided her with tobacco cessation educational materials in the After Visit Summary.     During this visit, I spent 3 minutes counseling the patient regarding tobacco cessation.      I have discussed diagnosis in detail today allowing time for questions and answers. Patient is aware of reasons to seek urgent or emergent medical care as well as reasons to return to the clinic for evaluation. Possible side effects, interactions and  progression of symptoms discussed as well. Patient / family states understanding.   Emotional support and active listening provided.       Follow up in one month, sooner if needed. Routine labs every 3-6 months.     Dictated utilizing Dragon dictation        This document has been electronically signed by:  CHARMAINE Doran, NP-C

## 2019-05-22 NOTE — ASSESSMENT & PLAN NOTE
Proper body mechanics has been reviewed and discussed today.      As part of this patient's treatment plan they are being prescribed controlled substance/substances with potential for abuse. This patient has been made aware of the appropriate use of such medications, including potential risk of somnolence, limited ability to drive and / or work safely, and potential for overdose. It has also been made clear that these medications are for use by this patient only, without concomitant use of alcohol or other substances unless prescribed/advised by medical provider. Patient has completed controlled substance agreement detailing terms of continued prescribing of controlled substances including monitoring Marques reports, drug screens and pill counts. The patient was asked and states they are not receiving narcotic medication from any there provider or any provider that this office has not been made aware of. History and physical exam exhibit continued safe and appropriate use of controlled substances.   Goal: Improved quality of life and reduction in pain as evidenced by pt report.   Marques #[id number] has been reviewed as consistent.  UDS is on file.     Patient has been instructed and counseled regarding opioid misuse and risk of addiction.  We have discussed proper storage and disposal of controlled medication.  Pt is at low risk for substance abuse or addiction. Pt will be monitored closely for compliance and the need to continue plan of care.     Patient advised that this provider will become more limited with the prescribing of chronic schedule II opioids after the end of this year.  Reviewed options including tapering off opioid therapy, changing to different treatment other than opiod, or referral to pain management.  Patient will consider and options will be discussed further at upcoming office visit(s).   Prescribing will always be at the discretion of the provider based on individual patient status and acute  needs.    Referral to pain clinic placed and discussed need to keep this appointment.

## 2019-05-27 RX ORDER — VARENICLINE TARTRATE 1 MG/1
TABLET, FILM COATED ORAL
Qty: 56 TABLET | Refills: 2 | Status: SHIPPED | OUTPATIENT
Start: 2019-05-27 | End: 2019-08-21 | Stop reason: SDUPTHER

## 2019-06-14 DIAGNOSIS — J32.0 MAXILLARY SINUSITIS, UNSPECIFIED CHRONICITY: ICD-10-CM

## 2019-06-16 RX ORDER — LORATADINE, PSEUDOEPHEDRINE SULFATE 5; 120 MG/1; MG/1
TABLET, FILM COATED, EXTENDED RELEASE ORAL
Qty: 30 TABLET | Refills: 3 | Status: SHIPPED | OUTPATIENT
Start: 2019-06-16 | End: 2019-06-19 | Stop reason: SDUPTHER

## 2019-06-19 ENCOUNTER — OFFICE VISIT (OUTPATIENT)
Dept: FAMILY MEDICINE CLINIC | Facility: CLINIC | Age: 49
End: 2019-06-19

## 2019-06-19 VITALS
HEART RATE: 105 BPM | DIASTOLIC BLOOD PRESSURE: 70 MMHG | WEIGHT: 176 LBS | SYSTOLIC BLOOD PRESSURE: 132 MMHG | BODY MASS INDEX: 29.32 KG/M2 | TEMPERATURE: 98.4 F | OXYGEN SATURATION: 99 % | HEIGHT: 65 IN

## 2019-06-19 DIAGNOSIS — E55.9 VITAMIN D DEFICIENCY: ICD-10-CM

## 2019-06-19 DIAGNOSIS — I10 ESSENTIAL HYPERTENSION: ICD-10-CM

## 2019-06-19 DIAGNOSIS — M51.36 DDD (DEGENERATIVE DISC DISEASE), LUMBAR: ICD-10-CM

## 2019-06-19 DIAGNOSIS — K57.20 DIVERTICULITIS OF LARGE INTESTINE WITH PERFORATION WITHOUT BLEEDING: Primary | ICD-10-CM

## 2019-06-19 DIAGNOSIS — R73.09 ELEVATED GLUCOSE: ICD-10-CM

## 2019-06-19 DIAGNOSIS — M48.062 SPINAL STENOSIS, LUMBAR REGION, WITH NEUROGENIC CLAUDICATION: ICD-10-CM

## 2019-06-19 DIAGNOSIS — F32.9 REACTIVE DEPRESSION: ICD-10-CM

## 2019-06-19 DIAGNOSIS — E53.8 VITAMIN B 12 DEFICIENCY: ICD-10-CM

## 2019-06-19 DIAGNOSIS — Z72.0 TOBACCO USE: ICD-10-CM

## 2019-06-19 DIAGNOSIS — J30.1 CHRONIC SEASONAL ALLERGIC RHINITIS DUE TO POLLEN: ICD-10-CM

## 2019-06-19 DIAGNOSIS — Z98.84 BARIATRIC SURGERY STATUS: ICD-10-CM

## 2019-06-19 PROCEDURE — 96372 THER/PROPH/DIAG INJ SC/IM: CPT | Performed by: NURSE PRACTITIONER

## 2019-06-19 PROCEDURE — 99214 OFFICE O/P EST MOD 30 MIN: CPT | Performed by: NURSE PRACTITIONER

## 2019-06-19 RX ORDER — GABAPENTIN 600 MG/1
600 TABLET ORAL 3 TIMES DAILY
Qty: 45 TABLET | Refills: 0 | Status: SHIPPED | OUTPATIENT
Start: 2019-06-19

## 2019-06-19 RX ORDER — MEDROXYPROGESTERONE ACETATE 150 MG/ML
1 INJECTION, SUSPENSION INTRAMUSCULAR
Qty: 1 ML | Refills: 3 | Status: SHIPPED | OUTPATIENT
Start: 2019-06-19 | End: 2020-01-28

## 2019-06-19 RX ORDER — FLUTICASONE PROPIONATE 50 MCG
1 SPRAY, SUSPENSION (ML) NASAL DAILY
Qty: 16 G | Refills: 5 | Status: SHIPPED | OUTPATIENT
Start: 2019-06-19 | End: 2020-06-24

## 2019-06-19 RX ORDER — NEOMYCIN/POLYMYXIN B/PRAMOXINE 3.5-10K-1
2 CREAM (GRAM) TOPICAL DAILY
Qty: 100 TABLET | Refills: 5 | Status: SHIPPED | OUTPATIENT
Start: 2019-06-19

## 2019-06-19 RX ORDER — HYDROCODONE BITARTRATE AND ACETAMINOPHEN 10; 325 MG/1; MG/1
1 TABLET ORAL EVERY 6 HOURS PRN
Qty: 45 TABLET | Refills: 0 | Status: SHIPPED | OUTPATIENT
Start: 2019-06-19 | End: 2020-07-02

## 2019-06-19 RX ORDER — DICLOFENAC SODIUM 75 MG/1
75 TABLET, DELAYED RELEASE ORAL 2 TIMES DAILY
Qty: 60 TABLET | Refills: 5 | Status: SHIPPED | OUTPATIENT
Start: 2019-06-19 | End: 2019-12-04 | Stop reason: SDUPTHER

## 2019-06-19 RX ORDER — ATENOLOL 25 MG/1
25 TABLET ORAL EVERY 12 HOURS SCHEDULED
Qty: 60 TABLET | Refills: 5 | Status: SHIPPED | OUTPATIENT
Start: 2019-06-19 | End: 2019-12-31

## 2019-06-19 RX ORDER — LISINOPRIL 10 MG/1
10 TABLET ORAL DAILY
Qty: 30 TABLET | Refills: 5 | Status: SHIPPED | OUTPATIENT
Start: 2019-06-19 | End: 2019-12-31

## 2019-06-19 RX ORDER — SIMVASTATIN 20 MG
20 TABLET ORAL NIGHTLY
Qty: 30 TABLET | Refills: 5 | Status: SHIPPED | OUTPATIENT
Start: 2019-06-19 | End: 2019-12-03 | Stop reason: SDUPTHER

## 2019-06-19 RX ORDER — METHOCARBAMOL 750 MG/1
750 TABLET, FILM COATED ORAL 2 TIMES DAILY
Qty: 60 TABLET | Refills: 5 | Status: SHIPPED | OUTPATIENT
Start: 2019-06-19 | End: 2020-05-27

## 2019-06-19 RX ORDER — LACTOSE-REDUCED FOOD
1 POWDER (GRAM) ORAL 2 TIMES DAILY
Qty: 330 ML | Refills: 5 | Status: SHIPPED | OUTPATIENT
Start: 2019-06-19 | End: 2019-10-15

## 2019-06-19 RX ORDER — HYDROCHLOROTHIAZIDE 25 MG/1
25 TABLET ORAL DAILY
Qty: 30 TABLET | Refills: 5 | Status: SHIPPED | OUTPATIENT
Start: 2019-06-19 | End: 2020-01-30

## 2019-06-19 RX ORDER — ESOMEPRAZOLE MAGNESIUM 40 MG/1
40 CAPSULE, DELAYED RELEASE ORAL
Qty: 30 CAPSULE | Refills: 5 | Status: SHIPPED | OUTPATIENT
Start: 2019-06-19 | End: 2020-05-27

## 2019-06-19 RX ORDER — DULOXETIN HYDROCHLORIDE 60 MG/1
60 CAPSULE, DELAYED RELEASE ORAL DAILY
Qty: 30 CAPSULE | Refills: 3 | Status: SHIPPED | OUTPATIENT
Start: 2019-06-19 | End: 2020-01-06

## 2019-06-19 RX ORDER — LEVOCETIRIZINE DIHYDROCHLORIDE 5 MG/1
5 TABLET, FILM COATED ORAL EVERY EVENING
Qty: 30 TABLET | Refills: 5 | Status: SHIPPED | OUTPATIENT
Start: 2019-06-19 | End: 2019-06-19

## 2019-06-19 RX ORDER — LACTOBACILLUS RHAMNOSUS GG 10B CELL
1 CAPSULE ORAL 2 TIMES DAILY
Qty: 60 CAPSULE | Refills: 5 | Status: SHIPPED | OUTPATIENT
Start: 2019-06-19 | End: 2019-10-15

## 2019-06-19 RX ORDER — NIZATIDINE 150 MG/1
150 CAPSULE ORAL 2 TIMES DAILY
Qty: 60 CAPSULE | Refills: 5 | Status: SHIPPED | OUTPATIENT
Start: 2019-06-19 | End: 2019-12-03 | Stop reason: SDUPTHER

## 2019-06-19 RX ORDER — ERGOCALCIFEROL 1.25 MG/1
50000 CAPSULE ORAL WEEKLY
Qty: 4 CAPSULE | Refills: 5 | Status: SHIPPED | OUTPATIENT
Start: 2019-06-19 | End: 2020-06-24

## 2019-06-19 RX ORDER — POTASSIUM CHLORIDE 750 MG/1
10 TABLET, EXTENDED RELEASE ORAL 2 TIMES DAILY
Qty: 60 TABLET | Refills: 5 | Status: SHIPPED | OUTPATIENT
Start: 2019-06-19 | End: 2019-10-15

## 2019-06-19 RX ORDER — POTASSIUM CHLORIDE 750 MG/1
10 TABLET, FILM COATED, EXTENDED RELEASE ORAL 2 TIMES DAILY
Qty: 60 TABLET | Refills: 3 | Status: SHIPPED | OUTPATIENT
Start: 2019-06-19 | End: 2020-06-24

## 2019-06-19 RX ADMIN — CYANOCOBALAMIN 1000 MCG: 1000 INJECTION, SOLUTION INTRAMUSCULAR; SUBCUTANEOUS at 11:21

## 2019-06-19 NOTE — ASSESSMENT & PLAN NOTE
Proper body mechanics has been reviewed and discussed today.      As part of this patient's treatment plan they are being prescribed controlled substance/substances with potential for abuse. This patient has been made aware of the appropriate use of such medications, including potential risk of somnolence, limited ability to drive and / or work safely, and potential for overdose. It has also been made clear that these medications are for use by this patient only, without concomitant use of alcohol or other substances unless prescribed/advised by medical provider. Patient has completed controlled substance agreement detailing terms of continued prescribing of controlled substances including monitoring Marques reports, drug screens and pill counts. The patient was asked and states they are not receiving narcotic medication from any there provider or any provider that this office has not been made aware of. History and physical exam exhibit continued safe and appropriate use of controlled substances.   Goal: Improved quality of life and reduction in pain as evidenced by pt report.   Marques #[id number] has been reviewed as consistent.  UDS is on file.     Patient has been instructed and counseled regarding opioid misuse and risk of addiction.  We have discussed proper storage and disposal of controlled medication.  Pt is at low risk for substance abuse or addiction. Pt will be monitored closely for compliance and the need to continue plan of care.     Patient advised that this provider will become more limited with the prescribing of chronic schedule II opioids after the end of this year.  Reviewed options including tapering off opioid therapy, changing to different treatment other than opiod, or referral to pain management.  Patient will consider and options will be discussed further at upcoming office visit(s).   Prescribing will always be at the discretion of the provider based on individual patient status and acute  needs.      Will provide patient with a 2-week supply of gabapentin and Norco.  Patient has a pain clinic consult scheduled July 2 which she states intent to keep.    Patient has been compliant with urine drug screen and Marques.  The goal is that pain management will provide her with better pain control and improved quality of life.

## 2019-06-19 NOTE — ASSESSMENT & PLAN NOTE
Patient has been compliant and consistent with her medications and Marques.  She is scheduled for a pain clinic consult for better pain management.

## 2019-06-19 NOTE — ASSESSMENT & PLAN NOTE
Avoid irritating foods.  Recommend patient follow-up with GI with any additional symptoms or flares.

## 2019-06-19 NOTE — PROGRESS NOTES
Subjective   Mirian Fischer is a 49 y.o. female.     Chief Complaint   Patient presents with   • Hypertension   • Back Pain   • B12 Injection       History of Present Illness:      Chronic pain - pt is scheduled to see a pain clinic on July 2 2019. Rates her pain as 3-4 on psr of 0-10.       B12 deficiency- stable with monthly B12 injections    Chronic pain syndrome-degenerative disc changes in the lumbar spine with radiology showing a grade 1 spondylolisthesis at L4-5 felt to be secondary to facet joint arthritis.  There is narrowing of the lateral recesses at L3-4 and L4-5.  Spinal stenosis is most severe at L4-5.  There is a large disc herniation at L5-S1.  She has multilevel degenerative disc disease in combination with facet arthropathy and congenital shortening of the pedicles resulting in a generalized central canal stenosis.  She has been for multiple neurology consults.  Surgery is recommended however is not a option as she is the sole provider of care for her handicapped child.  She rates her pain as 5 on a pain scale rating of 0-10.  Pain is described as sharp, catching and constant.  Pain is made worse with activity such as lifting, bending, twisting and the activities of daily living.  Patient reports that at times she has difficulty doing her daily routine due to severe pain.  She currently receives Neurontin and Norco.  She states that these medications do help decrease her pain and improve her quality of life though they do not control her pain.  We have discussed pain clinic consult in the past.  Patient does report having difficulty with transportation and childcare for out of town medical appointments.  She is scheduled with  a pain clinic for consult July 2, 2019  to see Dr. Chadd Chand so she could ride with her family member.    Patient has been compliant with her urine drug screen and Marques.  She has no history of substance abuse or addiction.       Contraceptive management-stable with  "Depo-Provera injections.    History of bariatric surgery- would like to know about some protein supplements.  She has very little appetite and foods cause bloating and irritation at times.    Chronic tobacco use.  Has smoked all of her adult life.  Smokes one half or more packs of cigarettes daily.  Patient would like to quit smoking.  She has Chantix at home but has not been taking daily.  Must quit smoking order to have spinal surgery which has been recommended.      Chronic allergies with frequent sinus pressure and sinusitis-patient is currently taking Alavert which seems to be helping her symptoms.  She has been taking twice daily though pharmacy only gave her enough for 2-week supply.  Patient reports a large improvement in her sinus/allergy symptoms with this medication.      The following portions of the patient's history were reviewed and updated as appropriate:  Allergies, current medications, past family history, past medical history, past social history, past surgical history and problem list.    Review of Systems   Constitutional: Positive for appetite change and fatigue. Negative for activity change.   HENT: Negative for congestion, sinus pressure and sinus pain.    Eyes: Positive for visual disturbance. Negative for pain.   Respiratory: Negative for chest tightness and shortness of breath.    Cardiovascular: Positive for palpitations and leg swelling. Negative for chest pain.   Gastrointestinal: Positive for abdominal pain, constipation and nausea. Negative for diarrhea.   Endocrine: Negative for polyuria.   Genitourinary: Positive for pelvic pain. Negative for dysuria.   Musculoskeletal: Positive for arthralgias, back pain and gait problem. Negative for neck pain.   Neurological: Positive for dizziness and headaches.   Psychiatric/Behavioral: The patient is nervous/anxious.        Objective     /70   Pulse 105   Temp 98.4 °F (36.9 °C) (Temporal)   Ht 165.1 cm (65\")   Wt 79.8 kg (176 lb)   " SpO2 99%   BMI 29.29 kg/m²   Office Visit on 05/22/2019   Component Date Value Ref Range Status   • HCG, Urine, QL 05/22/2019 Negative  Negative Final   • Lot Number 05/22/2019 [id number]   Final   • Internal Positive Control 05/22/2019 Positive   Final   • Internal Negative Control 05/22/2019 Negative   Final       Physical Exam   Constitutional: She is oriented to person, place, and time. Vital signs are normal. She appears well-developed and well-nourished. No distress.   Pleasant and appropriate 49 year old female in no acute distress.    HENT:   Head: Normocephalic.   Right Ear: Hearing, external ear and ear canal normal. No drainage. Tympanic membrane is not bulging.   Left Ear: Hearing, external ear and ear canal normal. No drainage. Tympanic membrane is not bulging.   Nose: No mucosal edema. Right sinus exhibits no maxillary sinus tenderness and no frontal sinus tenderness. Left sinus exhibits no maxillary sinus tenderness and no frontal sinus tenderness.   Mouth/Throat: Oropharynx is clear and moist. No oropharyngeal exudate or posterior oropharyngeal erythema.   Eyes: Conjunctivae are normal. Pupils are equal, round, and reactive to light. Right eye exhibits no discharge. Left eye exhibits no discharge.   Neck: Normal range of motion. Neck supple. No tracheal deviation present. No thyromegaly present.   Cardiovascular: Normal rate, regular rhythm and normal heart sounds. Exam reveals no gallop and no friction rub.   No murmur heard.  Pulmonary/Chest: Effort normal and breath sounds normal. No respiratory distress. She has no wheezes. She has no rales. She exhibits no tenderness.   Abdominal: Soft. Bowel sounds are normal. She exhibits no distension and no mass. There is no tenderness. There is no rebound and no guarding.   Musculoskeletal:        Lumbar back: She exhibits decreased range of motion, tenderness, pain and spasm.        Right foot: There is no swelling.        Left foot: There is no swelling.    Neurological: She is alert and oriented to person, place, and time. She is not disoriented. A sensory deficit (Decreased soft and sharp sensation bilateral lower extremities) is present. No cranial nerve deficit. She exhibits abnormal muscle tone.   Reflex Scores:       Patellar reflexes are 1+ on the right side and 2+ on the left side.  Skin: Skin is warm and dry. Capillary refill takes less than 2 seconds. No rash noted. She is not diaphoretic. No erythema.   Psychiatric: She has a normal mood and affect. Her speech is normal and behavior is normal. Judgment and thought content normal. Her affect is not inappropriate. Cognition and memory are normal.   Vitals reviewed.      Assessment/Plan     Problem List Items Addressed This Visit        Cardiovascular and Mediastinum    Essential hypertension    Relevant Medications    atenolol (TENORMIN) 25 MG tablet    hydrochlorothiazide (HYDRODIURIL) 25 MG tablet    lisinopril (PRINIVIL,ZESTRIL) 10 MG tablet    potassium chloride (K-DUR) 10 MEQ CR tablet    potassium chloride (K-DUR,KLOR-CON) 10 MEQ CR tablet    simvastatin (ZOCOR) 20 MG tablet       Digestive    Diverticulitis of large intestine with perforation without bleeding - Primary    Current Assessment & Plan     Avoid irritating foods.  Recommend patient follow-up with GI with any additional symptoms or flares.         Relevant Medications    Lactobacillus-Inulin ([facility] DIGESTIVE [facility]) capsule    linaclotide (LINZESS) 290 MCG capsule capsule    Multiple Vitamins-Minerals (MULTI-VITAMIN GUMMIES) chewable tablet    nizatidine (AXID) 150 MG capsule    Nutritional Supplements (ENSURE MAX PROTEIN) liquid    Vitamin B 12 deficiency    Overview     Monthly b-12 injections         Vitamin D deficiency    Relevant Medications    vitamin D (ERGOCALCIFEROL) 73422 units capsule capsule       Nervous and Auditory    Spinal stenosis, lumbar region, with neurogenic claudication    Overview     Surgery has been  recommended         Current Assessment & Plan     Patient has been compliant and consistent with her medications and Marques.  She is scheduled for a pain clinic consult for better pain management.          Relevant Medications    gabapentin (NEURONTIN) 600 MG tablet       Musculoskeletal and Integument    DDD (degenerative disc disease), lumbar    Overview     1/4/2019 MRI Lumbar Spine findings  Severe multilevel degenerative disc disease in combination with facet  arthropathy and congenital shortening of the pedicles resulting in  generalized central canal stenosis detailed above.  2. Superimposed disc herniations at L1/2 and L5/S1 contributing to  central canal stenosis and producing mass effect on the central nerve  roots.  3. Severe right neural foraminal stenosis L5/S1 with mass effect on  exiting L5 nerve root laterally.  4. Degenerative type spondylolisthesis anteriorly 8 mm of L4 on L5.  5. Hypertrophic facet arthropathy with periarticular inflammation of  L3/4 and L4/5.            Current Assessment & Plan     Proper body mechanics has been reviewed and discussed today.      As part of this patient's treatment plan they are being prescribed controlled substance/substances with potential for abuse. This patient has been made aware of the appropriate use of such medications, including potential risk of somnolence, limited ability to drive and / or work safely, and potential for overdose. It has also been made clear that these medications are for use by this patient only, without concomitant use of alcohol or other substances unless prescribed/advised by medical provider. Patient has completed controlled substance agreement detailing terms of continued prescribing of controlled substances including monitoring Marques reports, drug screens and pill counts. The patient was asked and states they are not receiving narcotic medication from any there provider or any provider that this office has not been made aware  of. History and physical exam exhibit continued safe and appropriate use of controlled substances.   Goal: Improved quality of life and reduction in pain as evidenced by pt report.   Marques #[id number] has been reviewed as consistent.  UDS is on file.     Patient has been instructed and counseled regarding opioid misuse and risk of addiction.  We have discussed proper storage and disposal of controlled medication.  Pt is at low risk for substance abuse or addiction. Pt will be monitored closely for compliance and the need to continue plan of care.     Patient advised that this provider will become more limited with the prescribing of chronic schedule II opioids after the end of this year.  Reviewed options including tapering off opioid therapy, changing to different treatment other than opiod, or referral to pain management.  Patient will consider and options will be discussed further at upcoming office visit(s).   Prescribing will always be at the discretion of the provider based on individual patient status and acute needs.      Will provide patient with a 2-week supply of gabapentin and Norco.  Patient has a pain clinic consult scheduled July 2 which she states intent to keep.    Patient has been compliant with urine drug screen and Marques.  The goal is that pain management will provide her with better pain control and improved quality of life.          Relevant Medications    HYDROcodone-acetaminophen (NORCO)  MG per tablet    diclofenac (VOLTAREN) 75 MG EC tablet    methocarbamol (ROBAXIN) 750 MG tablet       Other    Tobacco use    Current Assessment & Plan     I have strongly encouraged smoke cessation and encouraged patient start back on her Chantix which she has at home.         Reactive depression    Overview     Due to her current home bound status and health of her son          Relevant Medications    DULoxetine (CYMBALTA) 60 MG capsule    Bariatric surgery status    Relevant Medications     Nutritional Supplements (ENSURE MAX PROTEIN) liquid      Other Visit Diagnoses     Chronic seasonal allergic rhinitis due to pollen        symptomatic treatment, steam therapy, fluids, stop smoking.     Relevant Medications    loratadine-pseudoephedrine (ALAVERT ALLERGY/SINUS) 5-120 MG per 12 hr tablet    fluticasone (FLONASE) 50 MCG/ACT nasal spray          Refill all routine medications.  Patient will now be following up every 3 months, sooner if needed.         Patient's Body mass index is 29.29 kg/m². BMI is above normal parameters. Recommendations include: exercise counseling and nutrition counseling.    I have discussed diagnosis in detail today allowing time for questions and answers. Patient is aware of reasons to seek urgent or emergent medical care as well as reasons to return to the clinic for evaluation. Possible side effects, interactions and progression of symptoms discussed as well. Patient / family states understanding.   Emotional support and active listening provided.       Follow up in 3 months. Routine labs fasting one week prior to next office visit. Return sooner if needed.     Dictated utilizing Dragon dictation          This document has been electronically signed by:  CHARMAINE Doran, NP-C

## 2019-06-19 NOTE — ASSESSMENT & PLAN NOTE
I have strongly encouraged smoke cessation and encouraged patient start back on her Chantix which she has at home.

## 2019-06-20 LAB
25(OH)D3+25(OH)D2 SERPL-MCNC: 30.7 NG/ML (ref 30–100)
ALBUMIN SERPL-MCNC: 4.4 G/DL (ref 3.5–5.2)
ALBUMIN/CREAT UR: 4.5 MG/G CREAT (ref 0–30)
ALBUMIN/GLOB SERPL: 2.4 G/DL
ALP SERPL-CCNC: 59 U/L (ref 39–117)
ALT SERPL-CCNC: 16 U/L (ref 1–33)
AST SERPL-CCNC: 11 U/L (ref 1–32)
BASOPHILS # BLD AUTO: 0.08 10*3/MM3 (ref 0–0.2)
BASOPHILS NFR BLD AUTO: 0.8 % (ref 0–1.5)
BILIRUB SERPL-MCNC: <0.2 MG/DL (ref 0.2–1.2)
BUN SERPL-MCNC: 4 MG/DL (ref 6–20)
BUN/CREAT SERPL: 8.2 (ref 7–25)
CALCIUM SERPL-MCNC: 8.8 MG/DL (ref 8.6–10.5)
CHLORIDE SERPL-SCNC: 99 MMOL/L (ref 98–107)
CHOLEST SERPL-MCNC: 160 MG/DL (ref 0–200)
CO2 SERPL-SCNC: 24.4 MMOL/L (ref 22–29)
CREAT SERPL-MCNC: 0.49 MG/DL (ref 0.57–1)
CREAT UR-MCNC: 101.2 MG/DL
EOSINOPHIL # BLD AUTO: 0.31 10*3/MM3 (ref 0–0.4)
EOSINOPHIL NFR BLD AUTO: 3.1 % (ref 0.3–6.2)
ERYTHROCYTE [DISTWIDTH] IN BLOOD BY AUTOMATED COUNT: 14.8 % (ref 12.3–15.4)
GLOBULIN SER CALC-MCNC: 1.8 GM/DL
GLUCOSE SERPL-MCNC: 106 MG/DL (ref 65–99)
HBA1C MFR BLD: 5.75 % (ref 4.8–5.6)
HCT VFR BLD AUTO: 39.8 % (ref 34–46.6)
HDLC SERPL-MCNC: 53 MG/DL (ref 40–60)
HGB BLD-MCNC: 12.3 G/DL (ref 12–15.9)
IMM GRANULOCYTES # BLD AUTO: 0.05 10*3/MM3 (ref 0–0.05)
IMM GRANULOCYTES NFR BLD AUTO: 0.5 % (ref 0–0.5)
LDLC SERPL CALC-MCNC: 89 MG/DL (ref 0–100)
LYMPHOCYTES # BLD AUTO: 3.15 10*3/MM3 (ref 0.7–3.1)
LYMPHOCYTES NFR BLD AUTO: 31.8 % (ref 19.6–45.3)
MCH RBC QN AUTO: 27.2 PG (ref 26.6–33)
MCHC RBC AUTO-ENTMCNC: 30.9 G/DL (ref 31.5–35.7)
MCV RBC AUTO: 87.9 FL (ref 79–97)
MICROALBUMIN UR-MCNC: 4.6 UG/ML
MONOCYTES # BLD AUTO: 0.66 10*3/MM3 (ref 0.1–0.9)
MONOCYTES NFR BLD AUTO: 6.7 % (ref 5–12)
NEUTROPHILS # BLD AUTO: 5.67 10*3/MM3 (ref 1.7–7)
NEUTROPHILS NFR BLD AUTO: 57.1 % (ref 42.7–76)
NRBC BLD AUTO-RTO: 0 /100 WBC (ref 0–0.2)
PLATELET # BLD AUTO: 267 10*3/MM3 (ref 140–450)
POTASSIUM SERPL-SCNC: 4.3 MMOL/L (ref 3.5–5.2)
PROT SERPL-MCNC: 6.2 G/DL (ref 6–8.5)
RBC # BLD AUTO: 4.53 10*6/MM3 (ref 3.77–5.28)
SODIUM SERPL-SCNC: 135 MMOL/L (ref 136–145)
TRIGL SERPL-MCNC: 88 MG/DL (ref 0–150)
TSH SERPL DL<=0.005 MIU/L-ACNC: 0.95 UIU/ML (ref 0.45–4.5)
VIT B12 SERPL-MCNC: 721 PG/ML (ref 211–946)
VLDLC SERPL CALC-MCNC: 17.6 MG/DL
WBC # BLD AUTO: 9.92 10*3/MM3 (ref 3.4–10.8)

## 2019-08-21 RX ORDER — VARENICLINE TARTRATE 1 MG/1
TABLET, FILM COATED ORAL
Qty: 56 TABLET | Refills: 2 | Status: SHIPPED | OUTPATIENT
Start: 2019-08-21 | End: 2019-12-31

## 2019-10-15 ENCOUNTER — OFFICE VISIT (OUTPATIENT)
Dept: FAMILY MEDICINE CLINIC | Facility: CLINIC | Age: 49
End: 2019-10-15

## 2019-10-15 VITALS
OXYGEN SATURATION: 98 % | HEIGHT: 65 IN | BODY MASS INDEX: 28.16 KG/M2 | HEART RATE: 78 BPM | WEIGHT: 169 LBS | TEMPERATURE: 97.1 F | DIASTOLIC BLOOD PRESSURE: 80 MMHG | SYSTOLIC BLOOD PRESSURE: 120 MMHG

## 2019-10-15 DIAGNOSIS — D51.3 OTHER DIETARY VITAMIN B12 DEFICIENCY ANEMIA: ICD-10-CM

## 2019-10-15 DIAGNOSIS — M48.062 SPINAL STENOSIS, LUMBAR REGION, WITH NEUROGENIC CLAUDICATION: ICD-10-CM

## 2019-10-15 DIAGNOSIS — R79.9 ABNORMAL FINDING OF BLOOD CHEMISTRY, UNSPECIFIED: ICD-10-CM

## 2019-10-15 DIAGNOSIS — I10 ESSENTIAL HYPERTENSION: Primary | ICD-10-CM

## 2019-10-15 DIAGNOSIS — E55.9 VITAMIN D DEFICIENCY: ICD-10-CM

## 2019-10-15 DIAGNOSIS — F41.8 DEPRESSION WITH ANXIETY: ICD-10-CM

## 2019-10-15 DIAGNOSIS — M51.26 HNP (HERNIATED NUCLEUS PULPOSUS), LUMBAR: ICD-10-CM

## 2019-10-15 PROBLEM — E11.51 CONTROLLED TYPE 2 DM WITH PERIPHERAL CIRCULATORY DISORDER (HCC): Status: RESOLVED | Noted: 2017-01-17 | Resolved: 2019-10-15

## 2019-10-15 PROCEDURE — 96372 THER/PROPH/DIAG INJ SC/IM: CPT | Performed by: NURSE PRACTITIONER

## 2019-10-15 PROCEDURE — 99214 OFFICE O/P EST MOD 30 MIN: CPT | Performed by: NURSE PRACTITIONER

## 2019-10-15 RX ORDER — FLUOXETINE 10 MG/1
10 CAPSULE ORAL DAILY
Qty: 30 CAPSULE | Refills: 5 | Status: SHIPPED | OUTPATIENT
Start: 2019-10-15 | End: 2020-05-27

## 2019-10-15 RX ORDER — BUSPIRONE HYDROCHLORIDE 5 MG/1
5 TABLET ORAL 3 TIMES DAILY PRN
Qty: 90 TABLET | Refills: 5 | Status: SHIPPED | OUTPATIENT
Start: 2019-10-15 | End: 2020-05-27

## 2019-10-15 RX ORDER — LEVOCETIRIZINE DIHYDROCHLORIDE 5 MG/1
5 TABLET, FILM COATED ORAL EVERY EVENING
Refills: 5 | COMMUNITY
Start: 2019-10-02 | End: 2020-07-02

## 2019-10-15 RX ADMIN — CYANOCOBALAMIN 1000 MCG: 1000 INJECTION, SOLUTION INTRAMUSCULAR; SUBCUTANEOUS at 09:24

## 2019-10-15 NOTE — ASSESSMENT & PLAN NOTE
Hypertension is Stable with current medication regimen.  Continue current treatment regimen.  Dietary sodium restriction.  Regular aerobic exercise.  Stop smoking.  Blood pressure will be reassessed at the next regular appointment.

## 2019-10-15 NOTE — PROGRESS NOTES
Subjective   Mirian Fischer is a 49 y.o. female.     Chief Complaint   Patient presents with   • Hypertension       History of Present Illness:    Vitamin D deficiency-taking a supplement.  Poor absorption due to bariatric surgery.    Anemia due to B12 deficiency-taking monthly B12 injection.  Patient is not coming in for monthly visits anymore and has a hard time making monthly walk-in visits for B12 injection.    Essential hypertension-stable with current medication regimen.    New complaint of worsening depression.  Patient currently takes Cymbalta.  She has a handicapped child that she provides total care for her.  Her child is unable to walk, is incontinent and requires total care.  Patient has multiple bulging disks located throughout her spine and is likely looking at fusion or other spinal surgeries.  She is very worried about what will happen to her child and who will care for him when she is unable.  She states that she cries a lot worried about having to put him in a temporary nursing home.  She worries about the fact that she is dependent on family members to watch him when she needs to come for an office visit.  She denies any thoughts of hurting self or others.  She would like to discuss medications that could help her with a feeling of panic attack and depression.        The following portions of the patient's history were reviewed and updated as appropriate:  Allergies, current medications, past family history, past medical history, past social history, past surgical history and problem list.    Review of Systems   Constitutional: Positive for activity change (Due to worsening back pain). Negative for appetite change, fatigue and unexpected weight change.   HENT: Negative for congestion, ear pain, nosebleeds, postnasal drip, rhinorrhea, sore throat, trouble swallowing and voice change.    Eyes: Negative for pain and visual disturbance.   Respiratory: Negative for cough, shortness of breath and  "wheezing.    Cardiovascular: Negative for chest pain and palpitations.   Gastrointestinal: Negative for abdominal distention, abdominal pain, blood in stool, constipation and diarrhea.   Endocrine: Negative for cold intolerance and polydipsia.   Genitourinary: Negative for difficulty urinating, dysuria, flank pain, hematuria and pelvic pain.   Musculoskeletal: Positive for arthralgias and back pain. Negative for gait problem, joint swelling and myalgias.        She is under the care of neurology.  She has received 2 spinal injections since her last visit.   Skin: Negative for color change and rash.   Allergic/Immunologic: Positive for environmental allergies. Negative for food allergies and immunocompromised state.   Neurological: Positive for weakness, numbness and headaches. Negative for syncope.   Hematological: Negative.    Psychiatric/Behavioral: Positive for dysphoric mood. Negative for self-injury, sleep disturbance and suicidal ideas. The patient is nervous/anxious.    All other systems reviewed and are negative.      Objective     /80   Pulse 78   Temp 97.1 °F (36.2 °C) (Tympanic)   Ht 165.1 cm (65\")   Wt 76.7 kg (169 lb)   SpO2 98%   BMI 28.12 kg/m²   Office Visit on 06/19/2019   Component Date Value Ref Range Status   • WBC 06/19/2019 9.92  3.40 - 10.80 10*3/mm3 Final   • RBC 06/19/2019 4.53  3.77 - 5.28 10*6/mm3 Final   • Hemoglobin 06/19/2019 12.3  12.0 - 15.9 g/dL Final   • Hematocrit 06/19/2019 39.8  34.0 - 46.6 % Final   • MCV 06/19/2019 87.9  79.0 - 97.0 fL Final   • MCH 06/19/2019 27.2  26.6 - 33.0 pg Final   • MCHC 06/19/2019 30.9* 31.5 - 35.7 g/dL Final   • RDW 06/19/2019 14.8  12.3 - 15.4 % Final   • Platelets 06/19/2019 267  140 - 450 10*3/mm3 Final   • Neutrophil Rel % 06/19/2019 57.1  42.7 - 76.0 % Final   • Lymphocyte Rel % 06/19/2019 31.8  19.6 - 45.3 % Final   • Monocyte Rel % 06/19/2019 6.7  5.0 - 12.0 % Final   • Eosinophil Rel % 06/19/2019 3.1  0.3 - 6.2 % Final   • Basophil " Rel % 06/19/2019 0.8  0.0 - 1.5 % Final   • Neutrophils Absolute 06/19/2019 5.67  1.70 - 7.00 10*3/mm3 Final   • Lymphocytes Absolute 06/19/2019 3.15* 0.70 - 3.10 10*3/mm3 Final   • Monocytes Absolute 06/19/2019 0.66  0.10 - 0.90 10*3/mm3 Final   • Eosinophils Absolute 06/19/2019 0.31  0.00 - 0.40 10*3/mm3 Final   • Basophils Absolute 06/19/2019 0.08  0.00 - 0.20 10*3/mm3 Final   • Immature Granulocyte Rel % 06/19/2019 0.5  0.0 - 0.5 % Final   • Immature Grans Absolute 06/19/2019 0.05  0.00 - 0.05 10*3/mm3 Final   • nRBC 06/19/2019 0.0  0.0 - 0.2 /100 WBC Final   • Glucose 06/19/2019 106* 65 - 99 mg/dL Final   • BUN 06/19/2019 4* 6 - 20 mg/dL Final   • Creatinine 06/19/2019 0.49* 0.57 - 1.00 mg/dL Final   • eGFR Non  Am 06/19/2019 134  >60 mL/min/1.73 Final   • eGFR African Am 06/19/2019 >150  >60 mL/min/1.73 Final   • BUN/Creatinine Ratio 06/19/2019 8.2  7.0 - 25.0 Final   • Sodium 06/19/2019 135* 136 - 145 mmol/L Final   • Potassium 06/19/2019 4.3  3.5 - 5.2 mmol/L Final   • Chloride 06/19/2019 99  98 - 107 mmol/L Final   • Total CO2 06/19/2019 24.4  22.0 - 29.0 mmol/L Final   • Calcium 06/19/2019 8.8  8.6 - 10.5 mg/dL Final   • Total Protein 06/19/2019 6.2  6.0 - 8.5 g/dL Final   • Albumin 06/19/2019 4.40  3.50 - 5.20 g/dL Final   • Globulin 06/19/2019 1.8  gm/dL Final   • A/G Ratio 06/19/2019 2.4  g/dL Final   • Total Bilirubin 06/19/2019 <0.2* 0.2 - 1.2 mg/dL Final   • Alkaline Phosphatase 06/19/2019 59  39 - 117 U/L Final   • AST (SGOT) 06/19/2019 11  1 - 32 U/L Final   • ALT (SGPT) 06/19/2019 16  1 - 33 U/L Final   • Creatinine, Urine 06/19/2019 101.2  Not Estab. mg/dL Final   • Microalbumin, Urine 06/19/2019 4.6  Not Estab. ug/mL Final   • Microalbumin/Creatinine Ratio 06/19/2019 4.5  0.0 - 30.0 mg/g creat Final   • Vitamin B-12 06/19/2019 721  211 - 946 pg/mL Final   • 25 Hydroxy, Vitamin D 06/19/2019 30.7  30.0 - 100.0 ng/ml Final   • Hemoglobin A1C 06/19/2019 5.75* 4.80 - 5.60 % Final   • TSH  06/19/2019 0.952  0.450 - 4.500 uIU/mL Final   • Total Cholesterol 06/19/2019 160  0 - 200 mg/dL Final   • Triglycerides 06/19/2019 88  0 - 150 mg/dL Final   • HDL Cholesterol 06/19/2019 53  40 - 60 mg/dL Final   • VLDL Cholesterol 06/19/2019 17.6  mg/dL Final   • LDL Cholesterol  06/19/2019 89  0 - 100 mg/dL Final       Physical Exam   Constitutional: She is oriented to person, place, and time. Vital signs are normal. She appears well-developed and well-nourished. No distress.   Pleasant and appropriate 49 year old female in no acute distress.  She is tearful when discussing her health condition and her responsibilities with her handicapped child   HENT:   Head: Normocephalic.   Right Ear: Hearing, external ear and ear canal normal. No drainage. Tympanic membrane is not bulging.   Left Ear: Hearing, external ear and ear canal normal. No drainage. Tympanic membrane is not bulging.   Nose: No mucosal edema. Right sinus exhibits no maxillary sinus tenderness and no frontal sinus tenderness. Left sinus exhibits no maxillary sinus tenderness and no frontal sinus tenderness.   Mouth/Throat: Oropharynx is clear and moist. No oropharyngeal exudate or posterior oropharyngeal erythema.   Eyes: Conjunctivae are normal. Pupils are equal, round, and reactive to light. Right eye exhibits no discharge. Left eye exhibits no discharge.   Neck: Normal range of motion. Neck supple. No tracheal deviation present. No thyromegaly present.   Cardiovascular: Normal rate, regular rhythm and normal heart sounds. Exam reveals no gallop and no friction rub.   No murmur heard.  Pulmonary/Chest: Effort normal and breath sounds normal. No respiratory distress. She has no wheezes. She has no rales. She exhibits no tenderness.   Abdominal: Soft. Bowel sounds are normal. She exhibits no distension and no mass. There is no tenderness. There is no rebound and no guarding.   Musculoskeletal:        Cervical back: She exhibits normal range of motion.         Thoracic back: She exhibits decreased range of motion and tenderness.        Lumbar back: She exhibits decreased range of motion, tenderness, pain and spasm.        Right foot: There is no swelling.        Left foot: There is no swelling.   Neurological: She is alert and oriented to person, place, and time. She is not disoriented. A sensory deficit (Decreased soft and sharp sensation bilateral lower extremities) is present. No cranial nerve deficit. She exhibits abnormal muscle tone.   Reflex Scores:       Patellar reflexes are 1+ on the right side and 2+ on the left side.  Skin: Skin is warm, dry and intact. Capillary refill takes less than 2 seconds. No rash noted. She is not diaphoretic. No erythema. No pallor.   Psychiatric: She has a normal mood and affect. Her speech is normal and behavior is normal. Judgment and thought content normal. Her affect is not inappropriate. Cognition and memory are normal.   Vitals reviewed.      Assessment/Plan     Problem List Items Addressed This Visit        Cardiovascular and Mediastinum    Essential hypertension - Primary    Current Assessment & Plan     Hypertension is Stable with current medication regimen.  Continue current treatment regimen.  Dietary sodium restriction.  Regular aerobic exercise.  Stop smoking.  Blood pressure will be reassessed at the next regular appointment.         Relevant Orders    CBC & Differential    Comprehensive Metabolic Panel    TSH    Hemoglobin A1c    Vitamin D 25 Hydroxy    Vitamin B12    Lipid Panel       Digestive    Anemia due to vitamin B12 deficiency    Relevant Medications    vitamin B-12 (CYANOCOBALAMIN) 250 MCG tablet    Other Relevant Orders    CBC & Differential    Comprehensive Metabolic Panel    TSH    Hemoglobin A1c    Vitamin D 25 Hydroxy    Vitamin B12    Lipid Panel    Vitamin D deficiency    Relevant Orders    CBC & Differential    Comprehensive Metabolic Panel    TSH    Hemoglobin A1c    Vitamin D 25 Hydroxy     Vitamin B12    Lipid Panel       Nervous and Auditory    Spinal stenosis, lumbar region, with neurogenic claudication    Overview     Surgery has been recommended.  Remains under the care of neurology.  She is under the care of pain management for treatment of her chronic pain.            Musculoskeletal and Integument    HNP (herniated nucleus pulposus), lumbar    Overview     Multiple sites in the lumbar and thoracic spine            Other    Depression with anxiety    Current Assessment & Plan     Psychological condition is worsening due to chronic health conditions and worry regarding her handicapped child.  Regular aerobic exercise.  Medication changes per orders.  Declines referral to psychiatric/mental health services.  Psychological condition  will be reassessed in 4 weeks.  Add Prozac 10 mg daily.  Continue Cymbalta.  Add BuSpar 5 mg 3 times daily as needed for anxiety/panic.  Emotional support provided.  Patient denies thoughts of hurting self or others.  Discussed coping mechanisms such as exercise, meditation and relaxation.         Relevant Medications    FLUoxetine (PROzac) 10 MG capsule    busPIRone (BUSPAR) 5 MG tablet    Other Relevant Orders    CBC & Differential    Comprehensive Metabolic Panel    TSH    Hemoglobin A1c    Vitamin D 25 Hydroxy    Vitamin B12    Lipid Panel      Other Visit Diagnoses     Abnormal finding of blood chemistry, unspecified         Relevant Orders    Hemoglobin A1c          Vaccine is not available in the office today.         Patient's Body mass index is 28.12 kg/m². BMI is above normal parameters. Recommendations include: nutrition counseling and Remain under the care of bariatric services.  Have a LAP-BAND tightening as recommended per surgeon..    Encouraged patient to stop smoking      Patient is not receiving controlled medications at this office as she has been taken over by pain management.        I have discussed diagnosis in detail today allowing time for  questions and answers. Patient is aware of reasons to seek urgent or emergent medical care as well as reasons to return to the clinic for evaluation. Possible side effects, interactions and progression of symptoms discussed as well. Patient / family states understanding.   Emotional support and active listening provided.       Schedule labs to be performed on the same day she returns for medication evaluation/adjustment.  I would like to see her back in approximately 4 weeks, sooner if needed.    Dictated utilizing Dragon dictation. Errors in dictation may reflect use of voice recognition software and not all errors in transcription may have been detected prior to signing.           This document has been electronically signed by:  CHARMAINE Doran, NP-C

## 2019-10-15 NOTE — ASSESSMENT & PLAN NOTE
Psychological condition is worsening due to chronic health conditions and worry regarding her handicapped child.  Regular aerobic exercise.  Medication changes per orders.  Declines referral to psychiatric/mental health services.  Psychological condition  will be reassessed in 4 weeks.  Add Prozac 10 mg daily.  Continue Cymbalta.  Add BuSpar 5 mg 3 times daily as needed for anxiety/panic.  Emotional support provided.  Patient denies thoughts of hurting self or others.  Discussed coping mechanisms such as exercise, meditation and relaxation.

## 2019-10-21 DIAGNOSIS — E66.01 OBESITY, CLASS III, BMI 40-49.9 (MORBID OBESITY) (HCC): ICD-10-CM

## 2019-10-21 RX ORDER — AZITHROMYCIN 250 MG/1
TABLET, FILM COATED ORAL
Qty: 6 TABLET | Refills: 0 | Status: SHIPPED | OUTPATIENT
Start: 2019-10-21 | End: 2020-07-02

## 2019-10-21 NOTE — PROGRESS NOTES
Patient called and is requesting something for sinus infection.  She is unable to come in as she does not have any bad water handicapped child.  She can find a family member to run to the pharmacy.

## 2019-12-03 DIAGNOSIS — I10 ESSENTIAL HYPERTENSION: ICD-10-CM

## 2019-12-03 DIAGNOSIS — K57.20 DIVERTICULITIS OF LARGE INTESTINE WITH PERFORATION WITHOUT BLEEDING: ICD-10-CM

## 2019-12-03 DIAGNOSIS — J30.1 CHRONIC SEASONAL ALLERGIC RHINITIS DUE TO POLLEN: ICD-10-CM

## 2019-12-03 RX ORDER — SIMVASTATIN 20 MG
20 TABLET ORAL NIGHTLY
Qty: 30 TABLET | Refills: 5 | Status: SHIPPED | OUTPATIENT
Start: 2019-12-03 | End: 2020-05-27

## 2019-12-03 RX ORDER — LEVOCETIRIZINE DIHYDROCHLORIDE 5 MG/1
5 TABLET, FILM COATED ORAL EVERY EVENING
Qty: 30 TABLET | Refills: 5 | Status: SHIPPED | OUTPATIENT
Start: 2019-12-03 | End: 2020-05-27

## 2019-12-03 RX ORDER — NIZATIDINE 150 MG/1
CAPSULE ORAL
Qty: 60 CAPSULE | Refills: 5 | Status: SHIPPED | OUTPATIENT
Start: 2019-12-03 | End: 2020-01-06

## 2019-12-03 RX ORDER — LORATADINE, PSEUDOEPHEDRINE SULFATE 5; 120 MG/1; MG/1
TABLET, FILM COATED, EXTENDED RELEASE ORAL
Qty: 60 TABLET | Refills: 3 | Status: SHIPPED | OUTPATIENT
Start: 2019-12-03 | End: 2020-06-24

## 2019-12-04 DIAGNOSIS — M51.36 DDD (DEGENERATIVE DISC DISEASE), LUMBAR: ICD-10-CM

## 2019-12-04 RX ORDER — DICLOFENAC SODIUM 75 MG/1
75 TABLET, DELAYED RELEASE ORAL 2 TIMES DAILY
Qty: 60 TABLET | Refills: 5 | Status: SHIPPED | OUTPATIENT
Start: 2019-12-04 | End: 2020-05-27

## 2019-12-30 DIAGNOSIS — I10 ESSENTIAL HYPERTENSION: ICD-10-CM

## 2019-12-31 RX ORDER — LISINOPRIL 10 MG/1
10 TABLET ORAL DAILY
Qty: 30 TABLET | Refills: 5 | Status: SHIPPED | OUTPATIENT
Start: 2019-12-31 | End: 2020-06-24

## 2019-12-31 RX ORDER — VARENICLINE TARTRATE 1 MG/1
TABLET, FILM COATED ORAL
Qty: 56 TABLET | Refills: 2 | Status: SHIPPED | OUTPATIENT
Start: 2019-12-31 | End: 2020-03-25

## 2019-12-31 RX ORDER — ATENOLOL 25 MG/1
TABLET ORAL
Qty: 60 TABLET | Refills: 5 | Status: SHIPPED | OUTPATIENT
Start: 2019-12-31 | End: 2020-06-24

## 2020-01-06 ENCOUNTER — TELEPHONE (OUTPATIENT)
Dept: FAMILY MEDICINE CLINIC | Facility: CLINIC | Age: 50
End: 2020-01-06

## 2020-01-06 DIAGNOSIS — F32.9 REACTIVE DEPRESSION: ICD-10-CM

## 2020-01-06 RX ORDER — DULOXETIN HYDROCHLORIDE 60 MG/1
60 CAPSULE, DELAYED RELEASE ORAL DAILY
Qty: 30 CAPSULE | Refills: 3 | Status: SHIPPED | OUTPATIENT
Start: 2020-01-06 | End: 2020-07-02 | Stop reason: SDUPTHER

## 2020-01-06 RX ORDER — LANSOPRAZOLE 30 MG/1
30 CAPSULE, DELAYED RELEASE ORAL 2 TIMES DAILY
Qty: 30 CAPSULE | Refills: 5 | Status: SHIPPED | OUTPATIENT
Start: 2020-01-06 | End: 2020-07-02 | Stop reason: SDUPTHER

## 2020-01-06 NOTE — TELEPHONE ENCOUNTER
----- Message from CHARMAINE Dejesus sent at 1/6/2020 10:28 AM EST -----  Top Axid.  Start Prevacid 30 mg twice daily #60 with 5 refills.

## 2020-01-28 RX ORDER — MEDROXYPROGESTERONE ACETATE 150 MG/ML
INJECTION, SUSPENSION INTRAMUSCULAR
Qty: 1 ML | Refills: 3 | Status: SHIPPED | OUTPATIENT
Start: 2020-01-28 | End: 2021-02-20

## 2020-01-29 DIAGNOSIS — I10 ESSENTIAL HYPERTENSION: ICD-10-CM

## 2020-01-30 RX ORDER — HYDROCHLOROTHIAZIDE 25 MG/1
25 TABLET ORAL DAILY
Qty: 30 TABLET | Refills: 5 | Status: SHIPPED | OUTPATIENT
Start: 2020-01-30 | End: 2020-07-02 | Stop reason: SDUPTHER

## 2020-02-11 ENCOUNTER — TELEPHONE (OUTPATIENT)
Dept: FAMILY MEDICINE CLINIC | Facility: CLINIC | Age: 50
End: 2020-02-11

## 2020-02-11 NOTE — TELEPHONE ENCOUNTER
Pt stated that she was unable to come in and would call back when she could.   ----- Message from CHARMAINE Dejesus sent at 2/11/2020  2:49 PM EST -----  Needs visit, have her come in   ----- Message -----  From: Tonya Montero MA  Sent: 2/11/2020  10:38 AM EST  To: CHARMAINE Dejesus    Patient called and stated that she has a sinus infection and a ear infection. She wanted to know if you could send her in some antibiotics?

## 2020-02-26 DIAGNOSIS — K57.20 DIVERTICULITIS OF LARGE INTESTINE WITH PERFORATION WITHOUT BLEEDING: ICD-10-CM

## 2020-02-27 RX ORDER — LINACLOTIDE 290 UG/1
CAPSULE, GELATIN COATED ORAL
Qty: 30 CAPSULE | Refills: 5 | Status: SHIPPED | OUTPATIENT
Start: 2020-02-27 | End: 2020-07-02 | Stop reason: SDUPTHER

## 2020-03-25 RX ORDER — VARENICLINE TARTRATE 1 MG/1
TABLET, FILM COATED ORAL
Qty: 56 TABLET | Refills: 2 | Status: SHIPPED | OUTPATIENT
Start: 2020-03-25 | End: 2020-07-02

## 2020-05-23 DIAGNOSIS — M51.36 DDD (DEGENERATIVE DISC DISEASE), LUMBAR: ICD-10-CM

## 2020-05-23 DIAGNOSIS — I10 ESSENTIAL HYPERTENSION: ICD-10-CM

## 2020-05-27 RX ORDER — FLUOXETINE 10 MG/1
10 CAPSULE ORAL DAILY
Qty: 30 CAPSULE | Refills: 0 | Status: SHIPPED | OUTPATIENT
Start: 2020-05-27 | End: 2020-07-02 | Stop reason: SDUPTHER

## 2020-05-27 RX ORDER — BUSPIRONE HYDROCHLORIDE 5 MG/1
TABLET ORAL
Qty: 90 TABLET | Refills: 0 | Status: SHIPPED | OUTPATIENT
Start: 2020-05-27 | End: 2020-07-02 | Stop reason: SDUPTHER

## 2020-05-27 RX ORDER — LEVOCETIRIZINE DIHYDROCHLORIDE 5 MG/1
5 TABLET, FILM COATED ORAL EVERY EVENING
Qty: 30 TABLET | Refills: 5 | Status: SHIPPED | OUTPATIENT
Start: 2020-05-27 | End: 2020-07-02

## 2020-05-27 RX ORDER — DICLOFENAC SODIUM 75 MG/1
TABLET, DELAYED RELEASE ORAL
Qty: 60 TABLET | Refills: 5 | Status: SHIPPED | OUTPATIENT
Start: 2020-05-27 | End: 2020-07-02

## 2020-05-27 RX ORDER — METHOCARBAMOL 750 MG/1
TABLET, FILM COATED ORAL
Qty: 60 TABLET | Refills: 5 | Status: SHIPPED | OUTPATIENT
Start: 2020-05-27

## 2020-05-27 RX ORDER — SIMVASTATIN 20 MG
20 TABLET ORAL NIGHTLY
Qty: 30 TABLET | Refills: 5 | Status: SHIPPED | OUTPATIENT
Start: 2020-05-27 | End: 2020-07-02 | Stop reason: SDUPTHER

## 2020-05-27 RX ORDER — ESOMEPRAZOLE MAGNESIUM 40 MG/1
40 CAPSULE, DELAYED RELEASE ORAL
Qty: 30 CAPSULE | Refills: 0 | Status: SHIPPED | OUTPATIENT
Start: 2020-05-27 | End: 2020-07-02 | Stop reason: SDUPTHER

## 2020-06-04 ENCOUNTER — TELEPHONE (OUTPATIENT)
Dept: FAMILY MEDICINE CLINIC | Facility: CLINIC | Age: 50
End: 2020-06-04

## 2020-06-22 DIAGNOSIS — I10 ESSENTIAL HYPERTENSION: ICD-10-CM

## 2020-06-22 DIAGNOSIS — J30.1 CHRONIC SEASONAL ALLERGIC RHINITIS DUE TO POLLEN: ICD-10-CM

## 2020-06-22 DIAGNOSIS — E55.9 VITAMIN D DEFICIENCY: ICD-10-CM

## 2020-06-24 RX ORDER — ATENOLOL 25 MG/1
TABLET ORAL
Qty: 60 TABLET | Refills: 5 | Status: SHIPPED | OUTPATIENT
Start: 2020-06-24 | End: 2020-07-02 | Stop reason: SDUPTHER

## 2020-06-24 RX ORDER — LISINOPRIL 10 MG/1
10 TABLET ORAL DAILY
Qty: 30 TABLET | Refills: 5 | Status: SHIPPED | OUTPATIENT
Start: 2020-06-24 | End: 2020-07-02 | Stop reason: SDUPTHER

## 2020-06-24 RX ORDER — FLUTICASONE PROPIONATE 50 MCG
SPRAY, SUSPENSION (ML) NASAL
Qty: 16 G | Refills: 5 | Status: SHIPPED | OUTPATIENT
Start: 2020-06-24 | End: 2020-07-02 | Stop reason: SDUPTHER

## 2020-06-24 RX ORDER — LORATADINE, PSEUDOEPHEDRINE SULFATE 5; 120 MG/1; MG/1
TABLET, FILM COATED, EXTENDED RELEASE ORAL
Qty: 60 TABLET | Refills: 0 | Status: SHIPPED | OUTPATIENT
Start: 2020-06-24 | End: 2020-07-02 | Stop reason: SDUPTHER

## 2020-06-24 RX ORDER — ERGOCALCIFEROL 1.25 MG/1
CAPSULE ORAL
Qty: 4 CAPSULE | Refills: 5 | Status: SHIPPED | OUTPATIENT
Start: 2020-06-24 | End: 2020-07-02 | Stop reason: SDUPTHER

## 2020-06-24 RX ORDER — POTASSIUM CHLORIDE 750 MG/1
TABLET, FILM COATED, EXTENDED RELEASE ORAL
Qty: 60 TABLET | Refills: 5 | Status: SHIPPED | OUTPATIENT
Start: 2020-06-24 | End: 2020-07-02 | Stop reason: SDUPTHER

## 2020-07-02 ENCOUNTER — OFFICE VISIT (OUTPATIENT)
Dept: FAMILY MEDICINE CLINIC | Facility: CLINIC | Age: 50
End: 2020-07-02

## 2020-07-02 DIAGNOSIS — J30.1 CHRONIC SEASONAL ALLERGIC RHINITIS DUE TO POLLEN: ICD-10-CM

## 2020-07-02 DIAGNOSIS — E55.9 VITAMIN D DEFICIENCY: ICD-10-CM

## 2020-07-02 DIAGNOSIS — K57.20 DIVERTICULITIS OF LARGE INTESTINE WITH PERFORATION WITHOUT BLEEDING: ICD-10-CM

## 2020-07-02 DIAGNOSIS — R79.9 ABNORMAL FINDING OF BLOOD CHEMISTRY, UNSPECIFIED: ICD-10-CM

## 2020-07-02 DIAGNOSIS — I10 ESSENTIAL HYPERTENSION: Primary | ICD-10-CM

## 2020-07-02 DIAGNOSIS — F32.9 REACTIVE DEPRESSION: ICD-10-CM

## 2020-07-02 DIAGNOSIS — M51.36 DDD (DEGENERATIVE DISC DISEASE), LUMBAR: ICD-10-CM

## 2020-07-02 DIAGNOSIS — K21.9 GASTROESOPHAGEAL REFLUX DISEASE WITHOUT ESOPHAGITIS: ICD-10-CM

## 2020-07-02 PROCEDURE — 99442 PR PHYS/QHP TELEPHONE EVALUATION 11-20 MIN: CPT | Performed by: NURSE PRACTITIONER

## 2020-07-02 RX ORDER — DULOXETIN HYDROCHLORIDE 60 MG/1
60 CAPSULE, DELAYED RELEASE ORAL DAILY
Qty: 30 CAPSULE | Refills: 3 | Status: SHIPPED | OUTPATIENT
Start: 2020-07-02

## 2020-07-02 RX ORDER — POTASSIUM CHLORIDE 750 MG/1
TABLET, EXTENDED RELEASE ORAL
COMMUNITY
Start: 2020-06-22 | End: 2020-07-02 | Stop reason: SDUPTHER

## 2020-07-02 RX ORDER — HYDROCHLOROTHIAZIDE 25 MG/1
25 TABLET ORAL DAILY
Qty: 30 TABLET | Refills: 5 | Status: SHIPPED | OUTPATIENT
Start: 2020-07-02 | End: 2020-12-10

## 2020-07-02 RX ORDER — ESOMEPRAZOLE MAGNESIUM 40 MG/1
40 CAPSULE, DELAYED RELEASE ORAL
Qty: 30 CAPSULE | Refills: 0 | Status: SHIPPED | OUTPATIENT
Start: 2020-07-02 | End: 2020-08-18

## 2020-07-02 RX ORDER — ATENOLOL 25 MG/1
25 TABLET ORAL EVERY 12 HOURS
Qty: 60 TABLET | Refills: 5 | Status: SHIPPED | OUTPATIENT
Start: 2020-07-02 | End: 2020-12-10

## 2020-07-02 RX ORDER — POTASSIUM CHLORIDE 750 MG/1
10 TABLET, FILM COATED, EXTENDED RELEASE ORAL 2 TIMES DAILY
Qty: 60 TABLET | Refills: 5 | Status: SHIPPED | OUTPATIENT
Start: 2020-07-02 | End: 2020-12-10

## 2020-07-02 RX ORDER — FLUTICASONE PROPIONATE 50 MCG
1 SPRAY, SUSPENSION (ML) NASAL DAILY
Qty: 16 G | Refills: 5 | Status: SHIPPED | OUTPATIENT
Start: 2020-07-02 | End: 2020-12-10

## 2020-07-02 RX ORDER — SIMVASTATIN 20 MG
20 TABLET ORAL NIGHTLY
Qty: 30 TABLET | Refills: 5 | Status: SHIPPED | OUTPATIENT
Start: 2020-07-02

## 2020-07-02 RX ORDER — LISINOPRIL 10 MG/1
10 TABLET ORAL DAILY
Qty: 30 TABLET | Refills: 5 | Status: SHIPPED | OUTPATIENT
Start: 2020-07-02 | End: 2020-12-10

## 2020-07-02 RX ORDER — ETODOLAC 400 MG/1
TABLET, FILM COATED ORAL
COMMUNITY
Start: 2020-06-23 | End: 2020-09-17

## 2020-07-02 RX ORDER — FLUOXETINE 10 MG/1
10 CAPSULE ORAL DAILY
Qty: 30 CAPSULE | Refills: 0 | Status: SHIPPED | OUTPATIENT
Start: 2020-07-02 | End: 2020-07-21

## 2020-07-02 RX ORDER — LANSOPRAZOLE 30 MG/1
30 CAPSULE, DELAYED RELEASE ORAL 2 TIMES DAILY
Qty: 30 CAPSULE | Refills: 5 | Status: SHIPPED | OUTPATIENT
Start: 2020-07-02

## 2020-07-02 RX ORDER — OXYCODONE AND ACETAMINOPHEN 10; 325 MG/1; MG/1
TABLET ORAL
COMMUNITY
Start: 2020-06-23

## 2020-07-02 RX ORDER — BUSPIRONE HYDROCHLORIDE 5 MG/1
5 TABLET ORAL 3 TIMES DAILY
Qty: 90 TABLET | Refills: 5 | Status: SHIPPED | OUTPATIENT
Start: 2020-07-02 | End: 2020-07-21

## 2020-07-02 RX ORDER — ERGOCALCIFEROL 1.25 MG/1
50000 CAPSULE ORAL WEEKLY
Qty: 4 CAPSULE | Refills: 5 | Status: SHIPPED | OUTPATIENT
Start: 2020-07-02 | End: 2020-12-10

## 2020-07-02 NOTE — PROGRESS NOTES
Establish patient called office of APRN today to discuss:   CC    constipation   GERD  Chronic pain     Patient request telephone visit due to high risk for coronavirus personally and leaving with a high risk family member.  You have chosen to receive care through a telephone visit today. Do you consent to use a telephone visit for your medical care today? Yes       Brief HPI/ROS obtained as follows:  htn - checks often, doing well.     gerd - much improved.  Continues to take PPI and H2 blocker.  Has had bariatric surgery.    Constipation -patient receives Colace which she needs samples of because her insurance will not cover, takes Linzess.  At times continues to have some constipation and may have to drink some MiraLAX.  Has a chronic spinal issues which help contribute to her constipation.    Chronic pain - under the care of a pain clinic. Is having better pain control.  Reports with medication changes and frequent spinal injections her pain is much better controlled and she has a better quality of life.  Currently taking Percocet and gabapentin.  No history of substance use or addiction.    Depression with anxiety-patient is the sole care giver for her disabled child.  This is very stressful on her.  Reports her symptoms are stable and controlled on Prozac.  Receives Cymbalta for her chronic pain which is also helpful with some of her depressive symptoms.  Patient is aware of possible side effects such as her tone and syndrome.  She monitors closely for any symptoms.      The following portions of the patient's history, chief complaint and ROS were reviewed and updated as appropriate per provider:  Allergies, current medications, past family history, past medical history, past social history, past surgical history and problem list.      Review of Systems   Constitutional: Negative for activity change, appetite change, fatigue and fever.   HENT: Negative for congestion, sinus pressure, sinus pain, sneezing, sore  throat and trouble swallowing.    Eyes: Negative.    Respiratory: Negative for cough, chest tightness and shortness of breath.    Cardiovascular: Negative for chest pain and palpitations.   Gastrointestinal: Positive for constipation. Negative for abdominal pain, blood in stool, diarrhea and rectal pain.   Endocrine: Negative.    Genitourinary: Negative for dysuria, flank pain and frequency.   Musculoskeletal: Positive for arthralgias, back pain, myalgias and neck pain. Negative for neck stiffness.   Skin: Negative.    Allergic/Immunologic: Negative for environmental allergies, food allergies and immunocompromised state.   Neurological: Negative for seizures, syncope, facial asymmetry and light-headedness.   Hematological: Negative.    Psychiatric/Behavioral: Negative for behavioral problems, decreased concentration, self-injury and suicidal ideas.       The current allergy list and medication list was reviewed with patient for accuracy.     Assessment   Alert and oriented x3.  Respirations not labored with conversation.  No cough.  Speech normal.  Hearing adequate.  Cooperative.  Mood normal.  Thought process normal.    Diagnoses and all orders for this visit:    Essential hypertension  -     hydroCHLOROthiazide (HYDRODIURIL) 25 MG tablet; Take 1 tablet by mouth Daily.  -     simvastatin (ZOCOR) 20 MG tablet; Take 1 tablet by mouth Every Night.  -     lisinopril (PRINIVIL,ZESTRIL) 10 MG tablet; Take 1 tablet by mouth Daily.  -     atenolol (TENORMIN) 25 MG tablet; Take 1 tablet by mouth Every 12 (Twelve) Hours.  -     potassium chloride (K-DUR) 10 MEQ CR tablet; Take 1 tablet by mouth 2 (Two) Times a Day.  -     CBC & Differential; Future  -     Comprehensive Metabolic Panel; Future  -     TSH; Future  -     Hemoglobin A1c; Future  -     Vitamin D 25 Hydroxy; Future  -     Vitamin B12; Future  -     Lipid Panel; Future  -     MicroAlbumin, Urine, Random - Urine, Clean Catch; Future    DDD (degenerative disc  disease), lumbar  -     CBC & Differential; Future  -     Comprehensive Metabolic Panel; Future  -     TSH; Future  -     Hemoglobin A1c; Future  -     Vitamin D 25 Hydroxy; Future  -     Vitamin B12; Future  -     Lipid Panel; Future  -     MicroAlbumin, Urine, Random - Urine, Clean Catch; Future    Gastroesophageal reflux disease without esophagitis  -     CBC & Differential; Future  -     Comprehensive Metabolic Panel; Future  -     TSH; Future  -     Hemoglobin A1c; Future  -     Vitamin D 25 Hydroxy; Future  -     Vitamin B12; Future  -     Lipid Panel; Future  -     MicroAlbumin, Urine, Random - Urine, Clean Catch; Future    Reactive depression  -     DULoxetine (CYMBALTA) 60 MG capsule; Take 1 capsule by mouth Daily.  -     CBC & Differential; Future  -     Comprehensive Metabolic Panel; Future  -     TSH; Future  -     Hemoglobin A1c; Future  -     Vitamin D 25 Hydroxy; Future  -     Vitamin B12; Future  -     Lipid Panel; Future  -     MicroAlbumin, Urine, Random - Urine, Clean Catch; Future    Chronic seasonal allergic rhinitis due to pollen  Comments:  symptomatic treatment, steam therapy, fluids, stop smoking.   Orders:  -     loratadine-pseudoephedrine (Alavert Allergy/Sinus) 5-120 MG per 12 hr tablet; Take 1 tablet by mouth 2 (Two) Times a Day.  -     fluticasone (FLONASE) 50 MCG/ACT nasal spray; 1 spray by Each Nare route Daily.  -     CBC & Differential; Future  -     Comprehensive Metabolic Panel; Future  -     TSH; Future  -     Hemoglobin A1c; Future  -     Vitamin D 25 Hydroxy; Future  -     Vitamin B12; Future  -     Lipid Panel; Future  -     MicroAlbumin, Urine, Random - Urine, Clean Catch; Future    Diverticulitis of large intestine with perforation without bleeding  -     linaclotide (Linzess) 290 MCG capsule capsule; Take 1 capsule by mouth Every Morning Before Breakfast.  -     CBC & Differential; Future  -     Comprehensive Metabolic Panel; Future  -     TSH; Future  -     Hemoglobin A1c;  Future  -     Vitamin D 25 Hydroxy; Future  -     Vitamin B12; Future  -     Lipid Panel; Future  -     MicroAlbumin, Urine, Random - Urine, Clean Catch; Future    Vitamin D deficiency  -     vitamin D (ERGOCALCIFEROL) 1.25 MG (78166 UT) capsule capsule; Take 1 capsule by mouth 1 (One) Time Per Week.  -     CBC & Differential; Future  -     Comprehensive Metabolic Panel; Future  -     TSH; Future  -     Hemoglobin A1c; Future  -     Vitamin D 25 Hydroxy; Future  -     Vitamin B12; Future  -     Lipid Panel; Future  -     MicroAlbumin, Urine, Random - Urine, Clean Catch; Future    Abnormal finding of blood chemistry, unspecified   -     Hemoglobin A1c; Future    Other orders  -     esomeprazole (nexIUM) 40 MG capsule; Take 1 capsule by mouth Every Morning Before Breakfast.  -     lansoprazole (Prevacid) 30 MG capsule; Take 1 capsule by mouth 2 (Two) Times a Day.  -     FLUoxetine (PROzac) 10 MG capsule; Take 1 capsule by mouth Daily.  -     busPIRone (BUSPAR) 5 MG tablet; Take 1 tablet by mouth 3 (Three) Times a Day.        Current Outpatient Medications:   •  atenolol (TENORMIN) 25 MG tablet, Take 1 tablet by mouth Every 12 (Twelve) Hours., Disp: 60 tablet, Rfl: 5  •  busPIRone (BUSPAR) 5 MG tablet, Take 1 tablet by mouth 3 (Three) Times a Day., Disp: 90 tablet, Rfl: 5  •  DULoxetine (CYMBALTA) 60 MG capsule, Take 1 capsule by mouth Daily., Disp: 30 capsule, Rfl: 3  •  esomeprazole (nexIUM) 40 MG capsule, Take 1 capsule by mouth Every Morning Before Breakfast., Disp: 30 capsule, Rfl: 0  •  FLUoxetine (PROzac) 10 MG capsule, Take 1 capsule by mouth Daily., Disp: 30 capsule, Rfl: 0  •  fluticasone (FLONASE) 50 MCG/ACT nasal spray, 1 spray by Each Nare route Daily., Disp: 16 g, Rfl: 5  •  gabapentin (NEURONTIN) 600 MG tablet, Take 1 tablet by mouth 3 (Three) Times a Day., Disp: 45 tablet, Rfl: 0  •  hydroCHLOROthiazide (HYDRODIURIL) 25 MG tablet, Take 1 tablet by mouth Daily., Disp: 30 tablet, Rfl: 5  •  lansoprazole  (Prevacid) 30 MG capsule, Take 1 capsule by mouth 2 (Two) Times a Day., Disp: 30 capsule, Rfl: 5  •  linaclotide (Linzess) 290 MCG capsule capsule, Take 1 capsule by mouth Every Morning Before Breakfast., Disp: 30 capsule, Rfl: 5  •  lisinopril (PRINIVIL,ZESTRIL) 10 MG tablet, Take 1 tablet by mouth Daily., Disp: 30 tablet, Rfl: 5  •  loratadine-pseudoephedrine (Alavert Allergy/Sinus) 5-120 MG per 12 hr tablet, Take 1 tablet by mouth 2 (Two) Times a Day., Disp: 60 tablet, Rfl: 5  •  medroxyPROGESTERone Acetate 150 MG/ML suspension prefilled syringe, INJECT 1ML INTO THE SHOULDER,THIGH OR BUTTOCKS EVERY 3 MONTHS, Disp: 1 mL, Rfl: 3  •  methocarbamol (ROBAXIN) 750 MG tablet, TAKE ONE TABLET BY MOUTH TWO TIMES A DAY, Disp: 60 tablet, Rfl: 5  •  Multiple Vitamins-Minerals (MULTI-VITAMIN GUMMIES) chewable tablet, Chew 2 each Daily., Disp: 100 tablet, Rfl: 5  •  potassium chloride (K-DUR) 10 MEQ CR tablet, Take 1 tablet by mouth 2 (Two) Times a Day., Disp: 60 tablet, Rfl: 5  •  simvastatin (ZOCOR) 20 MG tablet, Take 1 tablet by mouth Every Night., Disp: 30 tablet, Rfl: 5  •  vitamin D (ERGOCALCIFEROL) 1.25 MG (05427 UT) capsule capsule, Take 1 capsule by mouth 1 (One) Time Per Week., Disp: 4 capsule, Rfl: 5  •  etodolac (LODINE) 400 MG tablet, , Disp: , Rfl:   •  oxyCODONE-acetaminophen (PERCOCET)  MG per tablet, , Disp: , Rfl:     Current Facility-Administered Medications:   •  cyanocobalamin injection 1,000 mcg, 1,000 mcg, Intramuscular, Q28 Days, Jaleesa Contreras APRN, 1,000 mcg at 10/15/19 0924    Refill routine medications.  Refill routine medications.  Reviewed possible side effects, interaction and reasons to stop medication and or seek immediate medical attention/advice.    Pt has been instructed today regarding low fat heart smart diet. Weight management and routine exercise has been recommended. Avoid high fat foods, starchy foods and processed foods. Increase lean meats, fresh vegetables and  fresh fruits.     Emotional support and active listening provided.   I have discussed diagnosis in detail today allowing time for questions and answers. Pt is aware of reasons to seek urgent or emergent medical care as well as reasons to return to the clinic for evaluation. Possible side effects, interactions and progression of symptoms discussed as well. Pt / family states understanding.     Coronavirus precautions have been reviewed and discussed.  I have discussed the CDC recommendations  of social distancing, hand washing and disinfecting commonly touched items. Reviewed need to notify PCP and self quarantine with mild symptoms.  Discussed procedure to obtain Covid-19 testing and notification of PCP/health dept/ED/Urgent Center if symptoms begin. Understanding verbalized.        Patient instructed and advised to call if symptoms are increasing or new symptoms occur.    Understands reasons for urgent and emergent care.  Patient (& family) verbalized agreement for treatment plan.       Follow-up in 3-6 months, sooner if needed.  Routine medication refills have been provided.  Remain under the care of pain management.  I would like to have fasting labs 1 week prior to her next appointment.  If coronavirus cases are continuing to elevate in this region we we will do a telehealth visit for her next follow-up as well.      This visit has been rescheduled as a phone visit to comply with patient safety concerns in accordance with CDC recommendations. Total time of discussion was 18 minutes.

## 2020-07-21 RX ORDER — POTASSIUM CHLORIDE 750 MG/1
TABLET, EXTENDED RELEASE ORAL
Qty: 60 TABLET | Refills: 5 | Status: SHIPPED | OUTPATIENT
Start: 2020-07-21 | End: 2020-09-17

## 2020-07-21 RX ORDER — BUSPIRONE HYDROCHLORIDE 5 MG/1
TABLET ORAL
Qty: 90 TABLET | Refills: 5 | Status: SHIPPED | OUTPATIENT
Start: 2020-07-21 | End: 2022-04-21

## 2020-07-21 RX ORDER — FLUOXETINE 10 MG/1
10 CAPSULE ORAL DAILY
Qty: 30 CAPSULE | Refills: 5 | Status: SHIPPED | OUTPATIENT
Start: 2020-07-21 | End: 2021-02-06

## 2020-08-18 RX ORDER — ESOMEPRAZOLE MAGNESIUM 40 MG/1
40 CAPSULE, DELAYED RELEASE ORAL
Qty: 30 CAPSULE | Refills: 5 | Status: SHIPPED | OUTPATIENT
Start: 2020-08-18 | End: 2021-03-04

## 2020-09-17 ENCOUNTER — OFFICE VISIT (OUTPATIENT)
Dept: FAMILY MEDICINE CLINIC | Facility: CLINIC | Age: 50
End: 2020-09-17

## 2020-09-17 VITALS — HEIGHT: 65 IN | RESPIRATION RATE: 18 BRPM | WEIGHT: 170 LBS | BODY MASS INDEX: 28.32 KG/M2

## 2020-09-17 DIAGNOSIS — M48.062 SPINAL STENOSIS, LUMBAR REGION, WITH NEUROGENIC CLAUDICATION: Primary | ICD-10-CM

## 2020-09-17 DIAGNOSIS — Z98.84 BARIATRIC SURGERY STATUS: ICD-10-CM

## 2020-09-17 DIAGNOSIS — R93.3 ABNORMAL FINDINGS ON DIAGNOSTIC IMAGING OF OTHER PARTS OF DIGESTIVE TRACT: ICD-10-CM

## 2020-09-17 DIAGNOSIS — M51.36 DDD (DEGENERATIVE DISC DISEASE), LUMBAR: ICD-10-CM

## 2020-09-17 DIAGNOSIS — D51.9 ANEMIA DUE TO VITAMIN B12 DEFICIENCY, UNSPECIFIED B12 DEFICIENCY TYPE: ICD-10-CM

## 2020-09-17 DIAGNOSIS — E55.9 VITAMIN D DEFICIENCY: ICD-10-CM

## 2020-09-17 DIAGNOSIS — R53.83 FATIGUE, UNSPECIFIED TYPE: ICD-10-CM

## 2020-09-17 DIAGNOSIS — R79.9 ABNORMAL FINDING OF BLOOD CHEMISTRY, UNSPECIFIED: ICD-10-CM

## 2020-09-17 PROCEDURE — 99214 OFFICE O/P EST MOD 30 MIN: CPT | Performed by: NURSE PRACTITIONER

## 2020-09-17 RX ORDER — ETODOLAC 400 MG/1
400 TABLET, FILM COATED ORAL 3 TIMES DAILY
COMMUNITY

## 2020-09-17 RX ORDER — LEVOCETIRIZINE DIHYDROCHLORIDE 5 MG/1
TABLET, FILM COATED ORAL
COMMUNITY
Start: 2020-08-18 | End: 2020-12-09

## 2020-09-17 NOTE — PROGRESS NOTES
This was an audio and video enabled telemedicine encounter.         Subjective   Mirian Fischer is a 50 y.o. female.     No chief complaint on file.    CC  Chronic pain  Fatigue     History of Present Illness:  Chronic pain-under the care of a pain clinic in [place].  Patient has a handicapped child that she is solely responsible for.  Does not have a family members to assist with taking care of her totally disabled child.  It is very difficult for her to find someone to watch her child to go to the pain clinic.  Patient also has severe multilevel degenerative disc disease with facet arthropathy and congenital shortening of the pedicles resulting in generalized central canal stenosis in her lumbar spine.  Is extremely painful for her to drive to [place] that she has no one to drive her.  Her legs go numb while she is driving.  Her pain physician is asking if primary care would be willing to write her pain scripts and the patient continue to see her pain clinic/neurologist every 6 months instead of monthly.  Patient currently receives oxycodone and gabapentin.  She does report that this provides her with some pain relief.  She rates her daily pain around 8 on pain scale rating 0-10.  She does not leave the house except for medical appointments.      Fatigue- patient reports that she has been very tired.  No recent labs.  Not had her A1c or routine labs in over a year due to not having childcare.  She has been requesting tele-visits for safety due to coronavirus pandemic.    Overweight- BMI 28.  Maintaining weight around 170 pounds.  She has had a LAP-BAND.  Patient has maintained her weight loss well.      The following portions of the patient's history and ROS were reviewed and updated as appropriate per provider:  Allergies, current medications, past family history, past medical history, past social history, past surgical history and problem list.    Review of Systems   Constitutional: Positive for  "fatigue. Negative for appetite change, chills, fever and unexpected weight change.   HENT: Negative for congestion, sinus pressure, sinus pain and sore throat.    Eyes: Negative for pain, discharge and itching.   Respiratory: Negative for cough, chest tightness and shortness of breath.    Cardiovascular: Negative.    Gastrointestinal: Positive for constipation (Chronic) and nausea. Negative for abdominal pain.   Endocrine: Negative.    Genitourinary: Negative for difficulty urinating, dysuria and flank pain.   Musculoskeletal: Positive for arthralgias, back pain and myalgias.   Skin: Negative.    Allergic/Immunologic: Positive for environmental allergies. Negative for food allergies and immunocompromised state.   Neurological: Positive for weakness and numbness (Lower extremities). Negative for facial asymmetry and speech difficulty.   Hematological: Negative.    Psychiatric/Behavioral: Negative for dysphoric mood, self-injury and suicidal ideas.       Objective     Resp 18   Ht 165.1 cm (65\")   Wt 77.1 kg (170 lb)   BMI 28.29 kg/m²   Office Visit on 06/19/2019   Component Date Value Ref Range Status   • WBC 06/19/2019 9.92  3.40 - 10.80 10*3/mm3 Final   • RBC 06/19/2019 4.53  3.77 - 5.28 10*6/mm3 Final   • Hemoglobin 06/19/2019 12.3  12.0 - 15.9 g/dL Final   • Hematocrit 06/19/2019 39.8  34.0 - 46.6 % Final   • MCV 06/19/2019 87.9  79.0 - 97.0 fL Final   • MCH 06/19/2019 27.2  26.6 - 33.0 pg Final   • MCHC 06/19/2019 30.9* 31.5 - 35.7 g/dL Final   • RDW 06/19/2019 14.8  12.3 - 15.4 % Final   • Platelets 06/19/2019 267  140 - 450 10*3/mm3 Final   • Neutrophil Rel % 06/19/2019 57.1  42.7 - 76.0 % Final   • Lymphocyte Rel % 06/19/2019 31.8  19.6 - 45.3 % Final   • Monocyte Rel % 06/19/2019 6.7  5.0 - 12.0 % Final   • Eosinophil Rel % 06/19/2019 3.1  0.3 - 6.2 % Final   • Basophil Rel % 06/19/2019 0.8  0.0 - 1.5 % Final   • Neutrophils Absolute 06/19/2019 5.67  1.70 - 7.00 10*3/mm3 Final   • Lymphocytes Absolute " 06/19/2019 3.15* 0.70 - 3.10 10*3/mm3 Final   • Monocytes Absolute 06/19/2019 0.66  0.10 - 0.90 10*3/mm3 Final   • Eosinophils Absolute 06/19/2019 0.31  0.00 - 0.40 10*3/mm3 Final   • Basophils Absolute 06/19/2019 0.08  0.00 - 0.20 10*3/mm3 Final   • Immature Granulocyte Rel % 06/19/2019 0.5  0.0 - 0.5 % Final   • Immature Grans Absolute 06/19/2019 0.05  0.00 - 0.05 10*3/mm3 Final   • nRBC 06/19/2019 0.0  0.0 - 0.2 /100 WBC Final   • Glucose 06/19/2019 106* 65 - 99 mg/dL Final   • BUN 06/19/2019 4* 6 - 20 mg/dL Final   • Creatinine 06/19/2019 0.49* 0.57 - 1.00 mg/dL Final   • eGFR Non  Am 06/19/2019 134  >60 mL/min/1.73 Final   • eGFR African Am 06/19/2019 >150  >60 mL/min/1.73 Final   • BUN/Creatinine Ratio 06/19/2019 8.2  7.0 - 25.0 Final   • Sodium 06/19/2019 135* 136 - 145 mmol/L Final   • Potassium 06/19/2019 4.3  3.5 - 5.2 mmol/L Final   • Chloride 06/19/2019 99  98 - 107 mmol/L Final   • Total CO2 06/19/2019 24.4  22.0 - 29.0 mmol/L Final   • Calcium 06/19/2019 8.8  8.6 - 10.5 mg/dL Final   • Total Protein 06/19/2019 6.2  6.0 - 8.5 g/dL Final   • Albumin 06/19/2019 4.40  3.50 - 5.20 g/dL Final   • Globulin 06/19/2019 1.8  gm/dL Final   • A/G Ratio 06/19/2019 2.4  g/dL Final   • Total Bilirubin 06/19/2019 <0.2* 0.2 - 1.2 mg/dL Final   • Alkaline Phosphatase 06/19/2019 59  39 - 117 U/L Final   • AST (SGOT) 06/19/2019 11  1 - 32 U/L Final   • ALT (SGPT) 06/19/2019 16  1 - 33 U/L Final   • Creatinine, Urine 06/19/2019 101.2  Not Estab. mg/dL Final   • Microalbumin, Urine 06/19/2019 4.6  Not Estab. ug/mL Final   • Microalbumin/Creatinine Ratio 06/19/2019 4.5  0.0 - 30.0 mg/g creat Final   • Vitamin B-12 06/19/2019 721  211 - 946 pg/mL Final   • 25 Hydroxy, Vitamin D 06/19/2019 30.7  30.0 - 100.0 ng/ml Final   • Hemoglobin A1C 06/19/2019 5.75* 4.80 - 5.60 % Final   • TSH 06/19/2019 0.952  0.450 - 4.500 uIU/mL Final   • Total Cholesterol 06/19/2019 160  0 - 200 mg/dL Final   • Triglycerides 06/19/2019 88  0 -  150 mg/dL Final   • HDL Cholesterol 06/19/2019 53  40 - 60 mg/dL Final   • VLDL Cholesterol 06/19/2019 17.6  mg/dL Final   • LDL Cholesterol  06/19/2019 89  0 - 100 mg/dL Final       Physical Exam  Vitals signs reviewed.   Constitutional:       General: She is not in acute distress.     Appearance: Normal appearance. She is obese. She is not ill-appearing, toxic-appearing or diaphoretic.   HENT:      Head: Atraumatic.      Right Ear: External ear normal.      Left Ear: External ear normal.      Nose: Nose normal.      Mouth/Throat:      Comments: Lips pink and moist  Eyes:      General:         Right eye: No discharge.         Left eye: No discharge.      Conjunctiva/sclera: Conjunctivae normal.   Neck:      Musculoskeletal: Normal range of motion.   Pulmonary:      Effort: Pulmonary effort is normal. No accessory muscle usage or respiratory distress.   Musculoskeletal:      Comments: Sitting in her home   Skin:     Coloration: Skin is not jaundiced or pale.      Findings: No erythema.   Neurological:      Mental Status: She is alert and oriented to person, place, and time.   Psychiatric:         Mood and Affect: Mood normal.         Behavior: Behavior normal.         Thought Content: Thought content normal.         Judgment: Judgment normal.         Assessment/Plan     Problem List Items Addressed This Visit        Digestive    Anemia due to vitamin B12 deficiency    Relevant Orders    CBC & Differential    Comprehensive Metabolic Panel    TSH    Hemoglobin A1c    Vitamin D 25 Hydroxy    Vitamin B12    Lipid Panel    MicroAlbumin, Urine, Random - Urine, Clean Catch    Vitamin D deficiency    Relevant Orders    CBC & Differential    Comprehensive Metabolic Panel    TSH    Hemoglobin A1c    Vitamin D 25 Hydroxy    Vitamin B12    Lipid Panel    MicroAlbumin, Urine, Random - Urine, Clean Catch       Nervous and Auditory    Spinal stenosis, lumbar region, with neurogenic claudication - Primary    Overview     Surgery  has been recommended.  Remains under the care of neurology.  She is under the care of pain management for treatment of her chronic pain.         Relevant Orders    Ambulatory Referral to Pain Management       Musculoskeletal and Integument    DDD (degenerative disc disease), lumbar    Overview     1/4/2019 MRI Lumbar Spine findings  Severe multilevel degenerative disc disease in combination with facet  arthropathy and congenital shortening of the pedicles resulting in  generalized central canal stenosis detailed above.  2. Superimposed disc herniations at L1/2 and L5/S1 contributing to  central canal stenosis and producing mass effect on the central nerve  roots.  3. Severe right neural foraminal stenosis L5/S1 with mass effect on  exiting L5 nerve root laterally.  4. Degenerative type spondylolisthesis anteriorly 8 mm of L4 on L5.  5. Hypertrophic facet arthropathy with periarticular inflammation of  L3/4 and L4/5.               Other    Bariatric surgery status    Relevant Orders    CBC & Differential    Comprehensive Metabolic Panel    TSH    Hemoglobin A1c    Vitamin D 25 Hydroxy    Vitamin B12    Lipid Panel    MicroAlbumin, Urine, Random - Urine, Clean Catch      Other Visit Diagnoses     Fatigue, unspecified type        Relevant Orders    CBC & Differential    Comprehensive Metabolic Panel    TSH    Hemoglobin A1c    Vitamin D 25 Hydroxy    Vitamin B12    Lipid Panel    MicroAlbumin, Urine, Random - Urine, Clean Catch    Abnormal finding of blood chemistry, unspecified         Relevant Orders    Hemoglobin A1c    Abnormal findings on diagnostic imaging of other parts of digestive tract         Relevant Orders    Lipid Panel          I will refer the patient to a local pain clinic.  She has been compliant with all her years in our practice.  Emotional support provided regarding her current situation.  Explained to patient that due to being a nurse practitioner I am unable to prescribe her oxycodone.  I would  like her to come in and have some fasting labs.  We also need to get her Medicare wellness exam scheduled.         Patient's Body mass index is 28.29 kg/m². BMI is above normal parameters. Recommendations include: exercise counseling and nutrition counseling.    Mirian Fischer  reports that she has been smoking cigarettes. She has a 45.00 pack-year smoking history. She has never used smokeless tobacco.. I have educated her on the risk of diseases from using tobacco products such as cancer, COPD and heart diease.     I advised her to quit and she is not willing to quit.    I spent 3  minutes counseling the patient.    Coronavirus precautions have been reviewed and discussed.  I have discussed the CDC recommendations  of social distancing, hand washing, wearing mask and disinfecting commonly touched items. Reviewed need to notify PCP and self quarantine with mild symptoms.  Discussed procedure to obtain Covid-19 testing and notification of PCP/health dept/ED/Urgent Center if symptoms begin. Understanding verbalized.    I have discussed diagnosis in detail today allowing time for questions and answers. Patient is aware of reasons to seek urgent or emergent medical care as well as reasons to return to the clinic for evaluation. Possible side effects, interactions and progression of symptoms discussed as well. Patient / family states understanding.   Emotional support and active listening provided.     Follow-up in 4-6 weeks, sooner if needed.  Come in next week for fasting labs and influenza vaccine.    I spent 4 minutes on the phone with the patient prior to visual connection.  I spent another 10 minutes doing an audio/visual visit.      This document has been electronically signed by:  CHARMAINE Doran, NP-C

## 2020-10-22 ENCOUNTER — OFFICE VISIT (OUTPATIENT)
Dept: FAMILY MEDICINE CLINIC | Facility: CLINIC | Age: 50
End: 2020-10-22

## 2020-10-22 DIAGNOSIS — J01.00 ACUTE NON-RECURRENT MAXILLARY SINUSITIS: Primary | ICD-10-CM

## 2020-10-22 PROCEDURE — 99442 PR PHYS/QHP TELEPHONE EVALUATION 11-20 MIN: CPT | Performed by: NURSE PRACTITIONER

## 2020-10-22 PROCEDURE — G2025 DIS SITE TELE SVCS RHC/FQHC: HCPCS | Performed by: NURSE PRACTITIONER

## 2020-10-22 RX ORDER — AMOXICILLIN AND CLAVULANATE POTASSIUM 875; 125 MG/1; MG/1
1 TABLET, FILM COATED ORAL 2 TIMES DAILY
Qty: 20 TABLET | Refills: 0 | Status: SHIPPED | OUTPATIENT
Start: 2020-10-22 | End: 2020-11-01

## 2020-10-22 RX ORDER — DICLOFENAC SODIUM 75 MG/1
TABLET, DELAYED RELEASE ORAL
COMMUNITY
Start: 2020-10-15 | End: 2020-12-09

## 2020-10-22 RX ORDER — BUSPIRONE HYDROCHLORIDE 10 MG/1
TABLET ORAL
COMMUNITY
Start: 2020-10-15 | End: 2020-12-10

## 2020-10-22 NOTE — PROGRESS NOTES
You have chosen to receive care through a telephone visit. Do you consent to use a telephone visit for your medical care today? Yes    Mirian Fischer is a 50 y.o. female who is c/o an upper respiratory symptoms which started appx one week ago and worsening.      URI   The current episode started in the past 7 days. The problem has been rapidly worsening. Maximum temperature: Maybe. Associated symptoms include congestion, coughing, ear pain, headaches, rhinorrhea, sinus pain and a sore throat. Pertinent negatives include no nausea, rash, sneezing, vomiting or wheezing. Treatments tried: Flonase. The treatment provided no relief.      The following portions of the patient's history were reviewed and updated as appropriate: allergies, current medications, past family history, past medical history, past social history, past surgical history and problem list.    Current Outpatient Medications:   •  amoxicillin-clavulanate (Augmentin) 875-125 MG per tablet, Take 1 tablet by mouth 2 (Two) Times a Day for 10 days., Disp: 20 tablet, Rfl: 0  •  atenolol (TENORMIN) 25 MG tablet, Take 1 tablet by mouth Every 12 (Twelve) Hours., Disp: 60 tablet, Rfl: 5  •  busPIRone (BUSPAR) 10 MG tablet, , Disp: , Rfl:   •  busPIRone (BUSPAR) 5 MG tablet, TAKE ONE TABLET BY MOUTH THREE TIMES A DAY AS NEEDED FOR PANIC ATTACK/ANXIETY, Disp: 90 tablet, Rfl: 5  •  diclofenac (VOLTAREN) 75 MG EC tablet, , Disp: , Rfl:   •  DULoxetine (CYMBALTA) 60 MG capsule, Take 1 capsule by mouth Daily., Disp: 30 capsule, Rfl: 3  •  esomeprazole (nexIUM) 40 MG capsule, TAKE 1 CAPSULE BY MOUTH EVERY MORNING BEFORE BREAKFAST., Disp: 30 capsule, Rfl: 5  •  etodolac (LODINE) 400 MG tablet, Take 400 mg by mouth 3 (Three) Times a Day., Disp: , Rfl:   •  FLUoxetine (PROzac) 10 MG capsule, TAKE 1 CAPSULE BY MOUTH DAILY., Disp: 30 capsule, Rfl: 5  •  fluticasone (FLONASE) 50 MCG/ACT nasal spray, 1 spray by Each Nare route Daily., Disp: 16 g, Rfl: 5  •  gabapentin  (NEURONTIN) 600 MG tablet, Take 1 tablet by mouth 3 (Three) Times a Day., Disp: 45 tablet, Rfl: 0  •  hydroCHLOROthiazide (HYDRODIURIL) 25 MG tablet, Take 1 tablet by mouth Daily., Disp: 30 tablet, Rfl: 5  •  lansoprazole (Prevacid) 30 MG capsule, Take 1 capsule by mouth 2 (Two) Times a Day., Disp: 30 capsule, Rfl: 5  •  levocetirizine (XYZAL) 5 MG tablet, , Disp: , Rfl:   •  linaclotide (Linzess) 290 MCG capsule capsule, Take 1 capsule by mouth Every Morning Before Breakfast., Disp: 30 capsule, Rfl: 5  •  lisinopril (PRINIVIL,ZESTRIL) 10 MG tablet, Take 1 tablet by mouth Daily., Disp: 30 tablet, Rfl: 5  •  loratadine-pseudoephedrine (Alavert Allergy/Sinus) 5-120 MG per 12 hr tablet, Take 1 tablet by mouth 2 (Two) Times a Day., Disp: 60 tablet, Rfl: 5  •  medroxyPROGESTERone Acetate 150 MG/ML suspension prefilled syringe, INJECT 1ML INTO THE SHOULDER,THIGH OR BUTTOCKS EVERY 3 MONTHS, Disp: 1 mL, Rfl: 3  •  methocarbamol (ROBAXIN) 750 MG tablet, TAKE ONE TABLET BY MOUTH TWO TIMES A DAY, Disp: 60 tablet, Rfl: 5  •  Multiple Vitamins-Minerals (MULTI-VITAMIN GUMMIES) chewable tablet, Chew 2 each Daily., Disp: 100 tablet, Rfl: 5  •  oxyCODONE-acetaminophen (PERCOCET)  MG per tablet, , Disp: , Rfl:   •  potassium chloride (K-DUR) 10 MEQ CR tablet, Take 1 tablet by mouth 2 (Two) Times a Day., Disp: 60 tablet, Rfl: 5  •  simvastatin (ZOCOR) 20 MG tablet, Take 1 tablet by mouth Every Night., Disp: 30 tablet, Rfl: 5  •  vitamin D (ERGOCALCIFEROL) 1.25 MG (25603 UT) capsule capsule, Take 1 capsule by mouth 1 (One) Time Per Week., Disp: 4 capsule, Rfl: 5    Current Facility-Administered Medications:   •  cyanocobalamin injection 1,000 mcg, 1,000 mcg, Intramuscular, Q28 Days, Jaleesa Contreras APRN, 1,000 mcg at 10/15/19 0924    No Known Allergies    Review of Systems   Constitutional: Positive for fatigue. Negative for activity change, appetite change and chills. Fever: Maybe.   HENT: Positive for congestion,  ear pain, postnasal drip, rhinorrhea, sinus pressure, sinus pain and sore throat. Negative for ear discharge, facial swelling, hearing loss, sneezing and trouble swallowing.    Eyes: Negative for discharge, redness and itching.   Respiratory: Positive for cough. Negative for shortness of breath and wheezing.    Gastrointestinal: Negative for nausea and vomiting.   Musculoskeletal: Negative for myalgias.   Skin: Negative for color change and rash.   Neurological: Positive for headaches. Negative for dizziness and light-headedness.   Hematological: Negative for adenopathy.   Psychiatric/Behavioral: Negative for sleep disturbance.     There were no vitals taken for this visit.  Physical Exam  Constitutional:       General: She is not in acute distress.  Neurological:      Mental Status: She is alert.   Psychiatric:         Mood and Affect: Mood and affect normal.       Assessment/Plan   Diagnoses and all orders for this visit:    1. Acute non-recurrent maxillary sinusitis (Primary)  -     amoxicillin-clavulanate (Augmentin) 875-125 MG per tablet; Take 1 tablet by mouth 2 (Two) Times a Day for 10 days.  Dispense: 20 tablet; Refill: 0    Symptoms discussed with Mirian. Treatment options reviewed. Counseled regarding supportive care measures. Encouraged her to seek further medical evaluation if symptoms worsen or do not improve within 48-72 hours.   This visit has been scheduled as a phone visit to comply with patient safety concerns in accordance with CDC recommendations. Total time of discussion was 12  minutes.      This document has been electronically signed by CHARMAINE Mary, GINA, CHRISTOPHER  October 22, 2020 15:49 EDT

## 2020-12-09 DIAGNOSIS — I10 ESSENTIAL HYPERTENSION: ICD-10-CM

## 2020-12-09 DIAGNOSIS — J30.1 CHRONIC SEASONAL ALLERGIC RHINITIS DUE TO POLLEN: ICD-10-CM

## 2020-12-09 DIAGNOSIS — E55.9 VITAMIN D DEFICIENCY: ICD-10-CM

## 2020-12-09 RX ORDER — LEVOCETIRIZINE DIHYDROCHLORIDE 5 MG/1
TABLET, FILM COATED ORAL
Qty: 30 TABLET | Refills: 5 | Status: SHIPPED | OUTPATIENT
Start: 2020-12-09 | End: 2021-02-06 | Stop reason: SDUPTHER

## 2020-12-09 RX ORDER — DICLOFENAC SODIUM 75 MG/1
TABLET, DELAYED RELEASE ORAL
Qty: 60 TABLET | Refills: 5 | Status: SHIPPED | OUTPATIENT
Start: 2020-12-09 | End: 2022-04-21

## 2020-12-10 RX ORDER — HYDROCHLOROTHIAZIDE 25 MG/1
25 TABLET ORAL DAILY
Qty: 30 TABLET | Refills: 5 | Status: SHIPPED | OUTPATIENT
Start: 2020-12-10

## 2020-12-10 RX ORDER — BUSPIRONE HYDROCHLORIDE 10 MG/1
TABLET ORAL
Qty: 90 TABLET | Refills: 5 | Status: SHIPPED | OUTPATIENT
Start: 2020-12-10 | End: 2022-04-21

## 2020-12-10 RX ORDER — POTASSIUM CHLORIDE 750 MG/1
TABLET, FILM COATED, EXTENDED RELEASE ORAL
Qty: 60 TABLET | Refills: 5 | Status: SHIPPED | OUTPATIENT
Start: 2020-12-10

## 2020-12-10 RX ORDER — LISINOPRIL 10 MG/1
10 TABLET ORAL DAILY
Qty: 30 TABLET | Refills: 5 | Status: SHIPPED | OUTPATIENT
Start: 2020-12-10

## 2020-12-10 RX ORDER — ATENOLOL 25 MG/1
TABLET ORAL
Qty: 60 TABLET | Refills: 5 | Status: SHIPPED | OUTPATIENT
Start: 2020-12-10

## 2020-12-10 RX ORDER — FLUTICASONE PROPIONATE 50 MCG
SPRAY, SUSPENSION (ML) NASAL
Qty: 16 G | Refills: 5 | Status: SHIPPED | OUTPATIENT
Start: 2020-12-10

## 2020-12-10 RX ORDER — ERGOCALCIFEROL 1.25 MG/1
CAPSULE ORAL
Qty: 4 CAPSULE | Refills: 5 | Status: SHIPPED | OUTPATIENT
Start: 2020-12-10

## 2021-02-04 DIAGNOSIS — J30.1 CHRONIC SEASONAL ALLERGIC RHINITIS DUE TO POLLEN: ICD-10-CM

## 2021-02-04 DIAGNOSIS — K57.20 DIVERTICULITIS OF LARGE INTESTINE WITH PERFORATION WITHOUT BLEEDING: ICD-10-CM

## 2021-02-06 RX ORDER — LORATADINE, PSEUDOEPHEDRINE SULFATE 5; 120 MG/1; MG/1
TABLET, FILM COATED, EXTENDED RELEASE ORAL
Qty: 60 TABLET | Refills: 5 | Status: SHIPPED | OUTPATIENT
Start: 2021-02-06

## 2021-02-06 RX ORDER — LINACLOTIDE 290 UG/1
CAPSULE, GELATIN COATED ORAL
Qty: 30 CAPSULE | Refills: 5 | Status: SHIPPED | OUTPATIENT
Start: 2021-02-06

## 2021-02-06 RX ORDER — FLUOXETINE 10 MG/1
10 CAPSULE ORAL DAILY
Qty: 30 CAPSULE | Refills: 5 | Status: SHIPPED | OUTPATIENT
Start: 2021-02-06

## 2021-02-20 RX ORDER — MEDROXYPROGESTERONE ACETATE 150 MG/ML
INJECTION, SUSPENSION INTRAMUSCULAR
Qty: 1 ML | Refills: 3 | Status: SHIPPED | OUTPATIENT
Start: 2021-02-20

## 2021-03-04 RX ORDER — ESOMEPRAZOLE MAGNESIUM 40 MG/1
40 CAPSULE, DELAYED RELEASE ORAL
Qty: 30 CAPSULE | Refills: 5 | Status: SHIPPED | OUTPATIENT
Start: 2021-03-04

## 2021-03-15 ENCOUNTER — BULK ORDERING (OUTPATIENT)
Dept: CASE MANAGEMENT | Facility: OTHER | Age: 51
End: 2021-03-15

## 2021-03-15 DIAGNOSIS — Z23 IMMUNIZATION DUE: ICD-10-CM

## 2021-03-19 ENCOUNTER — TELEPHONE (OUTPATIENT)
Dept: FAMILY MEDICINE CLINIC | Facility: CLINIC | Age: 51
End: 2021-03-19

## 2021-03-19 RX ORDER — MONTELUKAST SODIUM 10 MG/1
10 TABLET ORAL DAILY
Qty: 30 TABLET | Refills: 0 | Status: SHIPPED | OUTPATIENT
Start: 2021-03-19 | End: 2021-04-19

## 2021-03-19 NOTE — TELEPHONE ENCOUNTER
"    Caller: Mirian Fischer    Relationship: Self    Best call back number :[phone]    What medication are you requesting: SOMETHING TO DRY UP NASAL DRAINAGE     What are your current symptoms: SORE THROAT, NASAL DRAINAGE, LEFT EAR PAIN    How long have you been experiencing symptoms: \" ALL WINTER\"    Have you had these symptoms before:    [x] Yes  [] No    Have you been treated for these symptoms before:   [x] Yes  [] No    If a prescription is needed, what is your preferred pharmacy and phone number:  [facility]   Additional notes:  PLEASE CALL PATIENT TO LET HER KNOW IF SOMETHING CAN BE CALLED IN         "

## 2021-04-19 RX ORDER — MONTELUKAST SODIUM 10 MG/1
10 TABLET ORAL DAILY
Qty: 30 TABLET | Refills: 0 | Status: SHIPPED | OUTPATIENT
Start: 2021-04-19 | End: 2021-06-24

## 2021-06-24 RX ORDER — MONTELUKAST SODIUM 10 MG/1
TABLET ORAL
Qty: 30 TABLET | Refills: 0 | Status: SHIPPED | OUTPATIENT
Start: 2021-06-24 | End: 2021-08-25 | Stop reason: SDUPTHER

## 2021-08-25 RX ORDER — MONTELUKAST SODIUM 10 MG/1
TABLET ORAL
Qty: 30 TABLET | Refills: 0 | Status: SHIPPED | OUTPATIENT
Start: 2021-08-25 | End: 2021-10-19

## 2021-10-19 RX ORDER — MONTELUKAST SODIUM 10 MG/1
TABLET ORAL
Qty: 30 TABLET | Refills: 0 | Status: SHIPPED | OUTPATIENT
Start: 2021-10-19 | End: 2021-12-14

## 2021-12-14 ENCOUNTER — TELEPHONE (OUTPATIENT)
Dept: FAMILY MEDICINE CLINIC | Facility: CLINIC | Age: 51
End: 2021-12-14

## 2021-12-14 RX ORDER — MONTELUKAST SODIUM 10 MG/1
TABLET ORAL
Qty: 30 TABLET | Refills: 0 | Status: SHIPPED | OUTPATIENT
Start: 2021-12-14 | End: 2022-02-09

## 2022-02-09 RX ORDER — MONTELUKAST SODIUM 10 MG/1
TABLET ORAL
Qty: 30 TABLET | Refills: 0 | Status: SHIPPED | OUTPATIENT
Start: 2022-02-09

## 2022-02-24 ENCOUNTER — TRANSCRIBE ORDERS (OUTPATIENT)
Dept: ADMINISTRATIVE | Facility: HOSPITAL | Age: 52
End: 2022-02-24

## 2022-02-24 DIAGNOSIS — M51.36 DEGENERATION OF LUMBAR INTERVERTEBRAL DISC: Primary | ICD-10-CM

## 2022-03-03 ENCOUNTER — APPOINTMENT (OUTPATIENT)
Dept: MRI IMAGING | Facility: HOSPITAL | Age: 52
End: 2022-03-03

## 2022-03-29 ENCOUNTER — HOSPITAL ENCOUNTER (OUTPATIENT)
Dept: MRI IMAGING | Facility: HOSPITAL | Age: 52
Discharge: HOME | End: 2022-03-29
Admitting: ANESTHESIOLOGY

## 2022-03-29 DIAGNOSIS — M51.36 DEGENERATION OF LUMBAR INTERVERTEBRAL DISC: ICD-10-CM

## 2022-03-29 PROCEDURE — 72148 MRI LUMBAR SPINE W/O DYE: CPT

## 2022-03-29 PROCEDURE — 72148 MRI LUMBAR SPINE W/O DYE: CPT | Performed by: RADIOLOGY

## 2022-04-04 ENCOUNTER — TELEPHONE (OUTPATIENT)
Dept: NEUROSURGERY | Facility: CLINIC | Age: 52
End: 2022-04-04

## 2022-04-04 NOTE — TELEPHONE ENCOUNTER
Provider:  Jeffry  Caller: patient  Time of call:  11:55   Phone #:  [phone]  Surgery:  na  Surgery Date:  na  Last visit:  2-   Next visit: KEEGAN COLLADO:         Reason for call: Patient called and said that since she last saw Dr. Teran her pain has gotten worse, she has a new MRI that was done at  [facility], she wants to know if she can get an appointment to see someone.  She has been seeing PM but does not see him again till the 4-19-22 Please Advise. Thank you.    When did it start: for years    Where is it located: mid -low back    How long has this been going on/is this new or the same as before surgery: years    Characteristics of symptom/severity: numbness, tingling,sharp pain down to both legs    Timing- Is it constant or intermittent:  Constant     What makes it worse:no    What makes it better: pain medicine and OTC, and a massager     What therapies/medications have you tried:  Percocet 10- 4 times a day,  Gabapentin 600 MG 3 times a day, Robaxin 750 MG  4 times a day,Meloxicam 400 Mg  3 times a day.Heat and Ice.

## 2022-04-06 RX ORDER — MONTELUKAST SODIUM 10 MG/1
TABLET ORAL
Qty: 30 TABLET | Refills: 0 | OUTPATIENT
Start: 2022-04-06

## 2022-04-14 ENCOUNTER — TELEPHONE (OUTPATIENT)
Dept: NEUROSURGERY | Facility: CLINIC | Age: 52
End: 2022-04-14

## 2022-04-14 NOTE — TELEPHONE ENCOUNTER
PATIENT CALLED TO REQUEST RECORDS FROM HER TIME WITH DR GUERIN BE SENT TO DR SHAGUFTA DIAMOND. SHE WOULD LIKE TO KEEP HER APPT WITH TL KING NEXT WEEK, BUT IS NERVOUS ABOUT ESTABLISHING CARE WITH A NEW SURGEON. I PROVIDED NADJA # AND TRANSFERRED THE CALL.

## 2022-04-21 ENCOUNTER — OFFICE VISIT (OUTPATIENT)
Dept: NEUROSURGERY | Facility: CLINIC | Age: 52
End: 2022-04-21

## 2022-04-21 VITALS
RESPIRATION RATE: 18 BRPM | WEIGHT: 168 LBS | HEART RATE: 85 BPM | DIASTOLIC BLOOD PRESSURE: 104 MMHG | HEIGHT: 65 IN | BODY MASS INDEX: 27.99 KG/M2 | SYSTOLIC BLOOD PRESSURE: 145 MMHG

## 2022-04-21 DIAGNOSIS — M43.10 ACQUIRED SPONDYLOLISTHESIS: Primary | ICD-10-CM

## 2022-04-21 DIAGNOSIS — M51.26 HNP (HERNIATED NUCLEUS PULPOSUS), LUMBAR: ICD-10-CM

## 2022-04-21 DIAGNOSIS — G89.4 CHRONIC PAIN SYNDROME: ICD-10-CM

## 2022-04-21 DIAGNOSIS — M51.36 BULGE OF LUMBAR DISC WITHOUT MYELOPATHY: ICD-10-CM

## 2022-04-21 DIAGNOSIS — F17.210 CIGARETTE NICOTINE DEPENDENCE WITHOUT COMPLICATION: ICD-10-CM

## 2022-04-21 DIAGNOSIS — M48.062 SPINAL STENOSIS, LUMBAR REGION, WITH NEUROGENIC CLAUDICATION: ICD-10-CM

## 2022-04-21 DIAGNOSIS — M51.36 DDD (DEGENERATIVE DISC DISEASE), LUMBAR: ICD-10-CM

## 2022-04-21 DIAGNOSIS — M15.9 PRIMARY OSTEOARTHRITIS INVOLVING MULTIPLE JOINTS: ICD-10-CM

## 2022-04-21 PROCEDURE — 99204 OFFICE O/P NEW MOD 45 MIN: CPT | Performed by: PHYSICIAN ASSISTANT

## 2022-04-21 NOTE — PROGRESS NOTES
Patient: Mirian Fischer  : 1970  Chart #: [id number]    Date of Service: 2022    Chief Complaint   Patient presents with   • Back Pain   Bilateral leg pain    HPI  Is a 52-year-old female with known degenerative disc disease in the lumbar spine who presents with worsening back pain.  She has pain that radiates up to the lower thoracic spine, pain across the lower back and pain that will radiate into both legs.  Her back pain is severe and present all the time, she leans on the cabinet because of back pain.  Bending walking increases her bilateral leg pain.  She is here with a new MRI for review.    Chronic Illnesses:  Osteoarthritis  Chronic low back pain  Chronic pain syndrome  2 pack-a-day smoker  Past Medical History:   Diagnosis Date   • Allergic rhinitis    • Anemia    • Anxiety    • Anxiety disorder    • Cellulitis and abscess     labia and groin   • Conjunctival folliculosis    • Constipation    • Controlled type 2 DM with peripheral circulatory disorder (HCC)    • Degenerative disc disease at L5-S1 level    • Depression    • Dermatitis due to drug    • Diverticulitis    • Elevated cholesterol    • Fistula    • Flushing    • GERD (gastroesophageal reflux disease)    • Herniated cervical disc    • Hypertension    • Hypertension    • Impacted cerumen    • Lumbago    • Lumbosacral disc disease    • Malaise and fatigue    • Nausea    • Osteoarthritis, multiple sites    • Peripheral neuropathy    • Pulmonary arterial hypertension (HCC)    • Sleep apnea    • Sleep apnea    • Swelling, limb    • Thoracic disc disorder    • Tobacco use    • Vitamin D deficiency          Past Surgical History:   Procedure Laterality Date   •  SECTION     • CHOLECYSTECTOMY     • COLON RESECTION     • COLONOSCOPY N/A 2016    Procedure: COLONOSCOPY;  Surgeon: Bakari Pruitt MD;  Location: [facility];  Service:    • ENDOSCOPY     • FINGER REIMPLANTATION     • FRACTURE SURGERY      right hand   •  INCISION AND DRAINAGE ABSCESS Right 03/28/2019    Procedure: INCISION AND DRAINAGE ABSCESS;  Surgeon: Bakari Pruitt MD;  Location: [facility] OR;  Service: General   • LAPAROSCOPIC GASTRIC BANDING     • TRIGGER POINT INJECTION     • TYMPANOPLASTY     • WISDOM TOOTH EXTRACTION         No Known Allergies      Current Outpatient Medications:   •  Alavert Allergy/Sinus 5-120 MG per 12 hr tablet, TAKE 1 TABLET BY MOUTH TWO TIMES A DAY, Disp: 60 tablet, Rfl: 5  •  atenolol (TENORMIN) 25 MG tablet, TAKE ONE TABLET BY MOUTH EVERY 12 HOURS, Disp: 60 tablet, Rfl: 5  •  DULoxetine (CYMBALTA) 60 MG capsule, Take 1 capsule by mouth Daily., Disp: 30 capsule, Rfl: 3  •  esomeprazole (nexIUM) 40 MG capsule, TAKE 1 CAPSULE BY MOUTH EVERY MORNING BEFORE BREAKFAST., Disp: 30 capsule, Rfl: 5  •  etodolac (LODINE) 400 MG tablet, Take 400 mg by mouth 3 (Three) Times a Day., Disp: , Rfl:   •  FLUoxetine (PROzac) 10 MG capsule, TAKE 1 CAPSULE BY MOUTH DAILY., Disp: 30 capsule, Rfl: 5  •  fluticasone (FLONASE) 50 MCG/ACT nasal spray, USE 1 SPRAY IN EACH NOSTRIL ONCE DAILY, Disp: 16 g, Rfl: 5  •  gabapentin (NEURONTIN) 600 MG tablet, Take 1 tablet by mouth 3 (Three) Times a Day., Disp: 45 tablet, Rfl: 0  •  hydroCHLOROthiazide (HYDRODIURIL) 25 MG tablet, TAKE 1 TABLET BY MOUTH DAILY., Disp: 30 tablet, Rfl: 5  •  lansoprazole (Prevacid) 30 MG capsule, Take 1 capsule by mouth 2 (Two) Times a Day., Disp: 30 capsule, Rfl: 5  •  Linzess 290 MCG capsule capsule, TAKE 1 CAPSULE BY MOUTH EVERY MORNING BEFORE BREAKFAST., Disp: 30 capsule, Rfl: 5  •  lisinopril (PRINIVIL,ZESTRIL) 10 MG tablet, TAKE 1 TABLET BY MOUTH DAILY., Disp: 30 tablet, Rfl: 5  •  medroxyPROGESTERone Acetate 150 MG/ML suspension prefilled syringe, INJECT 1ML INTO THE SHOULDER,THIGH OR BUTTOCKS EVERY 3 MONTHS, Disp: 1 mL, Rfl: 3  •  methocarbamol (ROBAXIN) 750 MG tablet, TAKE ONE TABLET BY MOUTH TWO TIMES A DAY, Disp: 60 tablet, Rfl: 5  •  montelukast (SINGULAIR) 10 MG tablet,  TAKE ONE TABLET BY MOUTH ONCE DAILY, Disp: 30 tablet, Rfl: 0  •  Multiple Vitamins-Minerals (MULTI-VITAMIN GUMMIES) chewable tablet, Chew 2 each Daily., Disp: 100 tablet, Rfl: 5  •  oxyCODONE-acetaminophen (PERCOCET)  MG per tablet, , Disp: , Rfl:   •  potassium chloride 10 MEQ CR tablet, TAKE ONE TABLET BY MOUTH TWO TIMES A DAY, Disp: 60 tablet, Rfl: 5  •  simvastatin (ZOCOR) 20 MG tablet, Take 1 tablet by mouth Every Night., Disp: 30 tablet, Rfl: 5  •  vitamin D (ERGOCALCIFEROL) 1.25 MG (00783 UT) capsule capsule, TAKE ONE CAPSULE BY MOUTH ONCE WEEKLY, Disp: 4 capsule, Rfl: 5  •  busPIRone (BUSPAR) 10 MG tablet, TAKE 1 TABLET BY MOUTH THREE TIMES A DAY, Disp: 90 tablet, Rfl: 5  •  busPIRone (BUSPAR) 5 MG tablet, TAKE ONE TABLET BY MOUTH THREE TIMES A DAY AS NEEDED FOR PANIC ATTACK/ANXIETY, Disp: 90 tablet, Rfl: 5  •  diclofenac (VOLTAREN) 75 MG EC tablet, TAKE ONE TABLET BY MOUTH TWO TIMES A DAY, Disp: 60 tablet, Rfl: 5    Current Facility-Administered Medications:   •  cyanocobalamin injection 1,000 mcg, 1,000 mcg, Intramuscular, Q28 Days, Jaleesa Contreras APRN, 1,000 mcg at 10/15/19 0924    Social History     Socioeconomic History   • Marital status: Single   Tobacco Use   • Smoking status: Current Every Day Smoker     Packs/day: 2.00     Years: 35.00     Pack years: 70.00     Types: Cigarettes   • Smokeless tobacco: Never Used   Substance and Sexual Activity   • Alcohol use: No   • Drug use: No   • Sexual activity: Not Currently     Partners: Male     Birth control/protection: Injection       Family History   Problem Relation Age of Onset   • Arthritis Mother    • Asthma Mother    • Cancer Mother    • COPD Mother    • Depression Mother    • Diabetes Mother    • Heart disease Mother    • Hyperlipidemia Mother    • Hypertension Mother    • Arthritis Father    • Asthma Father    • COPD Father    • Diabetes Father    • Hyperlipidemia Father    • Hypertension Father    • Arthritis Sister    •  Diabetes Sister    • Hyperlipidemia Sister    • Hypertension Sister    • Arthritis Brother    • Diabetes Brother    • Hyperlipidemia Brother    • Hypertension Brother    • Diabetes Maternal Grandmother    • Hyperlipidemia Maternal Grandmother    • Hypertension Maternal Grandmother    • Diabetes Maternal Grandfather    • Hyperlipidemia Maternal Grandfather    • Hypertension Maternal Grandfather    • Diabetes Paternal Grandmother    • Hyperlipidemia Paternal Grandmother    • Hypertension Paternal Grandmother    • Diabetes Paternal Grandfather    • Hyperlipidemia Paternal Grandfather    • Hypertension Paternal Grandfather        Patient's There is no height or weight on file to calculate BMI. indicating that she is overweight (BMI 25-29.9). Patient's (Body mass index is 27.96 kg/m².) indicates that they are overweight with health conditions that include osteoarthritis . Weight is unchanged. BMI is is above average; BMI management plan is completed.      Social History    Tobacco Use      Smoking status: Current Every Day Smoker        Packs/day: 2.00        Years: 35.00        Pack years: 70        Types: Cigarettes      Smokeless tobacco: Never Used       Physical examination:  not currently breastfeeding.  HEENT- normocephalic, atraumatic, sclera clear  Lungs-normal expansion, no wheezing  Heart-regular rate and rhythm  Extremities-positive pulses, no edema     Neurologic Exam  WDWN WF  A/A/C, speech clear, attention normal, conversant, answers questions appropriately, good historian.  Cranial nerves II through XII are intact.  Motor examination does not reveal weakness in the , upper or lower extremities.   Sensation is intact.  Gait is normal, balance is normal.   No tremors are noted.  Reflexes are intact.   Teixeira is negative. Clonus is negative.   Palpation of the thoracic and lumbar paraspinal musculature is tender.  She has decreased range of motion of the lumbar spine with flexion and  extension.    Radiographic Imaging:  For my review today is an MRI from March 2022 which reveals multilevel degenerative disc disease from L1-S1.  There is facet arthropathy at T12-L1 creating mild canal impingement With mild broad-based disc bulging without nerve root compression.  At L2-3 there is broad-based disc bulging, thickened ligamentum flavum encroaching into the canal as well as mild to moderate bilateral foraminal narrowing.  At L3-4 there is an offset with broad-based disc bulging, facet arthropathy worse on the left than the right creating moderate bilateral foraminal narrowing and at L5-S1 there is loss of disc space height, broad-based disc bulging along with a central disc herniation and facet arthropathy.    Medical Decision Making  Assessment and Plan:  Diagnoses and all orders for this visit:    1. Acquired spondylolisthesis (Primary)  -     XR Spine Lumbar AP & Lateral With Flex & Ext; Future  -     EMG & Nerve Conduction Test; Future    2. Spinal stenosis, lumbar region, with neurogenic claudication  -     EMG & Nerve Conduction Test; Future    3. DDD (degenerative disc disease), lumbar  -     EMG & Nerve Conduction Test; Future    4. Bulge of lumbar disc without myelopathy  -     EMG & Nerve Conduction Test; Future    5. Primary osteoarthritis involving multiple joints  -     EMG & Nerve Conduction Test; Future    6. Chronic pain syndrome  -     EMG & Nerve Conduction Test; Future    7. HNP (herniated nucleus pulposus), lumbar  -     EMG & Nerve Conduction Test; Future    8. Cigarette nicotine dependence without complication    The patient has a spondylolisthesis at L4-5 therefore she needs flexion-extension x-rays of the lumbar spine.  She has some radicular focal pain but generalized pain into both legs consistent with neurogenic claudication therefore she needs an EMG and nerve conduction study of the lower extremities.  She is a 2 pack-a-day smoker which is problematic for surgical  intervention therefore I am sending her to her PCP for a smoking cessation plan.  I have discussed this at length with the patient that she will need to be nicotine free prior to consideration of surgery.  With her worsening pain she is in agreement with this plan.  She will follow-up with Dr. Chand as already scheduled, I would inquire whether a epidural steroid injection would be an option at this time due to her increased pain.  We will have a telemedicine visit in 2 months to see how she is doing with her smoking cessation.  In the interim we will get x-rays and EMG studies.       Marisabel Chu, PAC    Patient Care Team:  Jaleesa Contreras APRN as PCP - General  Jaleesa Contreras APRN as PCP - Family Medicine

## 2024-06-18 ENCOUNTER — TELEPHONE (OUTPATIENT)
Dept: FAMILY MEDICINE CLINIC | Facility: CLINIC | Age: 54
End: 2024-06-18
Payer: MEDICARE

## 2024-06-18 ENCOUNTER — READMISSION MANAGEMENT (OUTPATIENT)
Dept: CALL CENTER | Facility: HOSPITAL | Age: 54
End: 2024-06-18
Payer: MEDICARE

## 2024-06-18 NOTE — OUTREACH NOTE
Prep Survey      Flowsheet Row Responses   [facility] facility patient discharged from? Non-[facility]   Is LACE score < 7 ? Non-BH Discharge   Eligibility [facility]   Date of Admission 06/14/24   Date of Discharge 06/17/24   Discharge Disposition Home or Self Care   Discharge diagnosis Sepsis, intra abdominal abcess   Does the patient have one of the following disease processes/diagnoses(primary or secondary)? Sepsis   Prep survey completed? Yes            Marilou GARIBAY - Registered Nurse

## 2024-06-19 ENCOUNTER — TRANSITIONAL CARE MANAGEMENT TELEPHONE ENCOUNTER (OUTPATIENT)
Dept: CALL CENTER | Facility: HOSPITAL | Age: 54
End: 2024-06-19
Payer: MEDICARE

## 2024-06-19 NOTE — OUTREACH NOTE
Call Center TCM Note      Flowsheet Row Responses   [facility]abapt[facility] facility patient discharged from? Non-BH   Does the patient have one of the following disease processes/diagnoses(primary or secondary)? Sepsis   TCM attempt successful? No   Unsuccessful attempts Attempt 1   Revoked Reason Other  [No longer sees a Anabaptism PCP. New pcp updated in Epic.]            Toya Rivera LPN    6/19/2024, 14:29 EDT

## 2025-02-06 ENCOUNTER — TRANSCRIBE ORDERS (OUTPATIENT)
Dept: ADMINISTRATIVE | Facility: HOSPITAL | Age: 55
End: 2025-02-06
Payer: MEDICARE

## 2025-02-06 DIAGNOSIS — M54.16 LUMBAR RADICULOPATHY: Primary | ICD-10-CM

## (undated) DEVICE — DRAINBAG,ANTI-REFLUX TOWER,L/F,2000ML,LL: Brand: MEDLINE

## (undated) DEVICE — CATH FOL BARDEX IC 2WY 22F 5CC

## (undated) DEVICE — 2, DISPOSABLE SUCTION/IRRIGATOR WITH DISPOSABLE TIP: Brand: STRYKEFLOW

## (undated) DEVICE — BANDAGE,GAUZE,BULKEE II,4.5"X4.1YD,STRL: Brand: MEDLINE

## (undated) DEVICE — ENDOPATH XCEL UNIVERSAL TROCAR STABLILITY SLEEVES: Brand: ENDOPATH XCEL

## (undated) DEVICE — SUT VIC 0/0 UR6 27IN DYED J603H

## (undated) DEVICE — DRAPE,LAPAROTOMY,PED,STERILE: Brand: MEDLINE

## (undated) DEVICE — [HIGH FLOW INSUFFLATOR,  DO NOT USE IF PACKAGE IS DAMAGED,  KEEP DRY,  KEEP AWAY FROM SUNLIGHT,  PROTECT FROM HEAT AND RADIOACTIVE SOURCES.]: Brand: PNEUMOSURE

## (undated) DEVICE — ENCORE® LATEX MICRO SIZE 8, STERILE LATEX POWDER-FREE SURGICAL GLOVE: Brand: ENCORE

## (undated) DEVICE — COR CYSTO: Brand: MEDLINE INDUSTRIES, INC.

## (undated) DEVICE — SUT PROLN 3/0 8832H

## (undated) DEVICE — ANTI-FOG SOLUTION WITH FOAM PAD: Brand: DEVON

## (undated) DEVICE — SUT MNCRYL 4/0 PS2 18 IN

## (undated) DEVICE — ENDOPATH XCEL BLADELESS TROCARS WITH STABILITY SLEEVES: Brand: ENDOPATH XCEL

## (undated) DEVICE — DRN PENRS RO SILCNE 1/4X12IN LF STRL

## (undated) DEVICE — PK BASIC 70

## (undated) DEVICE — ENCORE® LATEX MICRO SIZE 7.5, STERILE LATEX POWDER-FREE SURGICAL GLOVE: Brand: ENCORE

## (undated) DEVICE — PACK,LITHOTOMY,PK III,SIRUS: Brand: MEDLINE

## (undated) DEVICE — HOLDER: Brand: DEROYAL

## (undated) DEVICE — INTENDED FOR TISSUE SEPARATION, AND OTHER PROCEDURES THAT REQUIRE A SHARP SURGICAL BLADE TO PUNCTURE OR CUT.: Brand: BARD-PARKER ® CARBON RIB-BACK BLADES

## (undated) DEVICE — SUT SILK 3/0 SH CR8 18IN C013D

## (undated) DEVICE — SPNG GZ WOVN 4X4IN 12PLY 10/BX STRL

## (undated) DEVICE — CONMED GOLDLINE ELECTROSURGICAL HANDPIECE, HAND CONTROLLED WITH ULTRACLEAN BLADE ELECTRODE, BUTTON SWITCH, SAFETY HOLSTER AND 10' (3 M) CABLE: Brand: CONMED GOLDLINE

## (undated) DEVICE — DRSNG PAD ABD 8X10IN STRL

## (undated) DEVICE — 40595 XL TRENDELENBURG POSITIONING KIT: Brand: 40595 XL TRENDELENBURG POSITIONING KIT

## (undated) DEVICE — SUT ETHLN 3/0 FS1 663G

## (undated) DEVICE — GW SUP AMPLATZ ULTR/STFF/STR PTFE SS 0.35IN 145CM

## (undated) DEVICE — TRY SKINPREP PVP SCRB W PAINT

## (undated) DEVICE — IRRIGATOR TOOMEY 70CC

## (undated) DEVICE — 3M™ IOBAN™ 2 ANTIMICROBIAL INCISE DRAPE 6650EZ: Brand: IOBAN™ 2

## (undated) DEVICE — PAD GRND REM POLYHESIVE A/ DISP

## (undated) DEVICE — BNDG ELAS ELITE V/CLOSE 4IN 5YD LF STRL

## (undated) DEVICE — ENDOCUT SCISSOR TIP, DISPOSABLE: Brand: RENEW

## (undated) DEVICE — PACK,UNIVERSAL,NO GOWNS: Brand: MEDLINE

## (undated) DEVICE — AMD ANTIMICROBIAL NON-ADHERENT ISLAND DRESSING,0.2% POLYHEXAMETHYLENE BIGUANIDE HCI (PHMB): Brand: TELFA

## (undated) DEVICE — PAD,ABDOMINAL,8"X10",ST,LF: Brand: MEDLINE

## (undated) DEVICE — GLW STD STR 3CM .035X150CM